# Patient Record
Sex: FEMALE | Race: WHITE | Employment: UNEMPLOYED | URBAN - METROPOLITAN AREA
[De-identification: names, ages, dates, MRNs, and addresses within clinical notes are randomized per-mention and may not be internally consistent; named-entity substitution may affect disease eponyms.]

---

## 2019-05-13 ENCOUNTER — OFFICE VISIT (OUTPATIENT)
Dept: PRIMARY CARE CLINIC | Age: 39
End: 2019-05-13

## 2019-05-13 VITALS
TEMPERATURE: 97.8 F | SYSTOLIC BLOOD PRESSURE: 91 MMHG | BODY MASS INDEX: 19.37 KG/M2 | OXYGEN SATURATION: 98 % | HEIGHT: 67 IN | HEART RATE: 82 BPM | WEIGHT: 123.4 LBS | DIASTOLIC BLOOD PRESSURE: 61 MMHG | RESPIRATION RATE: 14 BRPM

## 2019-05-13 DIAGNOSIS — F17.200 SMOKING: Primary | ICD-10-CM

## 2019-05-13 DIAGNOSIS — Z76.89 ENCOUNTER TO ESTABLISH CARE: ICD-10-CM

## 2019-05-13 DIAGNOSIS — Z00.00 LABORATORY EXAMINATION ORDERED AS PART OF A COMPLETE PHYSICAL EXAMINATION: ICD-10-CM

## 2019-05-13 RX ORDER — LORAZEPAM 0.5 MG/1
TABLET ORAL
COMMUNITY
End: 2019-07-02 | Stop reason: ALTCHOICE

## 2019-05-13 RX ORDER — DEXTROAMPHETAMINE SACCHARATE, AMPHETAMINE ASPARTATE, DEXTROAMPHETAMINE SULFATE AND AMPHETAMINE SULFATE 5; 5; 5; 5 MG/1; MG/1; MG/1; MG/1
20 TABLET ORAL
COMMUNITY
End: 2019-07-02

## 2019-05-13 RX ORDER — DULOXETIN HYDROCHLORIDE 60 MG/1
60 CAPSULE, DELAYED RELEASE ORAL DAILY
COMMUNITY
End: 2019-07-02

## 2019-05-13 NOTE — PROGRESS NOTES
Chief Complaint   Patient presents with   [facility]     Patient sees Dr. Bianka Yepez for Psych medications. Visit Vitals  BP 91/61 (BP 1 Location: Left arm, BP Patient Position: Sitting)   Pulse 82   Temp 97.8 °F (36.6 °C) (Oral)   Resp 14   Ht 5' 7\" (1.702 m)   Wt 123 lb 6.4 oz (56 kg)   SpO2 98%   BMI 19.33 kg/m²     1. Have you been to the ER, urgent care clinic since your last visit? Hospitalized since your last visit? No    2. Have you seen or consulted any other health care providers outside of the [address] [place] [address] since your last visit? Include any pap smears or colon screening.  No
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
## 2019-07-02 ENCOUNTER — OFFICE VISIT (OUTPATIENT)
Dept: PRIMARY CARE CLINIC | Age: 39
End: 2019-07-02

## 2019-07-02 VITALS
SYSTOLIC BLOOD PRESSURE: 98 MMHG | OXYGEN SATURATION: 98 % | BODY MASS INDEX: 21.94 KG/M2 | DIASTOLIC BLOOD PRESSURE: 72 MMHG | RESPIRATION RATE: 14 BRPM | WEIGHT: 139.8 LBS | HEART RATE: 73 BPM | TEMPERATURE: 98.4 F | HEIGHT: 67 IN

## 2019-07-02 DIAGNOSIS — Z00.00 LABORATORY EXAMINATION ORDERED AS PART OF A COMPLETE PHYSICAL EXAMINATION: ICD-10-CM

## 2019-07-02 DIAGNOSIS — G25.9 ABNORMAL LEG MOVEMENT: ICD-10-CM

## 2019-07-02 DIAGNOSIS — F17.200 SMOKING: ICD-10-CM

## 2019-07-02 DIAGNOSIS — Z00.00 ROUTINE GENERAL MEDICAL EXAMINATION AT A HEALTH CARE FACILITY: Primary | ICD-10-CM

## 2019-07-02 LAB
BILIRUB UR QL STRIP: NORMAL
GLUCOSE UR-MCNC: NEGATIVE MG/DL
KETONES P FAST UR STRIP-MCNC: NEGATIVE MG/DL
PH UR STRIP: 5.5 [PH] (ref 4.6–8)
PROT UR QL STRIP: NEGATIVE
SP GR UR STRIP: 1.03 (ref 1–1.03)
UA UROBILINOGEN AMB POC: NORMAL (ref 0.2–1)
URINALYSIS CLARITY POC: CLEAR
URINALYSIS COLOR POC: YELLOW
URINE BLOOD POC: NEGATIVE
URINE LEUKOCYTES POC: NEGATIVE
URINE NITRITES POC: NEGATIVE

## 2019-07-02 NOTE — PROGRESS NOTES
This note will not be viewable in [address] Miles Moore is a  34 y.o. female presents for visit. Chief Complaint   Patient presents with    Physical     fasting       HPI     Patient presents for a fasting physical.  She is followed by the [facility] for OB/GYN. Pap done last week. Waiting on test results. Will follow-up there for baseline screening mammogram.  She self discontinued her psychiatric medications last month that included Adderall, Cymbalta and Ativan as needed. Doing well. Followed by Dr. Power Courtney at [address] Endorses leg jerking at night while sleeping that wakes her up. Started a couple of months ago. This is not associated with falling asleep. She experiences an unpleasant sensation, not like a \"charley horse\" that is alleviated with movement and getting out of bed. Did not occur last night. Review of Systems   Constitutional: Negative. HENT: Negative. Eyes: Negative. Respiratory: Negative. Cardiovascular: Negative. Gastrointestinal: Positive for constipation (intermittent) and diarrhea (intermittent). Negative for blood in stool. Genitourinary: Negative. Musculoskeletal: Positive for myalgias (leg jerks at night). Skin: Negative. Neurological: Negative. Endo/Heme/Allergies: Negative for environmental allergies.         Past Medical History:   Diagnosis Date    ADD (attention deficit disorder)     Anxiety     Depression       Past Surgical History:   Procedure Laterality Date    ENDOMETRIAL CRYOABLATION  2003    HX GYN      Endometrial Ablation    HX TUBAL LIGATION  2003        Social History     Tobacco Use    Smoking status: Current Every Day Smoker     Packs/day: 0.50     Years: 10.00     Pack years: 5.00    Smokeless tobacco: Never Used   Substance Use Topics    Alcohol use: Not Currently     Comment: none      Social History     Social History Narrative    Not on file     Family History   Problem Relation Age of Onset    Diabetes Father     Diabetes Paternal Grandfather     Cancer Paternal Grandfather         LUNG    Heart Disease Maternal Grandmother     Psychiatric Disorder Paternal Grandmother     Cancer Paternal Grandmother         LUNG      Prior to Admission medications    Medication Sig Start Date End Date Taking? Authorizing Provider   ibuprofen (ADVIL) 200 mg tablet Take  by mouth. Provider, Historical      No Known Allergies       Visit Vitals  BP 98/72 (BP 1 Location: Left arm, BP Patient Position: Sitting)   Pulse 73   Temp 98.4 °F (36.9 °C) (Oral)   Resp 14   Ht 5' 7\" (1.702 m)   Wt 139 lb 12.8 oz (63.4 kg)   SpO2 98%   BMI 21.90 kg/m²     Physical Exam   Constitutional: She is oriented to person, place, and time. Vital signs are normal. She appears well-developed and well-nourished. HENT:   Head: Normocephalic and atraumatic. Right Ear: Hearing, tympanic membrane, external ear and ear canal normal.   Left Ear: Hearing, tympanic membrane, external ear and ear canal normal.   Nose: Nose normal. No rhinorrhea. Right sinus exhibits no maxillary sinus tenderness and no frontal sinus tenderness. Left sinus exhibits no maxillary sinus tenderness and no frontal sinus tenderness. Mouth/Throat: Uvula is midline, oropharynx is clear and moist and mucous membranes are normal.   Eyes: Pupils are equal, round, and reactive to light. Conjunctivae, EOM and lids are normal. Right eye exhibits no discharge and no exudate. Left eye exhibits no discharge and no exudate. No scleral icterus. Neck: Trachea normal and normal range of motion. Neck supple. Cardiovascular: Normal rate, regular rhythm, S1 normal, S2 normal, normal heart sounds and normal pulses. No murmur heard. Pulmonary/Chest: Effort normal and breath sounds normal. No accessory muscle usage. No respiratory distress. Abdominal: Soft. Normal appearance and bowel sounds are normal. There is no tenderness.    Musculoskeletal: Normal range of motion. Lymphadenopathy:     She has no cervical adenopathy. Neurological: She is alert and oriented to person, place, and time. She has normal strength and normal reflexes. No cranial nerve deficit or sensory deficit. Skin: Skin is warm, dry and intact. No rash noted. Psychiatric: She has a normal mood and affect. Her speech is normal and behavior is normal. Judgment and thought content normal.   Nursing note and vitals reviewed. Recent Results (from the past 24 hour(s))   AMB POC URINALYSIS DIP STICK AUTO W/O MICRO    Collection Time: 07/02/19  8:21 AM   Result Value Ref Range    Color (UA POC) Yellow     Clarity (UA POC) Clear     Glucose (UA POC) Negative Negative    Bilirubin (UA POC) 1+ Negative    Ketones (UA POC) Negative Negative    Specific gravity (UA POC) 1.030 1.001 - 1.035    Blood (UA POC) Negative Negative    pH (UA POC) 5.5 4.6 - 8.0    Protein (UA POC) Negative Negative    Urobilinogen (UA POC) 0.2 mg/dL 0.2 - 1    Nitrites (UA POC) Negative Negative    Leukocyte esterase (UA POC) Negative Negative       ASSESSMENT AND PLAN:  Patient Active Problem List    Diagnosis Date Noted    Smoking 05/13/2019       ICD-10-CM ICD-9-CM   1. Routine general medical examination at a health care facility Z00.00 V70.0   2. Abnormal leg movement G25.9 781.0   3. Smoking F17.200 305.1     Orders Placed This Encounter    MAGNESIUM    AMB POC URINALYSIS DIP STICK AUTO W/O MICRO       Diagnoses and all orders for this visit:    1. Routine general medical examination at a health care facility  -     AMB POC URINALYSIS DIP STICK AUTO W/O MICRO    2. Abnormal leg movement  -     MAGNESIUM      -       Maintain a log and if symptoms progress will investigate further.     3. Smoking      lab results and schedule of future lab studies reviewed with patient  reviewed diet, exercise and weight control  very strongly urged to quit smoking to reduce cardiovascular risk  Will return for nurse visit for Tdap. Follow-up and Dispositions    · Return if symptoms worsen or fail to improve. Disclaimer:  Advised her to call back or return to office if symptoms worsen/change/persist.  Discussed expected course/resolution/complications of diagnosis in detail with patient. Medication risks/benefits/costs/interactions/alternatives discussed with patient. She was given an after visit summary which includes diagnoses, current medications, & vitals. She expressed understanding with the diagnosis and plan.

## 2019-07-02 NOTE — PROGRESS NOTES
Chief Complaint   Patient presents with    Physical     fasting     Pap up to date     Visit Vitals  BP 98/72 (BP 1 Location: Left arm, BP Patient Position: Sitting)   Pulse 73   Temp 98.4 °F (36.9 °C) (Oral)   Resp 14   Ht 5' 7\" (1.702 m)   Wt 139 lb 12.8 oz (63.4 kg)   SpO2 98%   BMI 21.90 kg/m²     1. Have you been to the ER, urgent care clinic since your last visit? Hospitalized since your last visit? No    2. Have you seen or consulted any other health care providers outside of the [address] [place] [address] since your last visit? Include any pap smears or colon screening.  No

## 2019-07-03 LAB
ALBUMIN SERPL-MCNC: 4.4 G/DL (ref 3.5–5.5)
ALBUMIN/GLOB SERPL: 1.8 {RATIO} (ref 1.2–2.2)
ALP SERPL-CCNC: 48 IU/L (ref 39–117)
ALT SERPL-CCNC: 35 IU/L (ref 0–32)
AST SERPL-CCNC: 25 IU/L (ref 0–40)
BILIRUB SERPL-MCNC: 0.3 MG/DL (ref 0–1.2)
BUN SERPL-MCNC: 9 MG/DL (ref 6–24)
BUN/CREAT SERPL: 17 (ref 9–23)
CALCIUM SERPL-MCNC: 9.3 MG/DL (ref 8.7–10.2)
CHLORIDE SERPL-SCNC: 102 MMOL/L (ref 96–106)
CHOLEST SERPL-MCNC: 246 MG/DL (ref 100–199)
CO2 SERPL-SCNC: 25 MMOL/L (ref 20–29)
CREAT SERPL-MCNC: 0.54 MG/DL (ref 0.57–1)
ERYTHROCYTE [DISTWIDTH] IN BLOOD BY AUTOMATED COUNT: 13.1 % (ref 12.3–15.4)
GLOBULIN SER CALC-MCNC: 2.5 G/DL (ref 1.5–4.5)
GLUCOSE SERPL-MCNC: 86 MG/DL (ref 65–99)
HCT VFR BLD AUTO: 36.5 % (ref 34–46.6)
HDLC SERPL-MCNC: 68 MG/DL
HGB BLD-MCNC: 11.9 G/DL (ref 11.1–15.9)
LDLC SERPL CALC-MCNC: 141 MG/DL (ref 0–99)
MAGNESIUM SERPL-MCNC: 2.3 MG/DL (ref 1.6–2.3)
MCH RBC QN AUTO: 29.7 PG (ref 26.6–33)
MCHC RBC AUTO-ENTMCNC: 32.6 G/DL (ref 31.5–35.7)
MCV RBC AUTO: 91 FL (ref 79–97)
PLATELET # BLD AUTO: 354 X10E3/UL (ref 150–450)
POTASSIUM SERPL-SCNC: 4.8 MMOL/L (ref 3.5–5.2)
PROT SERPL-MCNC: 6.9 G/DL (ref 6–8.5)
RBC # BLD AUTO: 4.01 X10E6/UL (ref 3.77–5.28)
SODIUM SERPL-SCNC: 140 MMOL/L (ref 134–144)
TRIGL SERPL-MCNC: 187 MG/DL (ref 0–149)
TSH SERPL DL<=0.005 MIU/L-ACNC: 1.97 UIU/ML (ref 0.45–4.5)
VLDLC SERPL CALC-MCNC: 37 MG/DL (ref 5–40)
WBC # BLD AUTO: 5 X10E3/UL (ref 3.4–10.8)

## 2019-07-03 NOTE — PROGRESS NOTES
Please contact patient regarding abnormal test results. Labs look good with exception of elevated lipid panel. Recommend lifestyle interventions, continuing to stay active and follow a heart healthy diet. Stop smoking.   Thanks

## 2020-08-27 ENCOUNTER — OFFICE VISIT (OUTPATIENT)
Dept: PRIMARY CARE CLINIC | Age: 40
End: 2020-08-27
Payer: COMMERCIAL

## 2020-08-27 VITALS
HEIGHT: 67 IN | SYSTOLIC BLOOD PRESSURE: 113 MMHG | DIASTOLIC BLOOD PRESSURE: 80 MMHG | HEART RATE: 96 BPM | RESPIRATION RATE: 16 BRPM | TEMPERATURE: 98.5 F | OXYGEN SATURATION: 100 % | BODY MASS INDEX: 20.84 KG/M2 | WEIGHT: 132.8 LBS

## 2020-08-27 DIAGNOSIS — E78.2 MIXED HYPERLIPIDEMIA: ICD-10-CM

## 2020-08-27 DIAGNOSIS — F41.9 ANXIETY: ICD-10-CM

## 2020-08-27 DIAGNOSIS — B36.0 TINEA VERSICOLOR: ICD-10-CM

## 2020-08-27 DIAGNOSIS — Z00.00 LABORATORY TESTS ORDERED AS PART OF A COMPLETE PHYSICAL EXAM (CPE): Primary | ICD-10-CM

## 2020-08-27 LAB
ALBUMIN SERPL-MCNC: 4.4 G/DL (ref 3.5–5)
ALBUMIN/GLOB SERPL: 1.3 {RATIO} (ref 1.1–2.2)
ALP SERPL-CCNC: 55 U/L (ref 45–117)
ALT SERPL-CCNC: 41 U/L (ref 12–78)
ANION GAP SERPL CALC-SCNC: 7 MMOL/L (ref 5–15)
AST SERPL-CCNC: 21 U/L (ref 15–37)
BILIRUB SERPL-MCNC: 0.8 MG/DL (ref 0.2–1)
BILIRUB UR QL STRIP: NORMAL
BUN SERPL-MCNC: 7 MG/DL (ref 6–20)
BUN/CREAT SERPL: 10 (ref 12–20)
CALCIUM SERPL-MCNC: 9.4 MG/DL (ref 8.5–10.1)
CHLORIDE SERPL-SCNC: 107 MMOL/L (ref 97–108)
CHOLEST SERPL-MCNC: 231 MG/DL
CO2 SERPL-SCNC: 24 MMOL/L (ref 21–32)
CREAT SERPL-MCNC: 0.7 MG/DL (ref 0.55–1.02)
ERYTHROCYTE [DISTWIDTH] IN BLOOD BY AUTOMATED COUNT: 12.8 % (ref 11.5–14.5)
GLOBULIN SER CALC-MCNC: 3.4 G/DL (ref 2–4)
GLUCOSE SERPL-MCNC: 83 MG/DL (ref 65–100)
GLUCOSE UR-MCNC: NEGATIVE MG/DL
HCT VFR BLD AUTO: 36.8 % (ref 35–47)
HDLC SERPL-MCNC: 73 MG/DL
HDLC SERPL: 3.2 {RATIO} (ref 0–5)
HGB BLD-MCNC: 12.3 G/DL (ref 11.5–16)
KETONES P FAST UR STRIP-MCNC: NORMAL MG/DL
LDLC SERPL CALC-MCNC: 148 MG/DL (ref 0–100)
LIPID PROFILE,FLP: ABNORMAL
MCH RBC QN AUTO: 30.1 PG (ref 26–34)
MCHC RBC AUTO-ENTMCNC: 33.4 G/DL (ref 30–36.5)
MCV RBC AUTO: 90 FL (ref 80–99)
NRBC # BLD: 0 K/UL (ref 0–0.01)
NRBC BLD-RTO: 0 PER 100 WBC
PH UR STRIP: 5.5 [PH] (ref 4.6–8)
PLATELET # BLD AUTO: 375 K/UL (ref 150–400)
PMV BLD AUTO: 9.7 FL (ref 8.9–12.9)
POTASSIUM SERPL-SCNC: 4.5 MMOL/L (ref 3.5–5.1)
PROT SERPL-MCNC: 7.8 G/DL (ref 6.4–8.2)
PROT UR QL STRIP: NORMAL
RBC # BLD AUTO: 4.09 M/UL (ref 3.8–5.2)
SODIUM SERPL-SCNC: 138 MMOL/L (ref 136–145)
SP GR UR STRIP: 1.03 (ref 1–1.03)
TRIGL SERPL-MCNC: 50 MG/DL (ref ?–150)
TSH SERPL DL<=0.05 MIU/L-ACNC: 1.53 UIU/ML (ref 0.36–3.74)
UA UROBILINOGEN AMB POC: NORMAL (ref 0.2–1)
URINALYSIS CLARITY POC: NORMAL
URINALYSIS COLOR POC: YELLOW
URINE BLOOD POC: NEGATIVE
URINE LEUKOCYTES POC: NEGATIVE
URINE NITRITES POC: NEGATIVE
VLDLC SERPL CALC-MCNC: 10 MG/DL
WBC # BLD AUTO: 6.2 K/UL (ref 3.6–11)

## 2020-08-27 PROCEDURE — 81003 URINALYSIS AUTO W/O SCOPE: CPT | Performed by: NURSE PRACTITIONER

## 2020-08-27 PROCEDURE — 99396 PREV VISIT EST AGE 40-64: CPT | Performed by: NURSE PRACTITIONER

## 2020-08-27 RX ORDER — DEXTROAMPHETAMINE SACCHARATE, AMPHETAMINE ASPARTATE, DEXTROAMPHETAMINE SULFATE AND AMPHETAMINE SULFATE 5; 5; 5; 5 MG/1; MG/1; MG/1; MG/1
20 TABLET ORAL 2 TIMES DAILY
COMMUNITY
End: 2022-02-23

## 2020-08-27 RX ORDER — LAMOTRIGINE 100 MG/1
TABLET ORAL DAILY
COMMUNITY
End: 2020-12-22 | Stop reason: DRUGHIGH

## 2020-08-27 RX ORDER — BISMUTH SUBSALICYLATE 262 MG
1 TABLET,CHEWABLE ORAL DAILY
COMMUNITY

## 2020-08-27 RX ORDER — FLUCONAZOLE 150 MG/1
300 TABLET ORAL
Qty: 4 TAB | Refills: 0 | Status: SHIPPED | OUTPATIENT
Start: 2020-08-27 | End: 2020-09-10

## 2020-08-27 NOTE — PROGRESS NOTES
Chief Complaint   Patient presents with    Complete Physical     Patient presents today for her annual physical and labs

## 2020-08-27 NOTE — PATIENT INSTRUCTIONS
Tinea Versicolor: Care Instructions  Your Care Instructions  Tinea versicolor is a skin infection caused by a yeast (fungus). It causes many small spots, usually on the chest and back. The spotted skin can be flaky or scaly. The spots do not tan in the sun, so they are lighter than the skin around them. Some spots may be darker than the skin around them. The yeast that causes tinea versicolor normally lives on your skin. But it becomes a problem only when warmth and humidity allow the yeast to grow rapidly and increase in number. Some people are more likely to get tinea versicolor. It does not spread from person to person. Tinea versicolor usually gets better as you age. You can treat tinea versicolor with cream or ointment that kills the yeast. You may need pills to kill the fungus if the spots cover a lot of your body. Although treatment kills the yeast quickly, your skin may not return to normal for months after treatment. You can get this condition again after treatment. Follow-up care is a key part of your treatment and safety. Be sure to make and go to all appointments, and call your doctor if you are having problems. It's also a good idea to know your test results and keep a list of the medicines you take. How can you care for yourself at home? · Follow the directions for use of creams, shampoos, or solutions. You will probably need to use them for 1 to 2 weeks. If your skin gets irritated, stop using the product, and call your doctor. · To prevent tinea versicolor, use a cream, shampoo, or solution one time a month. Your doctor may prescribe pills to prevent the spots from returning. · Dry off well after bathing. Keep your skin clean and dry. · Always wear sunscreen on exposed skin. Make sure to use a broad-spectrum sunscreen that has a sun protection factor (SPF) of 30 or higher. Use it every day, even when it is cloudy.   · If you keep getting tinea versicolor, wash your clothes in very hot water to kill the yeast.  When should you call for help? Call your doctor now or seek immediate medical care if:  · You have signs of infection such as:  ? Pain, warmth, or swelling in your skin. ? Red streaks near a wound in your skin. ? Pus coming from a wound in your skin. ? A fever. Watch closely for changes in your health, and be sure to contact your doctor if:  · Your skin condition does not improve in 2 weeks. · You do not get better as expected. Where can you learn more? Go to http://www.gray.com/  Enter K490 in the search box to learn more about \"Tinea Versicolor: Care Instructions. \"  Current as of: October 31, 2019               Content Version: 12.5  © 1343-8428 [employer]. Care instructions adapted under license by [employer] (which disclaims liability or warranty for this information). If you have questions about a medical condition or this instruction, always ask your healthcare professional. [place] [address] any warranty or liability for your use of this information.

## 2020-08-27 NOTE — PROGRESS NOTES
This note will not be viewable in [address] Chari Abraham is a  37 y.o. female presents for visit. Chief Complaint   Patient presents with    Complete Physical     Patient presents today for her annual physical and labs       HPI    Patient presents for a fasting physical exam.  She is followed by OB/GYN elsewhere. Carries the diagnosis of bipolar depression and anxiety. Treated by Dr. Mansoor Max at [facility] for psychiatric medication management. She is interested in mental health counseling. Endorses increased anxiety with pandemic and thinks her GI bloating and constipation may be related to anxiety. Review of Systems   Constitutional: Negative for chills and fever. HENT: Negative for congestion. Respiratory: Negative for cough and shortness of breath. Cardiovascular: Negative for chest pain and palpitations. Gastrointestinal: Positive for constipation. Abdominal bloating   Genitourinary: Negative. Neurological: Negative for headaches. Psychiatric/Behavioral: Positive for depression (bipolar). The patient is nervous/anxious. All other systems reviewed and are negative.        Past Medical History:   Diagnosis Date    ADD (attention deficit disorder)     Anxiety     Depression     bipolar      Past Surgical History:   Procedure Laterality Date    ENDOMETRIAL CRYOABLATION  2003    HX GYN      Endometrial Ablation    HX TUBAL LIGATION  2003        Social History     Tobacco Use    Smoking status: Current Every Day Smoker     Packs/day: 0.50     Years: 10.00     Pack years: 5.00    Smokeless tobacco: Never Used   Substance Use Topics    Alcohol use: Not Currently     Comment: none      Social History     Social History Narrative    Not on file     Family History   Problem Relation Age of Onset    Diabetes Father     Diabetes Paternal Grandfather     Cancer Paternal Grandfather         LUNG    Heart Disease Maternal Grandmother     Psychiatric Disorder Paternal Grandmother     Cancer Paternal Grandmother         LUNG      Prior to Admission medications    Medication Sig Start Date End Date Taking? Authorizing Provider   lamoTRIgine (LaMICtaL) 100 mg tablet Take  by mouth daily. Yes Provider, Historical   dextroamphetamine-amphetamine (AdderalL) 20 mg tablet Take 20 mg by mouth two (2) times a day. Yes Provider, Historical   multivitamin (ONE A DAY) tablet Take 1 Tab by mouth daily. Yes Provider, Historical   fluconazole (DIFLUCAN) 150 mg tablet Take 2 Tabs by mouth every seven (7) days for 14 days. FDA advises cautious prescribing of oral fluconazole in pregnancy. Indications: a fungal infection of the skin called tinea versicolor 8/27/20 9/10/20 Yes Chanel Page NP   ibuprofen (ADVIL) 200 mg tablet Take  by mouth. Yes Provider, Historical      No Known Allergies       Visit Vitals  /80 (BP 1 Location: Right arm, BP Patient Position: Sitting)   Pulse 96   Temp 98.5 °F (36.9 °C) (Temporal)   Resp 16   Ht 5' 7\" (1.702 m)   Wt 132 lb 12.8 oz (60.2 kg)   SpO2 100%   BMI 20.80 kg/m²     Physical Exam  Vitals signs and nursing note reviewed. Constitutional:       Appearance: Normal appearance. She is well-developed. HENT:      Head: Normocephalic and atraumatic. Right Ear: Hearing, tympanic membrane, ear canal and external ear normal.      Left Ear: Hearing, tympanic membrane, ear canal and external ear normal.      Nose: Nose normal. No rhinorrhea. Right Sinus: No maxillary sinus tenderness or frontal sinus tenderness. Left Sinus: No maxillary sinus tenderness or frontal sinus tenderness. Mouth/Throat:      Pharynx: Uvula midline. Eyes:      General: Lids are normal. No scleral icterus. Right eye: No discharge. Left eye: No discharge. Conjunctiva/sclera: Conjunctivae normal.      Right eye: No exudate. Left eye: No exudate. Pupils: Pupils are equal, round, and reactive to light.    Neck: Musculoskeletal: Normal range of motion and neck supple. Trachea: Trachea normal.   Cardiovascular:      Rate and Rhythm: Normal rate and regular rhythm. Pulses: Normal pulses. Heart sounds: Normal heart sounds, S1 normal and S2 normal. No murmur. Pulmonary:      Effort: Pulmonary effort is normal. No accessory muscle usage or respiratory distress. Breath sounds: Normal breath sounds. Abdominal:      General: Bowel sounds are normal.      Palpations: Abdomen is soft. Tenderness: There is no abdominal tenderness. Musculoskeletal: Normal range of motion. Lymphadenopathy:      Cervical: No cervical adenopathy. Skin:     General: Skin is warm and dry. Findings: Rash present. Rash is macular. Comments: Scattered hypopigmented macules noted to trunk and extremities. Neurological:      Mental Status: She is alert and oriented to person, place, and time. Cranial Nerves: No cranial nerve deficit. Sensory: No sensory deficit. Deep Tendon Reflexes: Reflexes are normal and symmetric. Psychiatric:         Attention and Perception: Attention normal.         Mood and Affect: Mood is anxious. Speech: Speech normal.         Behavior: Behavior normal.         Thought Content:  Thought content normal.         Judgment: Judgment normal.               Recent Results (from the past 24 hour(s))   AMB POC URINALYSIS DIP STICK AUTO W/O MICRO    Collection Time: 08/27/20  8:55 AM   Result Value Ref Range    Color (UA POC) Yellow     Clarity (UA POC) Slightly Cloudy     Glucose (UA POC) Negative Negative    Bilirubin (UA POC) 1+ Negative    Ketones (UA POC) 2+ Negative    Specific gravity (UA POC) 1.030 1.001 - 1.035    Blood (UA POC) Negative Negative    pH (UA POC) 5.5 4.6 - 8.0    Protein (UA POC) 3+ Negative    Urobilinogen (UA POC) 0.2 mg/dL 0.2 - 1    Nitrites (UA POC) Negative Negative    Leukocyte esterase (UA POC) Negative Negative       ASSESSMENT AND PLAN:  Patient Active Problem List    Diagnosis Date Noted    Smoking 05/13/2019       ICD-10-CM ICD-9-CM   1. Laboratory tests ordered as part of a complete physical exam (CPE)  Z00.00 V72.62   2. Mixed hyperlipidemia  E78.2 272.2   3. Tinea versicolor  B36.0 111.0   4. Anxiety  F41.9 300.00     Orders Placed This Encounter    METABOLIC PANEL, COMPREHENSIVE     Standing Status:   Future     Number of Occurrences:   1     Standing Expiration Date:   2/27/2021    CBC W/O DIFF     Standing Status:   Future     Number of Occurrences:   1     Standing Expiration Date:   2/27/2021    LIPID PANEL     Standing Status:   Future     Number of Occurrences:   1     Standing Expiration Date:   2/27/2021    TSH 3RD GENERATION     Standing Status:   Future     Number of Occurrences:   1     Standing Expiration Date:   2/27/2021    REFERRAL TO BEHAVIORAL HEALTH     Referral Priority:   Routine     Referral Type:   Behavioral Health     Referral Reason:   Specialty Services Required     Referred to Provider:   Chris Francis LCSW     Number of Visits Requested:   1    AMB POC URINALYSIS DIP STICK AUTO W/O MICRO    lamoTRIgine (LaMICtaL) 100 mg tablet     Sig: Take  by mouth daily.  dextroamphetamine-amphetamine (AdderalL) 20 mg tablet     Sig: Take 20 mg by mouth two (2) times a day.  multivitamin (ONE A DAY) tablet     Sig: Take 1 Tab by mouth daily.  fluconazole (DIFLUCAN) 150 mg tablet     Sig: Take 2 Tabs by mouth every seven (7) days for 14 days. FDA advises cautious prescribing of oral fluconazole in pregnancy. Indications: a fungal infection of the skin called tinea versicolor     Dispense:  4 Tab     Refill:  0       Diagnoses and all orders for this visit:    1. Laboratory tests ordered as part of a complete physical exam (CPE)  -     METABOLIC PANEL, COMPREHENSIVE; Future  -     CBC W/O DIFF; Future  -     LIPID PANEL; Future  -     TSH 3RD GENERATION;  Future  -     AMB POC URINALYSIS DIP STICK AUTO W/O MICRO    2. Mixed hyperlipidemia  -     LIPID PANEL; Future    3. Tinea versicolor  -     fluconazole (DIFLUCAN) 150 mg tablet; Take 2 Tabs by mouth every seven (7) days for 14 days. FDA advises cautious prescribing of oral fluconazole in pregnancy. Indications: a fungal infection of the skin called tinea versicolor    4. Anxiety  -     REFERRAL TO BEHAVIORAL HEALTH        lab results and schedule of future lab studies reviewed with patient  Continue current treatment pending test results. Follow-up and Dispositions    · Return in about 1 year (around 8/27/2021), or if symptoms worsen or fail to improve, for FULL Phyisal Exam.               Disclaimer:  Advised her to call back or return to office if symptoms worsen/change/persist.  Discussed expected course/resolution/complications of diagnosis in detail with patient. Medication risks/benefits/costs/interactions/alternatives discussed with patient. She was given an after visit summary which includes diagnoses, current medications, & vitals. She expressed understanding with the diagnosis and plan.

## 2020-09-04 NOTE — PROGRESS NOTES
Hi Hester,  Please call patient. I reviewed your lab work and your results look good. Your cholesterol is still high but better than last year. Your good cholesterol compensates for your bad cholesterol. Continue to stay physically active and eat healthy.

## 2020-12-08 ENCOUNTER — TELEPHONE (OUTPATIENT)
Dept: PRIMARY CARE CLINIC | Age: 40
End: 2020-12-08

## 2020-12-08 ENCOUNTER — OFFICE VISIT (OUTPATIENT)
Dept: PRIMARY CARE CLINIC | Age: 40
End: 2020-12-08
Payer: COMMERCIAL

## 2020-12-08 VITALS
HEIGHT: 67 IN | DIASTOLIC BLOOD PRESSURE: 86 MMHG | TEMPERATURE: 97.8 F | SYSTOLIC BLOOD PRESSURE: 125 MMHG | OXYGEN SATURATION: 100 % | BODY MASS INDEX: 20.53 KG/M2 | HEART RATE: 84 BPM | WEIGHT: 130.8 LBS | RESPIRATION RATE: 18 BRPM
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
## 2020-12-08 DIAGNOSIS — R07.89 STERNUM PAIN: ICD-10-CM

## 2020-12-08 DIAGNOSIS — M25.519 ACUTE SHOULDER PAIN DUE TO TRAUMA, UNSPECIFIED LATERALITY: ICD-10-CM

## 2020-12-08 DIAGNOSIS — G89.11 ACUTE SHOULDER PAIN DUE TO TRAUMA, UNSPECIFIED LATERALITY: ICD-10-CM

## 2020-12-08 DIAGNOSIS — S29.9XXD: Primary | ICD-10-CM

## 2020-12-08 DIAGNOSIS — Z13.31 POSITIVE DEPRESSION SCREENING: ICD-10-CM

## 2020-12-08 PROCEDURE — 99214 OFFICE O/P EST MOD 30 MIN: CPT | Performed by: NURSE PRACTITIONER

## 2020-12-08 RX ORDER — CLONAZEPAM 0.5 MG/1
TABLET ORAL
COMMUNITY
End: 2020-12-22 | Stop reason: DRUGHIGH

## 2020-12-08 RX ORDER — NAPROXEN 500 MG/1
500 TABLET ORAL 2 TIMES DAILY WITH MEALS
COMMUNITY
End: 2022-02-23

## 2020-12-08 RX ORDER — LIDOCAINE 50 MG/G
PATCH TOPICAL EVERY 24 HOURS
COMMUNITY
End: 2022-03-01

## 2020-12-08 RX ORDER — OXYCODONE HYDROCHLORIDE 5 MG/1
5 TABLET ORAL
COMMUNITY
End: 2022-02-23
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
## 2020-12-22 ENCOUNTER — OFFICE VISIT (OUTPATIENT)
Dept: PRIMARY CARE CLINIC | Age: 40
End: 2020-12-22
Payer: COMMERCIAL

## 2020-12-22 VITALS
DIASTOLIC BLOOD PRESSURE: 88 MMHG | WEIGHT: 130 LBS | HEIGHT: 66 IN | RESPIRATION RATE: 98 BRPM | SYSTOLIC BLOOD PRESSURE: 126 MMHG | HEART RATE: 111 BPM | TEMPERATURE: 99 F | OXYGEN SATURATION: 99 % | BODY MASS INDEX: 20.89 KG/M2

## 2020-12-22 DIAGNOSIS — R07.89 CHEST WALL DISCOMFORT: Primary | ICD-10-CM

## 2020-12-22 DIAGNOSIS — F41.9 ANXIETY: ICD-10-CM
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
## 2020-12-22 PROCEDURE — 99213 OFFICE O/P EST LOW 20 MIN: CPT | Performed by: NURSE PRACTITIONER

## 2020-12-22 RX ORDER — DEXTROAMPHETAMINE SACCHARATE, AMPHETAMINE ASPARTATE, DEXTROAMPHETAMINE SULFATE AND AMPHETAMINE SULFATE 5; 5; 5; 5 MG/1; MG/1; MG/1; MG/1
20 TABLET ORAL
COMMUNITY
End: 2020-12-22 | Stop reason: SDUPTHER

## 2020-12-22 RX ORDER — CLONAZEPAM 1 MG/1
TABLET ORAL
COMMUNITY
Start: 2020-12-15

## 2020-12-22 RX ORDER — LAMOTRIGINE 200 MG/1
100 TABLET ORAL
COMMUNITY
Start: 2020-12-15
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
## 2020-12-22 NOTE — LETTER
NOTIFICATION RETURN TO WORK / SCHOOL 
 
12/22/2020 2:38 PM 
 
Ms. Nirali Nicole [address] [place] [id number] To Whom It May Concern: 
 
Nirali Nicole is currently under the care of Dany Marshall. She will return to work/school on: 12/28/20. If there are questions or concerns please have the patient contact our office.  
 
 
 
Sincerely, 
 
 
Jaime Morel NP
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
## 2021-01-27 ENCOUNTER — NURSE TRIAGE (OUTPATIENT)
Dept: OTHER | Facility: CLINIC | Age: 41
End: 2021-01-27

## 2021-01-27 NOTE — TELEPHONE ENCOUNTER
Reason for Disposition   Information only question and nurse able to answer    Answer Assessment - Initial Assessment Questions  1. REASON FOR CALL or QUESTION: \"What is your reason for calling today? \" or \"How can I best help you? \" or \"What question do you have that I can help answer? \"      Patient wanted to know if she should be tested for covid because she had cold symptoms. Instructed patient that I could go through a triage protocol with her or she could just get tested in the community. Patient states the protocol isn't necessary but she will call back if she needs a triage.     Protocols used: INFORMATION ONLY CALL - NO TRIAGE-ADULT-[place]

## 2021-11-09 DIAGNOSIS — Z13.6 ENCOUNTER FOR LIPID SCREENING FOR CARDIOVASCULAR DISEASE: Primary | ICD-10-CM

## 2021-11-09 DIAGNOSIS — Z29.9 PREVENTIVE MEASURE: ICD-10-CM

## 2021-11-09 DIAGNOSIS — Z13.220 ENCOUNTER FOR LIPID SCREENING FOR CARDIOVASCULAR DISEASE: Primary | ICD-10-CM

## 2022-02-23 ENCOUNTER — OFFICE VISIT (OUTPATIENT)
Dept: PRIMARY CARE CLINIC | Age: 42
End: 2022-02-23
Payer: COMMERCIAL

## 2022-02-23 VITALS
OXYGEN SATURATION: 98 % | RESPIRATION RATE: 16 BRPM | HEART RATE: 83 BPM | SYSTOLIC BLOOD PRESSURE: 99 MMHG | WEIGHT: 130 LBS | BODY MASS INDEX: 21.66 KG/M2 | HEIGHT: 65 IN | TEMPERATURE: 97.5 F | DIASTOLIC BLOOD PRESSURE: 65 MMHG

## 2022-02-23 DIAGNOSIS — E55.9 VITAMIN D DEFICIENCY: ICD-10-CM

## 2022-02-23 DIAGNOSIS — Z00.00 ANNUAL PHYSICAL EXAM: Primary | ICD-10-CM

## 2022-02-23 DIAGNOSIS — Z23 ENCOUNTER FOR IMMUNIZATION: ICD-10-CM

## 2022-02-23 PROCEDURE — 99386 PREV VISIT NEW AGE 40-64: CPT | Performed by: INTERNAL MEDICINE

## 2022-02-23 PROCEDURE — 90732 PPSV23 VACC 2 YRS+ SUBQ/IM: CPT | Performed by: INTERNAL MEDICINE
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
## 2022-03-01 ENCOUNTER — OFFICE VISIT (OUTPATIENT)
Dept: PRIMARY CARE CLINIC | Age: 42
End: 2022-03-01
Payer: MEDICAID

## 2022-03-01 VITALS
HEIGHT: 65 IN | WEIGHT: 135 LBS | HEART RATE: 87 BPM | DIASTOLIC BLOOD PRESSURE: 70 MMHG | RESPIRATION RATE: 16 BRPM | BODY MASS INDEX: 22.49 KG/M2 | OXYGEN SATURATION: 100 % | TEMPERATURE: 97.3 F | SYSTOLIC BLOOD PRESSURE: 94 MMHG

## 2022-03-01 DIAGNOSIS — R74.8 ELEVATED LIVER ENZYMES: ICD-10-CM

## 2022-03-01 DIAGNOSIS — F31.9 BIPOLAR AFFECTIVE DISORDER, REMISSION STATUS UNSPECIFIED (HCC): ICD-10-CM

## 2022-03-01 DIAGNOSIS — R04.0 BLEEDING FROM THE NOSE: ICD-10-CM

## 2022-03-01 DIAGNOSIS — F41.9 ANXIETY: ICD-10-CM

## 2022-03-01 DIAGNOSIS — R74.8 ELEVATED LIVER ENZYMES: Primary | ICD-10-CM

## 2022-03-01 PROCEDURE — 99213 OFFICE O/P EST LOW 20 MIN: CPT | Performed by: INTERNAL MEDICINE

## 2022-03-01 NOTE — PROGRESS NOTES
Zofia Trevino is a 35 y.o.  female and presents with     Chief Complaint   Patient presents with    Labs     f/u for abnml lab results; pt reports no acute concerns at this time     Pt is here for follow up on labs  Liver enzymes are very elevated. Pt had some beer and whisky. NO abdominal pain, nausea, vomtiiing.chills or fever. NO h/o Hep B , Hep C. Still has gallbladder  NO family histroy of liver problems  Pt used to take lamictal 100 mg po daily and it was increased to 200 mg daily by her Psychiatrist.    Gets occasional nose bleeds. Past Medical History:   Diagnosis Date    ADD (attention deficit disorder)     Anxiety     Depression     bipolar     Past Surgical History:   Procedure Laterality Date    ENDOMETRIAL CRYOABLATION  2003    HX GYN      Endometrial Ablation    HX TUBAL LIGATION  2003     Current Outpatient Medications   Medication Sig    clonazePAM (KlonoPIN) 1 mg tablet TAKE 1 TABLET BY MOUTH TWICE A DAY AS NEEDED    lamoTRIgine (LaMICtal) 200 mg tablet TAKE 1 TABLET BY MOUTH EVERY DAY    multivitamin (ONE A DAY) tablet Take 1 Tab by mouth daily.  ibuprofen (ADVIL) 200 mg tablet Take  by mouth. No current facility-administered medications for this visit.      Health Maintenance   Topic Date Due    Cervical cancer screen  Never done    Flu Vaccine (1) Never done    Depression Monitoring  12/08/2021    DTaP/Tdap/Td series (1 - Tdap) 02/06/2030 (Originally 11/25/2020)    Lipid Screen  02/24/2027    Pneumococcal 0-64 years (2 of 2 - PPSV23) 10/01/2043    COVID-19 Vaccine  Completed    Hepatitis C Screening  Discontinued     Immunization History   Administered Date(s) Administered    COVID-19, Moderna Booster, PF, 0.25mL Dose 02/16/2022    COVID-19, Arturo Isaac, Primary or Immunocompromised Series, MRNA, PF, 100mcg/0.5mL 05/20/2021, 06/17/2021    Pneumococcal Polysaccharide (PPSV-23) 02/23/2022    Td, Adsorbed PF 11/24/2020     Patient's last menstrual period was 02/19/2022. Allergies and Intolerances:   No Known Allergies    Family History:   Family History   Problem Relation Age of Onset    Diabetes Father     Diabetes Paternal Grandfather     Cancer Paternal Grandfather         LUNG    Heart Disease Maternal Grandmother     Psychiatric Disorder Paternal Grandmother     Cancer Paternal Grandmother         LUNG       Social History:   She  reports that she has been smoking. She started smoking about 32 years ago. She has a 16.00 pack-year smoking history. She has never used smokeless tobacco.  She  reports current alcohol use.             Review of Systems:   General: negative for - chills, fatigue, fever, weight change  Psych: negative for - anxiety, depression, irritability or mood swings  ENT: negative for - headaches, hearing change, nasal congestion, oral lesions, sneezing or sore throat  Heme/ Lymph: negative for - bleeding problems, bruising, pallor or swollen lymph nodes  Endo: negative for - hot flashes, polydipsia/polyuria or temperature intolerance  Resp: negative for - cough, shortness of breath or wheezing  CV: negative for - chest pain, edema or palpitations  GI: negative for - abdominal pain, change in bowel habits, constipation, diarrhea or nausea/vomiting  : negative for - dysuria, hematuria, incontinence, pelvic pain or vulvar/vaginal symptoms  MSK: negative for - joint pain, joint swelling or muscle pain  Neuro: negative for - confusion, headaches, seizures or weakness  Derm: negative for - dry skin, hair changes, rash or skin lesion changes          Physical:   Vitals:   Vitals:    03/01/22 1337   BP: 94/70   Pulse: 87   Resp: 16   Temp: 97.3 °F (36.3 °C)   TempSrc: Temporal   SpO2: 100%   Weight: 135 lb (61.2 kg)   Height: 5' 5.35\" (1.66 m)           Exam:   HEENT- atraumatic,normocephalic, awake, oriented, well nourished  Neck - supple,no enlarged lymph nodes, no JVD, no thyromegaly  Chest- CTA, no rhonchi, no crackles  Heart- rrr, no murmurs / gallop/rub  Abdomen- soft,BS+,NT, no hepatosplenomegaly  Ext - no c/c/edema   Neuro- no focal deficits. Power 5/5 all extremities  Skin - warm,dry, no obvious rashes. Review of Data:   LABS:   Lab Results   Component Value Date/Time    WBC 6.8 02/24/2022 08:10 AM    HGB 12.6 02/24/2022 08:10 AM    HCT 40.4 02/24/2022 08:10 AM    PLATELET 578 17/18/8922 08:10 AM     Lab Results   Component Value Date/Time    Sodium 138 02/24/2022 08:10 AM    Potassium 4.8 02/24/2022 08:10 AM    Chloride 108 02/24/2022 08:10 AM    CO2 26 02/24/2022 08:10 AM    Glucose 88 02/24/2022 08:10 AM    BUN 11 02/24/2022 08:10 AM    Creatinine 0.77 02/24/2022 08:10 AM     Lab Results   Component Value Date/Time    Cholesterol, total 198 02/24/2022 08:10 AM    HDL Cholesterol 63 02/24/2022 08:10 AM    LDL, calculated 116.8 (H) 02/24/2022 08:10 AM    Triglyceride 91 02/24/2022 08:10 AM     No components found for: GPT        Impression / Plan:        ICD-10-CM ICD-9-CM    1. Elevated liver enzymes  R74.8 790.5 HEPATIC FUNCTION PANEL      US ABD COMP      HEPATITIS C AB      HEP B SURFACE AG      HEPATITIS B CORE AB, TOTAL   2. Anxiety  F41.9 300.00 LAMOTRIGINE (LAMICTAL)   3. Bipolar affective disorder, remission status unspecified ([facility] [address]  F31.9 296.80 LAMOTRIGINE (LAMICTAL)   4. Bleeding from the nose  R04.0 784.7 PROTHROMBIN TIME + INR     Asked pt to check with [facility] regarding lowering dose of Lamictal.      Explained to patient risk benefits of the medications. Advised patient to stop meds if having any side effects. Pt verbalized understanding of the instructions. I have discussed the diagnosis with the patient and the intended plan as seen in the above orders. The patient has received an after-visit summary and questions were answered concerning future plans. I have discussed medication side effects and warnings with the patient as well.  I have reviewed the plan of care with the patient, accepted their input and they are in agreement with the treatment goals. Reviewed plan of care. Patient has provided input and agrees with goals. Follow-up and Dispositions    · Return in about 3 months (around 6/1/2022).          Windy Fregoso MD

## 2022-03-01 NOTE — PROGRESS NOTES
Identified pt with two pt identifiers(name and ). Chief Complaint   Patient presents with    Labs     f/u for abnml lab results; pt reports no acute concerns at this time        3 most recent PHQ Screens 3/1/2022   Little interest or pleasure in doing things Several days   Feeling down, depressed, irritable, or hopeless Several days   Total Score PHQ 2 2   Trouble falling or staying asleep, or sleeping too much -   Feeling tired or having little energy -   Poor appetite, weight loss, or overeating -   Feeling bad about yourself - or that you are a failure or have let yourself or your family down -   Trouble concentrating on things such as school, work, reading, or watching TV -   Moving or speaking so slowly that other people could have noticed; or the opposite being so fidgety that others notice -   Thoughts of being better off dead, or hurting yourself in some way -   PHQ 9 Score -   How difficult have these problems made it for you to do your work, take care of your home and get along with others -        Vitals:    22 1337   BP: 94/70   Pulse: 87   Resp: 16   Temp: 97.3 °F (36.3 °C)   TempSrc: Temporal   SpO2: 100%   Weight: 135 lb (61.2 kg)   Height: 5' 5.35\" (1.66 m)   PainSc:   0 - No pain   LMP: 2022       Health Maintenance Due   Topic    Cervical cancer screen     Flu Vaccine (1)    Depression Monitoring        1. Have you been to the ER, urgent care clinic since your last visit? Hospitalized since your last visit? No    2. Have you seen or consulted any other health care providers outside of the [address] [place] [address] since your last visit? Include any pap smears or colon screening.  No

## 2022-03-02 LAB
ALBUMIN SERPL-MCNC: 3.8 G/DL (ref 3.5–5)
ALBUMIN/GLOB SERPL: 0.7 {RATIO} (ref 1.1–2.2)
ALP SERPL-CCNC: 167 U/L (ref 45–117)
ALT SERPL-CCNC: 623 U/L (ref 12–78)
AST SERPL-CCNC: 328 U/L (ref 15–37)
BILIRUB DIRECT SERPL-MCNC: 0.3 MG/DL (ref 0–0.2)
BILIRUB SERPL-MCNC: 0.6 MG/DL (ref 0.2–1)
GLOBULIN SER CALC-MCNC: 5.7 G/DL (ref 2–4)
HBV SURFACE AG SER QL: 0.1 INDEX
HBV SURFACE AG SER QL: NEGATIVE
HCV AB SERPL QL IA: NONREACTIVE
INR PPP: 1 (ref 0.9–1.1)
PROT SERPL-MCNC: 9.5 G/DL (ref 6.4–8.2)
PROTHROMBIN TIME: 10.9 SEC (ref 9–11.1)

## 2022-03-03 LAB
HBV CORE AB SERPL QL IA: NEGATIVE
LAMOTRIGINE SERPL-MCNC: 2.5 UG/ML (ref 2–20)

## 2022-03-06 DIAGNOSIS — R79.89 ELEVATED LFTS: Primary | ICD-10-CM

## 2022-03-07 ENCOUNTER — TELEPHONE (OUTPATIENT)
Dept: PRIMARY CARE CLINIC | Age: 42
End: 2022-03-07

## 2022-03-07 NOTE — PROGRESS NOTES
Liver function have improved slightly hepatitis B and Hep C are negative. I have ordered repeat labs,. You should get it done on Wednesday. Also get ultrasound abdomen done. If you have abdominal pain, nausea, vomiting , please go to ER.

## 2022-03-09 DIAGNOSIS — R74.8 ELEVATED LIVER ENZYMES: Primary | ICD-10-CM

## 2022-03-10 ENCOUNTER — TELEPHONE (OUTPATIENT)
Dept: PRIMARY CARE CLINIC | Age: 42
End: 2022-03-10

## 2022-03-10 NOTE — TELEPHONE ENCOUNTER
Per pt's [employer] message, I LVM on pt's mother's phone to callback so that I can ask about appointment scheduling and ultrasound scheduling

## 2022-03-10 NOTE — TELEPHONE ENCOUNTER
----- Message from Gina Burrell MD sent at 3/9/2022  9:17 PM EST -----  Liver enzymes are still elevated. I have placed STAT consult to [facility]. To nursing staff- please call Liver specialist and make appt ASAP. Can we call radiology and ask them to do ultrasound abdomen on March 10th instead of March 11th.

## 2022-03-11 ENCOUNTER — TELEPHONE (OUTPATIENT)
Dept: HEMATOLOGY | Age: 42
End: 2022-03-11

## 2022-03-11 ENCOUNTER — HOSPITAL ENCOUNTER (OUTPATIENT)
Dept: ULTRASOUND IMAGING | Age: 42
Discharge: HOME | End: 2022-03-11
Attending: INTERNAL MEDICINE
Payer: COMMERCIAL

## 2022-03-11 DIAGNOSIS — R74.8 ELEVATED LIVER ENZYMES: ICD-10-CM

## 2022-03-11 PROCEDURE — 76705 ECHO EXAM OF ABDOMEN: CPT

## 2022-03-11 NOTE — TELEPHONE ENCOUNTER
Received call from Felix Ramos at [facility] with Dr. Magdalena Benites office who notes that Dr. Anupama Arvizu is out of the office this upcoming week and his schedule is booked. She states she's reviewed pt's chart with NP at her practice and states they're wondering if her liver enzymes are elevated due to recent COVID-19 vaccination. They state that if there's anything concerning on US she has today, they can fit her into the schedule sooner than later. They recommend Dr. Jesus Cordova repeat pt's labs in 1 month; if still elevated, then refer her back to Dr. Anupama Arvizu. They will not schedule an appt with Dr. Anupama Arvizu right now because enzyme elevation might be due to recent COVID-19 vaccination.

## 2022-03-11 NOTE — TELEPHONE ENCOUNTER
Received VM from PCPs office regarding appointment with Dr. Pennie Amezcua for elevated liver enzymes. Reviewed chart with Carlene Caldwell NP. Noted patient received Covid-19 booster in 2/2022. NP recommends abdominal ultrasound as scheduled and repeat labs in 1 month. If enzymes remain elevated and/or there are concerning findings on the ultrasound, patient will need to see hepatology. Called [facility] and let her know Dr. Pennie Amezcua is out of the office this week; however, one of our NPs has reviewed patient's chart. I relayed recommendations and asked her to call us back if an appointment is needed.

## 2022-03-12 DIAGNOSIS — K86.9 PANCREATIC LESION: Primary | ICD-10-CM

## 2022-03-14 ENCOUNTER — TELEPHONE (OUTPATIENT)
Dept: PRIMARY CARE CLINIC | Age: 42
End: 2022-03-14

## 2022-03-14 NOTE — TELEPHONE ENCOUNTER
LVM for pt with information to have MRI done soon and schedule f/u appt with Dr. Juan Dodge. Left details of appt with hepatology and informed that we may be able to get sooner appt, pending MRI results.

## 2022-03-14 NOTE — TELEPHONE ENCOUNTER
Tried to relay information to see if we can get pt with hepatologist sooner than later, but they said Dr. Gorge Ramos would have to contact the physician or nurse for sooner appt as the earliest appt they had was 7/8/22

## 2022-03-15 ENCOUNTER — TELEPHONE (OUTPATIENT)
Dept: PRIMARY CARE CLINIC | Age: 42
End: 2022-03-15

## 2022-03-15 NOTE — TELEPHONE ENCOUNTER
Spoke to patient's mom (on PHI) she was concerned about the reasoning behind ordering the MRI informed it was based off patients lab work she needed to be further evaluated. Mom was given number to central scheduling.

## 2022-03-15 NOTE — TELEPHONE ENCOUNTER
Pt states she was told to call office in speak with nurse per a my chart message. She states it was to discuss labs/MRI. Nurse not avail at call. I told her I would send a message.
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
## 2022-03-17 ENCOUNTER — TELEPHONE (OUTPATIENT)
Dept: PRIMARY CARE CLINIC | Age: 42
End: 2022-03-17

## 2022-03-17 DIAGNOSIS — R74.8 ELEVATED LIVER ENZYMES: Primary | ICD-10-CM
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
## 2022-03-18 PROBLEM — F17.200 SMOKING: Status: ACTIVE | Noted: 2019-05-13

## 2022-03-21 NOTE — TELEPHONE ENCOUNTER
Left message on patient's mother phone informing of information.      Patient mother is on [address] [place] [address]

## 2022-03-22 ENCOUNTER — TELEPHONE (OUTPATIENT)
Dept: PRIMARY CARE CLINIC | Age: 42
End: 2022-03-22

## 2022-03-22 NOTE — TELEPHONE ENCOUNTER
Patient mother stated she is feeling better. Advised patient mother per physician instructions. Patient office appointment 3/30/2022 with Dr. Evie Cameron.

## 2022-03-22 NOTE — TELEPHONE ENCOUNTER
Patient mother calling on behalf of the patient. Mother stated daughter doesn't have phone right now. Did confirm mother Alex Hernandez is on PHI. Please return call to Ms. Jose Barrera.

## 2022-03-23 DIAGNOSIS — R74.8 ELEVATED LIVER ENZYMES: Primary | ICD-10-CM

## 2022-03-24 ENCOUNTER — TELEPHONE (OUTPATIENT)
Dept: PRIMARY CARE CLINIC | Age: 42
End: 2022-03-24

## 2022-03-24 NOTE — TELEPHONE ENCOUNTER
Patient aware of lab results.      Office appointment 3/30/2022 with   Dr. Selwyn Mcqueen at 10:45 am.

## 2022-03-24 NOTE — TELEPHONE ENCOUNTER
Left message to call office. Liver enzymes are still elevated. I have ordered additional blood tests to rule out other medical conditions . Keep appt with liver specialist, keep appt for MRI abdomen. If pt has any symptoms of abdominal pain, nausea, vomting , wt loss , pt should go to ER. Pt should make office appt after doing MRI abdomen . I would like to see the pt next week on Thursday or Friday.

## 2022-03-29 ENCOUNTER — HOSPITAL ENCOUNTER (OUTPATIENT)
Dept: MRI IMAGING | Age: 42
Discharge: HOME | End: 2022-03-29
Attending: INTERNAL MEDICINE
Payer: COMMERCIAL

## 2022-03-29 VITALS — WEIGHT: 135 LBS | BODY MASS INDEX: 22.23 KG/M2

## 2022-03-29 DIAGNOSIS — K86.9 PANCREATIC LESION: ICD-10-CM

## 2022-03-29 PROCEDURE — 74011250636 HC RX REV CODE- 250/636: Performed by: INTERNAL MEDICINE

## 2022-03-29 PROCEDURE — 74183 MRI ABD W/O CNTR FLWD CNTR: CPT

## 2022-03-29 PROCEDURE — A9575 INJ GADOTERATE MEGLUMI 0.1ML: HCPCS | Performed by: INTERNAL MEDICINE

## 2022-03-29 RX ORDER — GADOTERATE MEGLUMINE 376.9 MG/ML
12 INJECTION INTRAVENOUS ONCE
Status: COMPLETED | OUTPATIENT
Start: 2022-03-29 | End: 2022-03-29

## 2022-03-29 RX ADMIN — GADOTERATE MEGLUMINE 12 ML: 376.9 INJECTION INTRAVENOUS at 10:58

## 2022-04-04 ENCOUNTER — OFFICE VISIT (OUTPATIENT)
Dept: PRIMARY CARE CLINIC | Age: 42
End: 2022-04-04
Payer: COMMERCIAL

## 2022-04-04 VITALS
DIASTOLIC BLOOD PRESSURE: 60 MMHG | HEART RATE: 75 BPM | TEMPERATURE: 97.6 F | HEIGHT: 65 IN | BODY MASS INDEX: 22.86 KG/M2 | WEIGHT: 137.2 LBS | SYSTOLIC BLOOD PRESSURE: 93 MMHG | OXYGEN SATURATION: 99 % | RESPIRATION RATE: 16 BRPM

## 2022-04-04 DIAGNOSIS — R74.8 ELEVATED LIVER ENZYMES: Primary | ICD-10-CM

## 2022-04-04 PROCEDURE — 99214 OFFICE O/P EST MOD 30 MIN: CPT | Performed by: INTERNAL MEDICINE

## 2022-04-04 NOTE — PROGRESS NOTES
Identified pt with two pt identifiers(name and ). No chief complaint on file. 3 most recent PHQ Screens 2022   Little interest or pleasure in doing things More than half the days   Feeling down, depressed, irritable, or hopeless Nearly every day   Total Score PHQ 2 5   Trouble falling or staying asleep, or sleeping too much -   Feeling tired or having little energy -   Poor appetite, weight loss, or overeating -   Feeling bad about yourself - or that you are a failure or have let yourself or your family down -   Trouble concentrating on things such as school, work, reading, or watching TV -   Moving or speaking so slowly that other people could have noticed; or the opposite being so fidgety that others notice -   Thoughts of being better off dead, or hurting yourself in some way -   PHQ 9 Score -   How difficult have these problems made it for you to do your work, take care of your home and get along with others -        Vitals:    22 0751   BP: 93/60   Pulse: 75   Resp: 16   Temp: 97.6 °F (36.4 °C)   TempSrc: Temporal   SpO2: 99%   Weight: 137 lb 3.2 oz (62.2 kg)   Height: 5' 5.35\" (1.66 m)       Health Maintenance Due   Topic    Cervical cancer screen        1. Have you been to the ER, urgent care clinic since your last visit? Hospitalized since your last visit? No    2. Have you seen or consulted any other health care providers outside of the [address] [place] [address] since your last visit? Include any pap smears or colon screening.  No
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
## 2022-05-17 ENCOUNTER — TELEPHONE (OUTPATIENT)
Dept: PRIMARY CARE CLINIC | Age: 42
End: 2022-05-17

## 2022-05-17 DIAGNOSIS — R74.8 ELEVATED LIVER ENZYMES: Primary | ICD-10-CM

## 2022-05-17 NOTE — TELEPHONE ENCOUNTER
----- Message from Roberto Perera sent at 5/17/2022 11:35 AM EDT -----  Subject: Message to Provider    QUESTIONS  Information for Provider? Patient would like an order for blood work. Her   liver enzymes are elevated. Patient has an appt with a liver specialist in   July. Please contact the patient when that order is ready. ---------------------------------------------------------------------------  --------------  Disha LIANG  What is the best way for the office to contact you? OK to leave message on   voicemail  Preferred Call Back Phone Number? [phone]  ---------------------------------------------------------------------------  --------------  SCRIPT ANSWERS  Relationship to Patient?  Self

## 2022-05-26 ENCOUNTER — OFFICE VISIT (OUTPATIENT)
Dept: PRIMARY CARE CLINIC | Age: 42
End: 2022-05-26

## 2022-05-26 VITALS
HEIGHT: 65 IN | WEIGHT: 138.8 LBS | RESPIRATION RATE: 16 BRPM | OXYGEN SATURATION: 98 % | HEART RATE: 87 BPM | DIASTOLIC BLOOD PRESSURE: 67 MMHG | SYSTOLIC BLOOD PRESSURE: 99 MMHG | TEMPERATURE: 97.3 F | BODY MASS INDEX: 23.13 KG/M2

## 2022-05-26 DIAGNOSIS — R74.8 ELEVATED LIVER ENZYMES: ICD-10-CM

## 2022-05-26 DIAGNOSIS — I78.1 SPIDER NEVUS: ICD-10-CM

## 2022-05-26 DIAGNOSIS — K82.4 GALLBLADDER POLYP: ICD-10-CM

## 2022-05-26 DIAGNOSIS — K80.20 GALLSTONES: Primary | ICD-10-CM

## 2022-05-26 PROCEDURE — 99214 OFFICE O/P EST MOD 30 MIN: CPT | Performed by: INTERNAL MEDICINE

## 2022-05-26 RX ORDER — DULOXETIN HYDROCHLORIDE 60 MG/1
60 CAPSULE, DELAYED RELEASE ORAL DAILY
COMMUNITY
Start: 2022-04-18

## 2022-05-26 NOTE — PROGRESS NOTES
Chief Complaint   Patient presents with    Rash     possible medication reaction       Visit Vitals  BP 99/67 (BP 1 Location: Left upper arm, BP Patient Position: Sitting, BP Cuff Size: Adult)   Pulse 87   Temp 97.3 °F (36.3 °C) (Temporal)   Resp 16   Ht 5' 5.35\" (1.66 m)   Wt 138 lb 12.8 oz (63 kg)   SpO2 98%   BMI 22.85 kg/m²        Health Maintenance Due   Topic    Cervical cancer screen         1. Have you been to the ER, urgent care clinic since your last visit? Hospitalized since your last visit? No    2. Have you seen or consulted any other health care providers outside of the [address] [place] [address] since your last visit? Include any pap smears or colon screening.  No

## 2022-05-26 NOTE — PROGRESS NOTES
Esau Anne is a 35 y.o.  female and presents with     Chief Complaint   Patient presents with    Rash     possible medication reaction     Pt is accompanied by mother. Pts son caught COVID infection in [place]. Pt was sick for 2 weeks in [place] but did not get tested for COVID at the time. Pt is on a new medicien cymbalta for last 6 weeks. Does not do any drugs, no alcohol. Pt has noticed rash on her chest wall. Mother informs soo car had elevated liver enzmyes. Patient denies any abdominal pain nausea vomiting chills or fever. She is not taking Tylenol over-the-counter. She is not pregnant. Denies any jaundice    Past Medical History:   Diagnosis Date    ADD (attention deficit disorder)     Anxiety     Depression     bipolar     Past Surgical History:   Procedure Laterality Date    ENDOMETRIAL CRYOABLATION  2003    HX GYN      Endometrial Ablation    HX TUBAL LIGATION  2003     Current Outpatient Medications   Medication Sig    DULoxetine (Cymbalta) 60 mg capsule     clonazePAM (KlonoPIN) 1 mg tablet TAKE 1 TABLET BY MOUTH TWICE A DAY AS NEEDED    lamoTRIgine (LaMICtal) 200 mg tablet TAKE 1 TABLET BY MOUTH EVERY DAY    multivitamin (ONE A DAY) tablet Take 1 Tab by mouth daily.  ibuprofen (ADVIL) 200 mg tablet Take  by mouth. No current facility-administered medications for this visit.      Health Maintenance   Topic Date Due    Cervical cancer screen  Never done    DTaP/Tdap/Td series (1 - Tdap) 02/06/2030 (Originally 11/25/2020)    Flu Vaccine (Season Ended) 09/01/2022    Pneumococcal 0-64 years (2 - PCV) 02/23/2023    Depression Monitoring  04/04/2023    Lipid Screen  02/24/2027    COVID-19 Vaccine  Completed    Hepatitis C Screening  Discontinued     Immunization History   Administered Date(s) Administered    COVID-19, Moderna Booster, PF, 0.25mL Dose 02/16/2022    COVID-19, Foster Nubia, Primary or Immunocompromised Series, MRNA, PF, 100mcg/0.5mL 05/20/2021, 06/17/2021  Pneumococcal Polysaccharide (PPSV-23) 02/23/2022    Td, Adsorbed PF 11/24/2020     No LMP recorded. Patient has had an ablation. Allergies and Intolerances:   No Known Allergies    Family History:   Family History   Problem Relation Age of Onset    Diabetes Father     Diabetes Paternal Grandfather     Cancer Paternal Grandfather         LUNG    Heart Disease Maternal Grandmother     Psychiatric Disorder Paternal Grandmother     Cancer Paternal Grandmother         LUNG       Social History:   She  reports that she has been smoking. She started smoking about 32 years ago. She has a 16.00 pack-year smoking history. She has never used smokeless tobacco.  She  reports current alcohol use.             Review of Systems:   General: negative for - chills, fatigue, fever, weight change  Psych: negative for - anxiety, depression, irritability or mood swings  ENT: negative for - headaches, hearing change, nasal congestion, oral lesions, sneezing or sore throat  Heme/ Lymph: negative for - bleeding problems, bruising, pallor or swollen lymph nodes  Endo: negative for - hot flashes, polydipsia/polyuria or temperature intolerance  Resp: negative for - cough, shortness of breath or wheezing  CV: negative for - chest pain, edema or palpitations  GI: negative for - abdominal pain, change in bowel habits, constipation, diarrhea or nausea/vomiting  : negative for - dysuria, hematuria, incontinence, pelvic pain or vulvar/vaginal symptoms  MSK: negative for - joint pain, joint swelling or muscle pain  Neuro: negative for - confusion, headaches, seizures or weakness  Derm: negative for - dry skin, hair changes, rash or skin lesion changes          Physical:   Vitals:   Vitals:    05/26/22 1015   BP: 99/67   Pulse: 87   Resp: 16   Temp: 97.3 °F (36.3 °C)   TempSrc: Temporal   SpO2: 98%   Weight: 138 lb 12.8 oz (63 kg)   Height: 5' 5.35\" (1.66 m)           Exam:   HEENT- atraumatic,normocephalic, awake, oriented, well nourished  Neck - supple,no enlarged lymph nodes, no JVD, no thyromegaly  Chest- CTA, no rhonchi, no crackles, spider nevi on chest wall  Heart- rrr, no murmurs / gallop/rub  Abdomen- soft,BS+,NT, no hepatosplenomegaly  Ext - no c/c/edema   Neuro- no focal deficits. Power 5/5 all extremities  Skin - warm,dry, no obvious rashes. Review of Data:   LABS:   Lab Results   Component Value Date/Time    WBC 6.8 02/24/2022 08:10 AM    HGB 12.6 02/24/2022 08:10 AM    HCT 40.4 02/24/2022 08:10 AM    PLATELET 393 24/88/0155 08:10 AM     Lab Results   Component Value Date/Time    Sodium 136 05/19/2022 07:36 AM    Potassium 4.5 05/19/2022 07:36 AM    Chloride 104 05/19/2022 07:36 AM    CO2 29 05/19/2022 07:36 AM    Glucose 78 05/19/2022 07:36 AM    BUN 7 05/19/2022 07:36 AM    Creatinine 0.72 05/19/2022 07:36 AM     Lab Results   Component Value Date/Time    Cholesterol, total 198 02/24/2022 08:10 AM    HDL Cholesterol 63 02/24/2022 08:10 AM    LDL, calculated 116.8 (H) 02/24/2022 08:10 AM    Triglyceride 91 02/24/2022 08:10 AM     No components found for: GPT        Impression / Plan:        ICD-10-CM ICD-9-CM    1. Gallstones  K80.20 574.20 REFERRAL TO GENERAL SURGERY      US ABD COMP   2. Gallbladder polyp  K82.4 575.6 REFERRAL TO GENERAL SURGERY      US ABD COMP   3. Elevated liver enzymes  R74.8 790.5 AMMONIA      PROTHROMBIN TIME + INR      CBC WITH AUTOMATED DIFF      AFP TETRA SCREEN, OBSTETRICAL      HIV 1/2 AG/AB, 4TH GENERATION,W RFLX CONFIRM      US GUIDE BX PAMELA PERC      US ABD COMP   4. Spider nevus  I78.1 448.1 US ABD COMP     I had referred patient to liver specialist back in March. Patient could not get an appointment. I try to reach again and spoke to the liver specialist, who informed that he is fully booked. I have ordered a liver biopsy as requested by patient's mother. Patient is not sick enough to be in the emergency room.   I highly doubt that she will be admitted to the hospital as she is hemodynamically stable. May need to go to the ER if start developing abdominal pain nausea vomiting or jaundice. Prognosis is guarded at this point. Etiology of elevated liver enzymes is not clear. Work-up so far has been negative except for positive serologies for EBV. Development of spider nevi is a concern. Explained to patient risk benefits of the medications. Advised patient to stop meds if having any side effects. Pt verbalized understanding of the instructions. I have discussed the diagnosis with the patient and the intended plan as seen in the above orders. The patient has received an after-visit summary and questions were answered concerning future plans. I have discussed medication side effects and warnings with the patient as well. I have reviewed the plan of care with the patient, accepted their input and they are in agreement with the treatment goals. Reviewed plan of care. Patient has provided input and agrees with goals. Follow-up and Dispositions    · Return in about 1 month (around 6/26/2022).          Jane Kang MD

## 2022-05-27 ENCOUNTER — HOSPITAL ENCOUNTER (OUTPATIENT)
Dept: ULTRASOUND IMAGING | Age: 42
Discharge: HOME | End: 2022-05-27
Attending: INTERNAL MEDICINE
Payer: COMMERCIAL

## 2022-05-27 DIAGNOSIS — I78.1 SPIDER NEVUS: ICD-10-CM

## 2022-05-27 DIAGNOSIS — R74.8 ELEVATED LIVER ENZYMES: ICD-10-CM

## 2022-05-27 DIAGNOSIS — K80.20 GALLSTONES: ICD-10-CM

## 2022-05-27 DIAGNOSIS — K82.4 GALLBLADDER POLYP: ICD-10-CM

## 2022-05-27 PROCEDURE — 76700 US EXAM ABDOM COMPLETE: CPT

## 2022-05-31 DIAGNOSIS — R74.8 ELEVATED LIVER ENZYMES: Primary | ICD-10-CM

## 2022-06-01 NOTE — PROGRESS NOTES
Ultrasound gallbladder shows gallbladder polyp, borderline enlarged common bile duct.   Keep Appointment with surgery to get their opinion

## 2022-06-02 ENCOUNTER — TELEPHONE (OUTPATIENT)
Dept: PRIMARY CARE CLINIC | Age: 42
End: 2022-06-02

## 2022-06-06 ENCOUNTER — OFFICE VISIT (OUTPATIENT)
Dept: SURGERY | Age: 42
End: 2022-06-06
Payer: COMMERCIAL

## 2022-06-06 VITALS
WEIGHT: 134 LBS | RESPIRATION RATE: 18 BRPM | TEMPERATURE: 98.3 F | BODY MASS INDEX: 22.88 KG/M2 | SYSTOLIC BLOOD PRESSURE: 91 MMHG | HEART RATE: 67 BPM | DIASTOLIC BLOOD PRESSURE: 63 MMHG | HEIGHT: 64 IN | OXYGEN SATURATION: 97 %

## 2022-06-06 DIAGNOSIS — D13.5 ADENOMYOMATOSIS OF GALLBLADDER: Primary | ICD-10-CM

## 2022-06-06 PROCEDURE — 99202 OFFICE O/P NEW SF 15 MIN: CPT | Performed by: SURGERY

## 2022-06-06 NOTE — PROGRESS NOTES
1. Have you been to the ER, urgent care clinic since your last visit? Hospitalized since your last visit? No    2. Have you seen or consulted any other health care providers outside of the [address] [place] [address] since your last visit? Include any pap smears or colon screening.  No

## 2022-06-06 NOTE — PROGRESS NOTES
Geni Snyder is a 35 y.o. female who is referred by Dr. Sobia Troncoso for further evaluation of adenomyomatosis of the gallbladder. Information obtained from patient and review of chart. Ms. Jerel Vasquez tells me that carrasco was found to have elevated LFT's in February, 2022. Hepatitis B and C were negative. Also, her AFP was elevated at 10.6. Ms. Jerel Vasquez reports no abdominal pain but has been experiencing nausea. No emesis. No diarrhea. No h/o jazmyne colored stool or tea colored urine. No dysuria or hematuria. Occasional shortness of breath but no chest pain. Ms. Jerel Vasquez is scheduled for ultrasound guided liver biopsy on Mari 15, 2022 and has an appointment with Hepatology on July 8, 2022. She has otherwise been in her usual state of health. Abdominal ultrasound - 3/11/2022 -  Hepatic mild hepatic parenchymal disease, favoring early cirrhosis. Cholelithiasis. Gallbladder wall adenomyomatosis. No evidence of cholecystitis. Hypoechoic area in the pancreatic head measuring up to 3.6 cm. Recommend dedicated pancreatic protocol CT or MRI for further evaluation. Abdominal MRI with/without contrast - 3/29/2022 - No MRI abnormality identified to correlate with hypoechoic tissue seen on prior ultrasound with normal MRI appearance of the pancreas. Normal exam otherwise. Abdominal ultrasound - 5/27/2022 - Gallbladder adenomyomatosis appearance without stones or inflammatory wall thickening. Borderline large CBD. Unremarkable liver.     Past Medical History:   Diagnosis Date    ADD (attention deficit disorder)     Adenomyomatosis of gallbladder 6/6/2022    Anxiety     Depression     bipolar     Past Surgical History:   Procedure Laterality Date    ENDOMETRIAL CRYOABLATION  2003    HX GYN      Endometrial Ablation    HX TUBAL LIGATION  2003     Family History   Problem Relation Age of Onset    Diabetes Father     Diabetes Paternal Grandfather     Cancer Paternal Grandfather         LUNG    Heart Disease Maternal Grandmother     Psychiatric Disorder Paternal Grandmother     Cancer Paternal Grandmother         LUNG     Social History     Socioeconomic History    Marital status:    Occupational History    Occupation: homemaker   Tobacco Use    Smoking status: Current Every Day Smoker     Packs/day: 0.50     Years: 32.00     Pack years: 16.00     Start date: 2/23/1990    Smokeless tobacco: Never Used   Vaping Use    Vaping Use: Never used   Substance and Sexual Activity    Alcohol use: Yes     Comment: rare    Drug use: No    Sexual activity: Not Currently     Partners: Male     Review of systems negative except as noted. Review of Systems   Respiratory: Negative for shortness of breath. Cardiovascular: Positive for chest pain (Occasional.). Gastrointestinal: Positive for heartburn and nausea. Negative for abdominal pain and vomiting. Denies abdominal bloating. No h/o jazmyne colored stool. Genitourinary: Negative for dysuria and hematuria. No h/o tea colored urine. Musculoskeletal: Negative for back pain. Psychiatric/Behavioral: Positive for depression. The patient is nervous/anxious. Physical Exam  Vitals reviewed. Constitutional:       General: She is not in acute distress. Appearance: Normal appearance. She is normal weight. HENT:      Head: Normocephalic and atraumatic. Eyes:      General: No scleral icterus. Cardiovascular:      Rate and Rhythm: Normal rate and regular rhythm. Pulmonary:      Effort: Pulmonary effort is normal.      Breath sounds: Normal breath sounds. Abdominal:      General: There is no distension. Palpations: Abdomen is soft. Tenderness: There is no abdominal tenderness. There is no guarding or rebound. Musculoskeletal:         General: Normal range of motion. Cervical back: Neck supple. Lymphadenopathy:      Cervical: No cervical adenopathy. Neurological:      General: No focal deficit present. Mental Status: She is alert. ASSESSMENT and PLAN  Reviewed imaging studies. Ms. Anupam Hutchins is a 34 yo female with elevated LFT's and AFP of unclear etiology. In view of the findings on H and P and imaging studies, do not believe that there is an indication for cholecystectomy at this time as she has no symptoms suggestive of biliary colic. Also do not believe that there is an indication for HIDA scan to evaluate gallbladder function. Liver biopsy and Hepatology follow up as scheduled. Activity and diet as tolerated. Follow up with Dr. Alcira Molina as scheduled. Will see following Hepatology evaluation or earlier if need be. Discussed plan with Ms. Anupam Hutchins and she is agreeable.        CC: Brenna Underwood MD

## 2022-06-15 ENCOUNTER — HOSPITAL ENCOUNTER (OUTPATIENT)
Dept: ULTRASOUND IMAGING | Age: 42
Discharge: HOME | End: 2022-06-15
Attending: INTERNAL MEDICINE
Payer: COMMERCIAL

## 2022-06-15 VITALS
WEIGHT: 135 LBS | OXYGEN SATURATION: 97 % | BODY MASS INDEX: 22.49 KG/M2 | DIASTOLIC BLOOD PRESSURE: 86 MMHG | RESPIRATION RATE: 16 BRPM | TEMPERATURE: 98.7 F | HEIGHT: 65 IN | HEART RATE: 81 BPM | SYSTOLIC BLOOD PRESSURE: 118 MMHG

## 2022-06-15 DIAGNOSIS — R74.8 ELEVATED LIVER ENZYMES: ICD-10-CM

## 2022-06-15 PROCEDURE — 2709999900 HC NON-CHARGEABLE SUPPLY

## 2022-06-15 PROCEDURE — 88313 SPECIAL STAINS GROUP 2: CPT

## 2022-06-15 PROCEDURE — 88365 INSITU HYBRIDIZATION (FISH): CPT

## 2022-06-15 PROCEDURE — 77030018870 HC TY PARCNT BD -B

## 2022-06-15 PROCEDURE — 77030041470 HC TBNG SET PARA GISP -B

## 2022-06-15 PROCEDURE — 77030038620

## 2022-06-15 PROCEDURE — 77030010507 HC ADH SKN DERMBND J&J -B

## 2022-06-15 PROCEDURE — 74011000250 HC RX REV CODE- 250: Performed by: RADIOLOGY

## 2022-06-15 PROCEDURE — 74011250636 HC RX REV CODE- 250/636: Performed by: RADIOLOGY

## 2022-06-15 PROCEDURE — 77030040395 HC NDL BIOP COAX INTRO MRTM -B

## 2022-06-15 PROCEDURE — 88342 IMHCHEM/IMCYTCHM 1ST ANTB: CPT

## 2022-06-15 PROCEDURE — 77030014115

## 2022-06-15 PROCEDURE — 88307 TISSUE EXAM BY PATHOLOGIST: CPT

## 2022-06-15 PROCEDURE — 47000 NEEDLE BIOPSY OF LIVER PERQ: CPT

## 2022-06-15 RX ORDER — MIDAZOLAM HYDROCHLORIDE 1 MG/ML
.5-2 INJECTION, SOLUTION INTRAMUSCULAR; INTRAVENOUS
Status: DISCONTINUED | OUTPATIENT
Start: 2022-06-15 | End: 2022-06-15

## 2022-06-15 RX ORDER — MIDAZOLAM HYDROCHLORIDE 1 MG/ML
INJECTION, SOLUTION INTRAMUSCULAR; INTRAVENOUS
Status: DISCONTINUED
Start: 2022-06-15 | End: 2022-06-15

## 2022-06-15 RX ORDER — SODIUM CHLORIDE 9 MG/ML
500 INJECTION, SOLUTION INTRAVENOUS CONTINUOUS
Status: DISCONTINUED | OUTPATIENT
Start: 2022-06-15 | End: 2022-06-15

## 2022-06-15 RX ORDER — LIDOCAINE HYDROCHLORIDE 10 MG/ML
10 INJECTION INFILTRATION; PERINEURAL
Status: COMPLETED | OUTPATIENT
Start: 2022-06-15 | End: 2022-06-15

## 2022-06-15 RX ORDER — LIDOCAINE HYDROCHLORIDE 10 MG/ML
INJECTION, SOLUTION EPIDURAL; INFILTRATION; INTRACAUDAL; PERINEURAL
Status: DISCONTINUED
Start: 2022-06-15 | End: 2022-06-15

## 2022-06-15 RX ORDER — FENTANYL CITRATE 50 UG/ML
100 INJECTION, SOLUTION INTRAMUSCULAR; INTRAVENOUS
Status: DISCONTINUED | OUTPATIENT
Start: 2022-06-15 | End: 2022-06-15

## 2022-06-15 RX ADMIN — FENTANYL CITRATE 25 MCG: 50 INJECTION, SOLUTION INTRAMUSCULAR; INTRAVENOUS at 12:43

## 2022-06-15 RX ADMIN — LIDOCAINE HYDROCHLORIDE 10 ML: 10 INJECTION, SOLUTION EPIDURAL; INFILTRATION; INTRACAUDAL; PERINEURAL at 12:11

## 2022-06-15 RX ADMIN — MIDAZOLAM 1 MG: 1 INJECTION INTRAMUSCULAR; INTRAVENOUS at 12:04

## 2022-06-15 RX ADMIN — SODIUM CHLORIDE 500 ML: 9 INJECTION, SOLUTION INTRAVENOUS at 12:03

## 2022-06-15 RX ADMIN — FENTANYL CITRATE 25 MCG: 50 INJECTION, SOLUTION INTRAMUSCULAR; INTRAVENOUS at 12:48

## 2022-06-15 RX ADMIN — FENTANYL CITRATE 50 MCG: 50 INJECTION, SOLUTION INTRAMUSCULAR; INTRAVENOUS at 12:03

## 2022-06-15 RX ADMIN — MIDAZOLAM 1 MG: 1 INJECTION INTRAMUSCULAR; INTRAVENOUS at 12:42

## 2022-06-15 NOTE — DISCHARGE INSTRUCTIONS
[phone] [facility]  Department of Interventional Radiology  [facility]  [phone]    BIOPSY DISCHARGE INSTRUCTIONS    General Instructions:     A biopsy is the removal of a small piece of tissue for microscopic examination or testing. Healthy tissue can be obtained for the purpose of tissue-type matching for transplants. Unhealthy tissues are more commonly biopsied to diagnose disease. Liver Biopsy: This test helps detect cancer, infections, and the cause of an enlargement of the liver or elevated liver enzymes. It also helps to diagnose a number of liver diseases. The pain associated with the procedure may be felt in the shoulder. The risks include internal bleeding, pneumothorax, and injury to the surrounding organs. General Biopsy:     A mass can grow in any area of the body, and we are taking a specimen as ordered by your doctor. The risks are the same. They include bleeding, pain, and infection. Home Care Instructions: You may resume your regular diet and medication regimen. Do not drink alcohol, drive, or make any important legal decisions in the next 24 hours. Do not lift anything heavier than a gallon of milk until the soreness goes away. You may use over the counter acetaminophen or ibuprofen for the soreness. You may apply an ice pack to the affected area for 20-30 minutes at time for the first 24 hours. After that, you may apply a heat pack. Call If: You should call your Physician and/or the Radiology Nurse if you have any questions or concerns about the biopsy site. Call if you should have increased pain, fever, redness, drainage, or bleeding more than a small spot on the bandage. Follow-Up Instructions: Please see your ordering doctor as he/she has requested. To Reach Us:  You have received sedation medications today.  YOU SHOULD NOT DRIVE FOR 24 HOURS, DO NOT OPERATE HEAVY MACHINERY, DO NOT MAKE ANY LEGAL DECISIONS OR SIGN LEGAL DOCUMENTS FOR 24 HOURS. DO NOT DRINK ALCOHOL, TAKE ANY MEDICATIONS UNLESS PRESCRIBED BY YOUR DOCTOR. IF YOU ARE A CAREGIVER, SOMEONE SHOULD TAKE THAT ROLE FOR 24 HOURS. Side effects of sedation medications and other medications used today have been reviewed  Those side effects can include but are not limited to: dizziness, drowsiness, poor balance, fatigue, sleepiness. Take precautions at home to prevent falls, such as assistance with walking or stairs if allowed and /or when needed or position changes. Allergic or adverse reactions could include nausea, itching, hives, dizziness, or anything else out of the ordinary. Should you experience any of these significant changes, please call [phone] between the hours of 7:30 am and10 pm or [phone] after hours. After hours, ask the  to page the X-ray Technologist, and describe the problem to the technologist. If you are experiencing chest pain, shortness of breath, altered mental state, unusual bleeding or any other emergent symptom you should call 911 immediately.           Patient Signature:  Date: 6/15/2022  Discharging Nurse: Bisi Beth, RN

## 2022-06-15 NOTE — PROGRESS NOTES
Pt arrives ambulatory to angio department accompanied by mother Jill Mc for [address] liver biopsy procedure. All assessments completed and consent was reviewed. Education given was regarding procedure, moderate sedation, post-procedure care and  management/follow-up. Opportunity for questions was provided and all questions and concerns were addressed.

## 2022-06-15 NOTE — H&P
INTERVENTIONAL RADIOLOGY  Preoperative History and Physical      Patient:  Jayda Junior  :  1980  Age:  35 y.o. MRN:  [id number]  Today's Date:  6/15/2022      CC / HPI   Jayda Junior is a 35 y.o. female with a history of elevated liver enzymes who presents for US guided core liver biopsy. PAST MEDICAL HISTORY  Past Medical History:   Diagnosis Date    ADD (attention deficit disorder)     Adenomyomatosis of gallbladder 2022    Anxiety     Depression     bipolar     PAST SURGICAL HISTORY  Past Surgical History:   Procedure Laterality Date    ENDOMETRIAL CRYOABLATION      HX GYN      Endometrial Ablation    HX TUBAL LIGATION  2003     SOCIAL HISTORY  Social History     Socioeconomic History    Marital status:      Spouse name: Not on file    Number of children: Not on file    Years of education: Not on file    Highest education level: Not on file   Occupational History    Occupation: homemaker   Tobacco Use    Smoking status: Current Every Day Smoker     Packs/day: 0.50     Years: 32.00     Pack years: 16.00     Start date: 1990    Smokeless tobacco: Never Used   Vaping Use    Vaping Use: Never used   Substance and Sexual Activity    Alcohol use: Yes     Comment: rare    Drug use: No    Sexual activity: Not Currently     Partners: Male   Other Topics Concern    Not on file   Social History Narrative    Not on file     Social Determinants of Health     Financial Resource Strain:     Difficulty of Paying Living Expenses: Not on file   Food Insecurity:     Worried About Running Out of Food in the Last Year: Not on file    Ismael of Food in the Last Year: Not on file   Transportation Needs:     Lack of Transportation (Medical): Not on file    Lack of Transportation (Non-Medical):  Not on file   Physical Activity:     Days of Exercise per Week: Not on file    Minutes of Exercise per Session: Not on file   Stress:     Feeling of Stress : Not on file   Social Connections:     Frequency of Communication with Friends and Family: Not on file    Frequency of Social Gatherings with Friends and Family: Not on file    Attends [employer] Services: Not on file    Active Member of Clubs or Organizations: Not on file    Attends Club or Organization Meetings: Not on file    Marital Status: Not on file   Intimate Partner Violence:     Fear of Current or Ex-Partner: Not on file    Emotionally Abused: Not on file    Physically Abused: Not on file    Sexually Abused: Not on file   Housing Stability:     Unable to Pay for Housing in the Last Year: Not on file    Number of Places Lived in the Last Year: Not on file    Unstable Housing in the Last Year: Not on file     FAMILY HISTORY  Family History   Problem Relation Age of Onset    Diabetes Father     Diabetes Paternal Grandfather     Cancer Paternal Grandfather         LUNG    Heart Disease Maternal Grandmother     Psychiatric Disorder Paternal Grandmother     Cancer Paternal Grandmother         LUNG     CURRENT MEDICATIONS  Current Outpatient Medications   Medication Sig Dispense Refill    DULoxetine (Cymbalta) 60 mg capsule       clonazePAM (KlonoPIN) 1 mg tablet TAKE 1 TABLET BY MOUTH TWICE A DAY AS NEEDED      lamoTRIgine (LaMICtal) 200 mg tablet TAKE 1 TABLET BY MOUTH EVERY DAY      multivitamin (ONE A DAY) tablet Take 1 Tab by mouth daily.  ibuprofen (ADVIL) 200 mg tablet Take  by mouth.        Current Facility-Administered Medications   Medication Dose Route Frequency Provider Last Rate Last Admin    midazolam (VERSED) 1 mg/mL injection             lidocaine (PF) (XYLOCAINE) 10 mg/mL (1 %) injection             0.9% sodium chloride infusion 500 mL  500 mL IntraVENous CONTINUOUS Edita Castillo MD        midazolam (VERSED) injection 0.5-2 mg  0.5-2 mg IntraVENous Rad Zana Castillo MD        fentaNYL citrate (PF) injection 100 mcg  100 mcg IntraVENous Zana Castillo MD       [facility] lidocaine (XYLOCAINE) 10 mg/mL (1 %) injection 10 mL  10 mL SubCUTAneous RAD ONCE Eleazar Reyes MD         ALLERGIES  No Known Allergies    DIAGNOSTIC STUDIES   IMAGING STUDIES  I personally reviewed the following imaging studies:    US Results (most recent):  Results from Hospital Encounter encounter on 05/27/22    US ABD COMP    Narrative  EXAM: US ABD COMP    INDICATION: Elevated LFTs. COMPARISON: Abdomen MRI 3/29/2022. Abdomen US, LTD 3/11/2022. TECHNIQUE:  Real-time sonography of the abdomen was performed with multiple static images of  the liver, gallbladder, pancreas, spleen, kidneys and retroperitoneum obtained. FINDINGS:  LIVER:  The liver is normal in echotexture with no mass or other focal abnormality. LIVER VASCULATURE:  The portal vein flow is antegrade. GALLBLADDER:  The gallbladder again shows appearance of adenomyomatosis. There is no  cholelithiasis, inflammatory wall thickening or fluid around the gallbladder. COMMON BILE DUCT:  There is no intrahepatic biliary duct dilatation. The stable common duct  measures 7 mm in diameter. PANCREAS:  The visualized pancreas shows no significant finding or change. SPLEEN:  The spleen is normal in echotexture and 8.9 and measures cm in length. RIGHT KIDNEY:  The right kidney demonstrates normal echogenicity with no mass, stone or  hydronephrosis. The right kidney measures 11.2 cm in length. LEFT KIDNEY:  The left kidney demonstrates normal echogenicity with no mass, stone or  hydronephrosis. The left kidney measures 12.7 cm in length. RETROPERITONEUM:  The aorta tapers normally. The IVC is normal.    There is no ascites. Impression  1. Gallbladder adenomyomatosis appearance without stones or inflammatory wall  thickening. 2. Borderline large CBD. 3. Unremarkable liver.     LABS  Lab Results   Component Value Date/Time    WBC 4.6 05/27/2022 07:16 AM    HGB 13.7 05/27/2022 07:16 AM    HCT 43.1 05/27/2022 07:16 AM    PLATELET 233 05/27/2022 07:16 AM    MCV 98.4 05/27/2022 07:16 AM     Lab Results   Component Value Date/Time    Sodium 136 05/19/2022 07:36 AM    Potassium 4.5 05/19/2022 07:36 AM    Chloride 104 05/19/2022 07:36 AM    CO2 29 05/19/2022 07:36 AM    Anion gap 3 (L) 05/19/2022 07:36 AM    Glucose 78 05/19/2022 07:36 AM    BUN 7 05/19/2022 07:36 AM    Creatinine 0.72 05/19/2022 07:36 AM    BUN/Creatinine ratio 10 (L) 05/19/2022 07:36 AM    GFR est AA >60 05/19/2022 07:36 AM    GFR est non-AA >60 05/19/2022 07:36 AM    Calcium 9.6 05/19/2022 07:36 AM     Lab Results   Component Value Date/Time    INR 1.2 (H) 05/27/2022 07:16 AM    Prothrombin time 12.2 (H) 05/27/2022 07:16 AM       PHYSICAL EXAM   /72 (BP 1 Location: Right arm, BP Patient Position: At rest)   Pulse 68   Temp 98.7 °F (37.1 °C)   Resp 16   Ht 5' 5\" (1.651 m)   Wt 61.2 kg (135 lb)   SpO2 100%   BMI 22.47 kg/m²   General:  NAD  Heart:  RRR  Lungs:  NWOB  Neurological:  AAOX3      PLAN   Procedure to be performed:  US guided core liver biopsy  Plan for sedation:  moderate  Post procedure plan:  observation per protocol  Informed consent:  risks, benefits, and alternatives reviewed with the patient / family who agree to proceed      Tory Zapata NP  [facility]

## 2022-06-20 DIAGNOSIS — B17.9 HEPATITIS, ACUTE: ICD-10-CM

## 2022-06-20 DIAGNOSIS — R74.8 ELEVATED LIVER ENZYMES: Primary | ICD-10-CM

## 2022-06-20 NOTE — PROGRESS NOTES
Pathology reports active hepatitis. Pathologist are performing test for Binh-Barr virus infection on the sample.   Please asked patient to make appointment in a couple of weeks, very important

## 2022-07-08 ENCOUNTER — PATIENT MESSAGE (OUTPATIENT)
Dept: HEMATOLOGY | Age: 42
End: 2022-07-08

## 2022-07-08 ENCOUNTER — OFFICE VISIT (OUTPATIENT)
Dept: HEMATOLOGY | Age: 42
End: 2022-07-08
Payer: COMMERCIAL

## 2022-07-08 VITALS
TEMPERATURE: 97.2 F | OXYGEN SATURATION: 100 % | BODY MASS INDEX: 23.13 KG/M2 | HEART RATE: 93 BPM | HEIGHT: 65 IN | WEIGHT: 138.8 LBS | DIASTOLIC BLOOD PRESSURE: 68 MMHG | SYSTOLIC BLOOD PRESSURE: 104 MMHG

## 2022-07-08 DIAGNOSIS — K75.4 AUTOIMMUNE HEPATITIS (HCC): ICD-10-CM

## 2022-07-08 DIAGNOSIS — R74.8 ELEVATED LIVER ENZYMES: Primary | ICD-10-CM

## 2022-07-08 PROBLEM — F31.9 BIPOLAR DEPRESSION (HCC): Status: ACTIVE | Noted: 2022-07-08

## 2022-07-08 PROBLEM — F41.1 GENERALIZED ANXIETY DISORDER: Status: ACTIVE | Noted: 2022-07-08

## 2022-07-08 PROCEDURE — 99205 OFFICE O/P NEW HI 60 MIN: CPT | Performed by: NURSE PRACTITIONER

## 2022-07-08 RX ORDER — PREDNISONE 20 MG/1
60 TABLET ORAL
Qty: 90 TABLET | Refills: 1 | Status: SHIPPED | OUTPATIENT
Start: 2022-07-08 | End: 2022-08-19

## 2022-07-08 NOTE — PROGRESS NOTES
Identified pt with two pt identifiers(name and ). Reviewed record in preparation for visit and have obtained necessary documentation. Chief Complaint   Patient presents with    Follow-up      Vitals:    22 1105   BP: 104/68   Pulse: 93   Temp: 97.2 °F (36.2 °C)   TempSrc: Temporal   Weight: 138 lb 12.8 oz (63 kg)   Height: 5' 5\" (1.651 m)   PainSc:   0 - No pain       Health Maintenance Review: Patient reminded of \"due or due soon\" health maintenance. I have asked the patient to contact his/her primary care provider (PCP) for follow-up on his/her health maintenance. Coordination of Care Questionnaire:  :   1) Have you been to an emergency room, urgent care, or hospitalized since your last visit? If yes, where when, and reason for visit? no       2. Have seen or consulted any other health care provider since your last visit? If yes, where when, and reason for visit? YES      Patient is accompanied by mother I have received verbal consent from Cecilia Zhou to discuss any/all medical information while they are present in the room.

## 2022-07-08 NOTE — PROGRESS NOTES
[address] [facility], [place], [address], [place] [facility], [place]      DARRICK Duffy, [facility]-BC     April CYRUS Barrera, [facility]   NANCY Sarmiento, [facility]       Jona Abduluta Marck [address]    at [facility]    [address], [address] Liudmila Nice  22.    [phone]    FAX: [address] [place] [address], [address]    [phone]    FAX: [phone]       Patient Care Team:  Shahbaz Jolly MD as PCP - General (Internal Medicine Physician)  Shahbaz Jolly MD as PCP - [facility]  Tayla Toure MD (Psychiatry)      Problem List  Date Reviewed: 6/9/2022          Codes Class Noted    Autoimmune hepatitis [facility]) ICD-10-CM: K75.4  ICD-9-CM: 571.42  7/8/2022        Bipolar depression ([facility] ICD-10-CM: F31.9  ICD-9-CM: 296.50  7/8/2022        Generalized anxiety disorder ICD-10-CM: F41.1  ICD-9-CM: 300.02  7/8/2022        Adenomyomatosis of gallbladder ICD-10-CM: D13.5  ICD-9-CM: 211.5  6/6/2022        Smoking ICD-10-CM: F17.200  ICD-9-CM: 305.1  5/13/2019            The clinicians listed above have asked me to see Nemesio Cox in consultation regarding elevated liver enzymes and its management. All medical records sent by the referring physicians were reviewed including imaging studies. The patient is a 35 y.o.  female who was found to have elevated  liver transaminases and   alkaline phosphatase in 2/2022 during a routine physcial.     Serologic evaluation for markers of chronic liver disease was positive for ANGEL. EBV IGG positive, EBV IGM negative. Negative for HCV, HBV, AMA, Ceruloplasmin. Ultrasound of the liver was performed in 5/2022.   The results of the imaging demonstrated a normal appearing liver.      A liver biopsy was performed in 6/2022. This demonstrated stage 3 bridging fibrosis. Marked inflammation with necrosis. Hepatitis with periportal and lobular inflammation. The patient had not started any new medications within 3 months preceding the elevation in liver chemistries. Of note, she was diagnosed with Covid 2/2022. A rash developed from the chest to head both anteriorly and posteriorly which has worsened over time. The patient has the following symptoms which could be attributed to the liver disorder:  Itching, rash, weakness, poor appetite and fatigue. The patient is not experiencing the following symptoms which are commonly seen in this liver disorder: Pain in the right side over the liver, yellowing of the eyes or skin, swelling of the abdomen, or swelling of the lower extremities. The patient completes all daily activities without any functional limitations. ASSESSMENT AND PLAN:  Elevated liver enzymes/AIH   Persistent elevation in liver transaminases and alkaline phosphatase of unclear etiology at this time. Laboratory testing performed prior to today's visit 5/2022 reviewed in detail. The liver function in normal. The platelet count is normal.     Will perform laboratory testing to monitor liver function and degree of liver injury. This included   BMP, hepatic panel, CBC with platelet count, and INR. Serologic testing for causes of chronic liver disease were ordered. Expedited review of liver biopsy slides from 6/2022 conducted by Dr. Adolfo Solis. Severe inflammation in portal tracts. Piecemeal necrosis. No steatosis. Bridging Fibrosis. Consistent with severe autoimmune hepatitis. The patient was started on prednisone 60 mg daily. Will decrease to 40 mg in 2 weeks. Will evaluate response to treatment in 2 weeks. Discussed side effects of prednisone in detail.      Screening for Hepatocellular Carcinoma  HCC screening is not necessary if the patient has no evidence of cirrhosis. Treatment of other medical problems in patients with chronic liver disease  There are no contraindications for the patient to take most medications that are necessary for treatment of other medical issues. The patient can take any medications utilized for treatment of DM. Statins to treat hypercholesterolemia    Normal doses of acetaminophen, as recommended on the label of the bottle, are not hepatotoxic except in the setting of daily alcohol use, even in patients with cirrhosis and can be utilized for pain. Counseling for alcohol in patients with chronic liver disease  The patient does not drink. Vaccinations   Vaccination for viral hepatitis B is recommended since the patient has no serologic evidence of previous exposure or vaccination with immunity. Vaccination for viral hepatitis A is not needed. The patient has serologic evidence of prior exposure or vaccination with immunity. The patient had COVID-19 infection and recovered. Routine vaccinations against other bacterial and viral agents can be performed as indicated. Annual flu vaccination should be administered if indicated. ALLERGIES  No Known Allergies    MEDICATIONS  Current Outpatient Medications   Medication Sig    predniSONE (DELTASONE) 20 mg tablet Take 3 Tablets by mouth daily (with breakfast).  DULoxetine (Cymbalta) 60 mg capsule     clonazePAM (KlonoPIN) 1 mg tablet TAKE 1 TABLET BY MOUTH TWICE A DAY AS NEEDED    lamoTRIgine (LaMICtal) 200 mg tablet TAKE 1 TABLET BY MOUTH EVERY DAY    multivitamin (ONE A DAY) tablet Take 1 Tab by mouth daily.  ibuprofen (ADVIL) 200 mg tablet Take  by mouth. No current facility-administered medications for this visit. SYSTEM REVIEW NOT RELATED TO LIVER DISEASE OR REVIEWED ABOVE:  Constitution systems: Negative for fever, chills, weight gain, weight loss. Eyes: Negative for visual changes.   ENT: Negative for sore throat, painful swallowing. Respiratory: Negative for cough, hemoptysis, SOB. Cardiology: Negative for chest pain, palpitations. GI:  Negative for constipation or diarrhea. : Negative for urinary frequency, dysuria, hematuria, nocturia. Skin: Negative for rash. Hematology: Negative for easy bruising, blood clots. Musculo-skelatal: Negative for back pain, muscle pain, weakness. Neurologic: Negative for headaches, dizziness, vertigo, memory problems not related to HE. Psychology: Negative for anxiety, depression. FAMILY HISTORY:  The father  of MVA, history of DM. The mother diagnosed with autoimmune hepatitis not treated. Maternal grandmother cholangiocarcinoma. The following family members have liver disease: Mother   There is no family history of immune disorders. SOCIAL HISTORY:  The patient is . The patient has 2 children. The patient currently smokes 3-5 cigarettes daily. The patient does not drink. The patient does not work. PHYSICAL EXAMINATION:  Visit Vitals  /68 (BP 1 Location: Right arm, BP Patient Position: Sitting, BP Cuff Size: Adult long)   Pulse 93   Temp 97.2 °F (36.2 °C) (Temporal)   Ht 5' 5\" (1.651 m)   Wt 138 lb 12.8 oz (63 kg)   SpO2 100%   BMI 23.10 kg/m²     General: No acute distress. Eyes: Sclera anicteric. ENT: No oral lesions. Thyroid normal.  Nodes: No adenopathy. Skin: No spider angiomata. No jaundice. No palmar erythema. Respiratory: Lungs clear to auscultation. Cardiovascular: Regular heart rate. No murmurs. No JVD. Abdomen: Soft non-tender. Liver slightly enlarged 1 finger BCM. Spleen not palpable. No obvious ascites. Extremities: No edema. No muscle wasting. No gross arthritic changes. Neurologic: Alert and oriented. Cranial nerves grossly intact. No asterixis.     LABORATORY STUDIES:  Liver [place] of [address] & Units 2022   WBC 3.4 - 10.8 x10E3/uL 5.8   ANC 1.4 - 7.0 x10E3/uL 3.0   HGB 11.1 - 15.9 g/dL 13.1    - 450 x10E3/uL 189   INR 0.9 - 1.2 1.1   AST 0 - 40 IU/L 392 (H)   ALT 0 - 32 IU/L 331 (H)   Alk Phos 44 - 121 IU/L 133 (H)   Bili, Total 0.0 - 1.2 mg/dL 1.2   Bili, Direct 0.00 - 0.40 mg/dL 0.58 (H)   Albumin 3.8 - 4.8 g/dL 3.8   BUN 6 - 24 mg/dL 7   Creat 0.57 - 1.00 mg/dL 0.62   Na 134 - 144 mmol/L 136   K 3.5 - 5.2 mmol/L 4.3   Cl 96 - 106 mmol/L 104   CO2 20 - 29 mmol/L 23   Glucose 65 - 99 mg/dL 71     Liver [place] [address] Ref Rng & Units 5/19/2022 4/5/2022   AST 0 - 40 IU/L 580 (H) 311 (H)   ALT 0 - 32 IU/L 735 (H) 438 (H)   Alk Phos 44 - 121 IU/L 147 (H) 125 (H)   Bili, Total 0.0 - 1.2 mg/dL 1.2 (H) 0.6   Bili, Direct 0.00 - 0.40 mg/dL  0.3 (H)   Albumin 3.8 - 4.8 g/dL 3.5 3.2 (L)   BUN 6 - 24 mg/dL 7    Creat 0.57 - 1.00 mg/dL 0.72    Na 134 - 144 mmol/L 136    K 3.5 - 5.2 mmol/L 4.5    Cl 96 - 106 mmol/L 104    CO2 20 - 29 mmol/L 29    Glucose 65 - 99 mg/dL 78      SEROLOGIES:  Serologies Latest Ref Rng & Units 4/5/2022   Ferritin 15 - 150 ng/mL 93     Serologies Latest Ref Rng & Units 4/5/2022   M2 Ab 0.0 - 20.0 Units <20.0   LKM 0.0 - 20.0 Units 1.1   Ceruloplasmin 19.0 - 39.0 mg/dL 23.4     Serologies Latest Ref Rng & Units 3/1/2022   Hep C Ab NONREACTIVE   NONREACTIVE     Serologies Latest Ref Rng & Units 7/8/2022   Hep A Ab, Total Negative Positive (A)   Hep B Surface Ag Negative Negative     Serologies Latest Ref Rng & Units 7/8/2022   Hep B Core Ab, Total Negative Negative   Hep B Surface AB QL  Non Reactive     Serologies Latest Ref Rng & Units 7/8/2022   Ferritin 15 - 150 ng/mL 443 (H)   Iron % Saturation 15 - 55 % 61 (H)   ASMCA 0 - 19 Units 93 (H)   M2 Ab 0.0 - 20.0 Units <20.0   LKM 0.0 - 20.0 Units    Ceruloplasmin 19.0 - 39.0 mg/dL 19.9   Alpha-1 antitrypsin level 101 - 187 mg/dL 195 (H)     LIVER HISTOLOGY:  6/2022. Liver Biopsy reviewed by MLS. Severe inflammation. Moderate piecemeal necrosis. No steatosis. Bridging fibrosis. Knodell score 15 (6,3,3,3). Mahendra 4. Metavir 3. Consistent with severe AIH.     ENDOSCOPIC PROCEDURES:  Not available or performed    RADIOLOGY:  Not available or performed    OTHER TESTING:  Not available or performed    FOLLOW-UP:  All of the issues listed above in the Assessment and Plan were discussed with the patient. All questions were answered. The patient expressed a clear understanding of the above. [address] [address] in 2 weeks to evaluate response to treatment. April MELLISA Barrera, [facility] of [address] [facility] [address] [id number] Chidi Lindquist, [address] [place] [address] 22.  201 [facility]

## 2022-07-09 LAB
A1AT SERPL-MCNC: 195 MG/DL (ref 101–187)
ALBUMIN SERPL-MCNC: 3.8 G/DL (ref 3.8–4.8)
ALP SERPL-CCNC: 133 IU/L (ref 44–121)
ALT SERPL-CCNC: 331 IU/L (ref 0–32)
AST SERPL-CCNC: 392 IU/L (ref 0–40)
BASOPHILS # BLD AUTO: 0 X10E3/UL (ref 0–0.2)
BASOPHILS NFR BLD AUTO: 0 %
BILIRUB DIRECT SERPL-MCNC: 0.58 MG/DL (ref 0–0.4)
BILIRUB SERPL-MCNC: 1.2 MG/DL (ref 0–1.2)
BUN SERPL-MCNC: 7 MG/DL (ref 6–24)
BUN/CREAT SERPL: 11 (ref 9–23)
CALCIUM SERPL-MCNC: 9.2 MG/DL (ref 8.7–10.2)
CERULOPLASMIN SERPL-MCNC: 19.9 MG/DL (ref 19–39)
CHLORIDE SERPL-SCNC: 104 MMOL/L (ref 96–106)
CO2 SERPL-SCNC: 23 MMOL/L (ref 20–29)
CREAT SERPL-MCNC: 0.62 MG/DL (ref 0.57–1)
EGFR: 113 ML/MIN/1.73
EOSINOPHIL # BLD AUTO: 0 X10E3/UL (ref 0–0.4)
EOSINOPHIL NFR BLD AUTO: 0 %
ERYTHROCYTE [DISTWIDTH] IN BLOOD BY AUTOMATED COUNT: 14.5 % (ref 11.7–15.4)
FERRITIN SERPL-MCNC: 443 NG/ML (ref 15–150)
GLUCOSE SERPL-MCNC: 71 MG/DL (ref 65–99)
HAV AB SER QL IA: POSITIVE
HBV CORE AB SERPL QL IA: NEGATIVE
HBV SURFACE AB SER QL: NON REACTIVE
HBV SURFACE AG SERPL QL IA: NEGATIVE
HCT VFR BLD AUTO: 39.1 % (ref 34–46.6)
HGB BLD-MCNC: 13.1 G/DL (ref 11.1–15.9)
IMM GRANULOCYTES # BLD AUTO: 0 X10E3/UL (ref 0–0.1)
IMM GRANULOCYTES NFR BLD AUTO: 0 %
INR PPP: 1.1 (ref 0.9–1.2)
IRON SATN MFR SERPL: 61 % (ref 15–55)
IRON SERPL-MCNC: 186 UG/DL (ref 27–159)
LYMPHOCYTES # BLD AUTO: 2.3 X10E3/UL (ref 0.7–3.1)
LYMPHOCYTES NFR BLD AUTO: 39 %
MCH RBC QN AUTO: 30.8 PG (ref 26.6–33)
MCHC RBC AUTO-ENTMCNC: 33.5 G/DL (ref 31.5–35.7)
MCV RBC AUTO: 92 FL (ref 79–97)
MONOCYTES # BLD AUTO: 0.6 X10E3/UL (ref 0.1–0.9)
MONOCYTES NFR BLD AUTO: 10 %
NEUTROPHILS # BLD AUTO: 3 X10E3/UL (ref 1.4–7)
NEUTROPHILS NFR BLD AUTO: 51 %
PLATELET # BLD AUTO: 189 X10E3/UL (ref 150–450)
POTASSIUM SERPL-SCNC: 4.3 MMOL/L (ref 3.5–5.2)
PROT SERPL-MCNC: 9.1 G/DL (ref 6–8.5)
PROTHROMBIN TIME: 11.2 SEC (ref 9.1–12)
RBC # BLD AUTO: 4.25 X10E6/UL (ref 3.77–5.28)
SODIUM SERPL-SCNC: 136 MMOL/L (ref 134–144)
TIBC SERPL-MCNC: 303 UG/DL (ref 250–450)
UIBC SERPL-MCNC: 117 UG/DL (ref 131–425)
WBC # BLD AUTO: 5.8 X10E3/UL (ref 3.4–10.8)

## 2022-07-10 PROBLEM — K75.4 AUTOIMMUNE HEPATITIS (HCC): Status: ACTIVE | Noted: 2022-07-08

## 2022-07-10 LAB
IGG4 SER-MCNC: 23 MG/DL (ref 2–96)
MITOCHONDRIA M2 IGG SER-ACNC: <20 UNITS (ref 0–20)
SMA IGG SER-ACNC: 93 UNITS (ref 0–19)

## 2022-07-12 ENCOUNTER — OFFICE VISIT (OUTPATIENT)
Dept: PRIMARY CARE CLINIC | Age: 42
End: 2022-07-12
Payer: COMMERCIAL

## 2022-07-12 VITALS
RESPIRATION RATE: 16 BRPM | OXYGEN SATURATION: 98 % | SYSTOLIC BLOOD PRESSURE: 111 MMHG | HEART RATE: 83 BPM | BODY MASS INDEX: 23.76 KG/M2 | WEIGHT: 142.6 LBS | HEIGHT: 65 IN | DIASTOLIC BLOOD PRESSURE: 76 MMHG | TEMPERATURE: 97.1 F

## 2022-07-12 DIAGNOSIS — I78.1 SPIDER NEVUS: Primary | ICD-10-CM

## 2022-07-12 DIAGNOSIS — R74.8 ELEVATED LIVER ENZYMES: ICD-10-CM

## 2022-07-12 PROCEDURE — 99213 OFFICE O/P EST LOW 20 MIN: CPT | Performed by: INTERNAL MEDICINE

## 2022-07-12 NOTE — PROGRESS NOTES
Braulio Galaviz is a 35 y.o.  female and presents with     Chief Complaint   Patient presents with    Rash     one month follow up        Pt is here for follow up. Pt was recently seen by Liver specialist and was started on prednisone for possible auto imunne hepatitis. Pt fells slightly tired. Pt had labs done and liver enzymes had already started improving  Pt has rash on chest wall. Past Medical History:   Diagnosis Date    ADD (attention deficit disorder)     Adenomyomatosis of gallbladder 6/6/2022    Anxiety     Depression     bipolar     Past Surgical History:   Procedure Laterality Date    ENDOMETRIAL CRYOABLATION  2003    HX GYN      Endometrial Ablation    HX TUBAL LIGATION  2003     Current Outpatient Medications   Medication Sig    predniSONE (DELTASONE) 20 mg tablet Take 3 Tablets by mouth daily (with breakfast).  DULoxetine (Cymbalta) 60 mg capsule     clonazePAM (KlonoPIN) 1 mg tablet TAKE 1 TABLET BY MOUTH TWICE A DAY AS NEEDED    lamoTRIgine (LaMICtal) 200 mg tablet TAKE 1 TABLET BY MOUTH EVERY DAY    multivitamin (ONE A DAY) tablet Take 1 Tab by mouth daily.  ibuprofen (ADVIL) 200 mg tablet Take  by mouth. No current facility-administered medications for this visit.      Health Maintenance   Topic Date Due    Cervical cancer screen  Never done    DTaP/Tdap/Td series (1 - Tdap) 02/06/2030 (Originally 11/25/2020)    Flu Vaccine (1) 09/01/2022    Pneumococcal 0-64 years (2 - PCV) 02/23/2023    Depression Monitoring  06/06/2023    Lipid Screen  02/24/2027    COVID-19 Vaccine  Completed    Hepatitis C Screening  Discontinued     Immunization History   Administered Date(s) Administered    COVID-19, MODERNA BLUE border, Primary or Immunocompromised, (age 18y+), IM, 100 mcg/0.5mL 05/20/2021, 06/17/2021    COVID-19, MODERNA Booster BLUE border, (age 18y+), IM, 50mcg/0.25mL 02/16/2022    Pneumococcal Polysaccharide (PPSV-23) 02/23/2022    Td, Adsorbed PF 11/24/2020 Patient's last menstrual period was 07/01/2022. Allergies and Intolerances:   No Known Allergies    Family History:   Family History   Problem Relation Age of Onset    Diabetes Father     Diabetes Paternal Grandfather     Cancer Paternal Grandfather         LUNG    Heart Disease Maternal Grandmother     Psychiatric Disorder Paternal Grandmother     Cancer Paternal Grandmother         LUNG       Social History:   She  reports that she has been smoking. She started smoking about 32 years ago. She has a 16.00 pack-year smoking history. She has never used smokeless tobacco.  She  reports current alcohol use.             Review of Systems:   General: negative for - chills, fatigue, fever, weight change  Psych: negative for - anxiety, depression, irritability or mood swings  ENT: negative for - headaches, hearing change, nasal congestion, oral lesions, sneezing or sore throat  Heme/ Lymph: negative for - bleeding problems, bruising, pallor or swollen lymph nodes  Endo: negative for - hot flashes, polydipsia/polyuria or temperature intolerance  Resp: negative for - cough, shortness of breath or wheezing  CV: negative for - chest pain, edema or palpitations  GI: negative for - abdominal pain, change in bowel habits, constipation, diarrhea or nausea/vomiting  : negative for - dysuria, hematuria, incontinence, pelvic pain or vulvar/vaginal symptoms  MSK: negative for - joint pain, joint swelling or muscle pain  Neuro: negative for - confusion, headaches, seizures or weakness  Derm: negative for - dry skin, hair changes, rash or skin lesion changes          Physical:   Vitals:   Vitals:    07/12/22 0825   BP: 111/76   Pulse: 83   Resp: 16   Temp: 97.1 °F (36.2 °C)   TempSrc: Temporal   SpO2: 98%   Weight: 142 lb 9.6 oz (64.7 kg)   Height: 5' 5\" (1.651 m)           Exam:   HEENT- atraumatic,normocephalic, awake, oriented, well nourished  Neck - supple,no enlarged lymph nodes, no JVD, no thyromegaly  Chest- CTA, no rhonchi, no crackles  Heart- rrr, no murmurs / gallop/rub  Abdomen- soft,BS+,NT, no hepatosplenomegaly  Ext - no c/c/edema   Neuro- no focal deficits. Power 5/5 all extremities  Skin - warm,dry, no obvious rashes. , spider nevi on upper anterior chest wall          Review of Data:   LABS:   Lab Results   Component Value Date/Time    WBC 5.8 07/08/2022 12:27 PM    HGB 13.1 07/08/2022 12:27 PM    HCT 39.1 07/08/2022 12:27 PM    PLATELET 294 18/58/0347 12:27 PM     Lab Results   Component Value Date/Time    Sodium 136 07/08/2022 12:27 PM    Potassium 4.3 07/08/2022 12:27 PM    Chloride 104 07/08/2022 12:27 PM    CO2 23 07/08/2022 12:27 PM    Glucose 71 07/08/2022 12:27 PM    BUN 7 07/08/2022 12:27 PM    Creatinine 0.62 07/08/2022 12:27 PM     Lab Results   Component Value Date/Time    Cholesterol, total 198 02/24/2022 08:10 AM    HDL Cholesterol 63 02/24/2022 08:10 AM    LDL, calculated 116.8 (H) 02/24/2022 08:10 AM    Triglyceride 91 02/24/2022 08:10 AM     No components found for: GPT        Impression / Plan:        ICD-10-CM ICD-9-CM    1. Spider nevus  I78.1 448.1    2. Elevated liver enzymes  R74.8 790.5      Patient currently being treated for autoimmune hepatitis. Informed patient that spider nevi secondary to underlying liver disease. Underlying disease needs to be treated. Currently on prednisone. May need DEXA, mnitor liver function, blood sugars , vitamin D levels, cholesterol. avpid alcohol  And tylenol/    Explained to patient risk benefits of the medications. Advised patient to stop meds if having any side effects. Pt verbalized understanding of the instructions. I have discussed the diagnosis with the patient and the intended plan as seen in the above orders. The patient has received an after-visit summary and questions were answered concerning future plans. I have discussed medication side effects and warnings with the patient as well.  I have reviewed the plan of care with the patient, accepted their input and they are in agreement with the treatment goals. Reviewed plan of care. Patient has provided input and agrees with goals. Follow-up and Dispositions    · Return in about 3 months (around 10/12/2022).          Nilton Richmond MD

## 2022-07-12 NOTE — PROGRESS NOTES
Chief Complaint   Patient presents with    Rash     one month follow up         Visit Vitals  /76 (BP 1 Location: Left upper arm, BP Patient Position: Sitting)   Pulse 83   Temp 97.1 °F (36.2 °C) (Temporal)   Resp 16   Ht 5' 5\" (1.651 m)   Wt 142 lb 9.6 oz (64.7 kg)   LMP 07/01/2022   SpO2 98%   BMI 23.73 kg/m²        Health Maintenance Due   Topic    Cervical cancer screen         1. Have you been to the ER, urgent care clinic since your last visit? Hospitalized since your last visit? No    2. Have you seen or consulted any other health care providers outside of the [address] [place] [address] since your last visit? Include any pap smears or colon screening.  No

## 2022-07-13 LAB
C-ANCA TITR SER IF: ABNORMAL TITER
P-ANCA ATYPICAL TITR SER IF: ABNORMAL TITER
P-ANCA TITR SER IF: ABNORMAL TITER

## 2022-07-15 NOTE — PROGRESS NOTES
Finding are consistent with AIH. Patient started on prednisone 60 mg and will decrease by 20 every 2 weeks. Will see her back for follow up in 2 weeks.

## 2022-07-20 LAB — ANA SER QL IF: NEGATIVE

## 2022-07-22 ENCOUNTER — OFFICE VISIT (OUTPATIENT)
Dept: HEMATOLOGY | Age: 42
End: 2022-07-22
Payer: COMMERCIAL

## 2022-07-22 VITALS
TEMPERATURE: 96.7 F | BODY MASS INDEX: 24.16 KG/M2 | SYSTOLIC BLOOD PRESSURE: 107 MMHG | DIASTOLIC BLOOD PRESSURE: 75 MMHG | HEART RATE: 85 BPM | WEIGHT: 145 LBS | RESPIRATION RATE: 16 BRPM | OXYGEN SATURATION: 100 % | HEIGHT: 65 IN

## 2022-07-22 DIAGNOSIS — K75.4 AUTOIMMUNE HEPATITIS (HCC): Primary | ICD-10-CM

## 2022-07-22 LAB
ALBUMIN SERPL-MCNC: 3.1 G/DL (ref 3.5–5)
ALBUMIN/GLOB SERPL: 0.6 {RATIO} (ref 1.1–2.2)
ALP SERPL-CCNC: 78 U/L (ref 45–117)
ALT SERPL-CCNC: 67 U/L (ref 12–78)
ANION GAP SERPL CALC-SCNC: 3 MMOL/L (ref 5–15)
AST SERPL-CCNC: 24 U/L (ref 15–37)
BASOPHILS # BLD: 0 K/UL (ref 0–0.1)
BASOPHILS NFR BLD: 0 % (ref 0–1)
BILIRUB DIRECT SERPL-MCNC: 0.3 MG/DL (ref 0–0.2)
BILIRUB SERPL-MCNC: 0.7 MG/DL (ref 0.2–1)
BUN SERPL-MCNC: 8 MG/DL (ref 6–20)
BUN/CREAT SERPL: 12 (ref 12–20)
CALCIUM SERPL-MCNC: 8.9 MG/DL (ref 8.5–10.1)
CHLORIDE SERPL-SCNC: 104 MMOL/L (ref 97–108)
CO2 SERPL-SCNC: 30 MMOL/L (ref 21–32)
CREAT SERPL-MCNC: 0.68 MG/DL (ref 0.55–1.02)
DIFFERENTIAL METHOD BLD: ABNORMAL
EOSINOPHIL # BLD: 0.1 K/UL (ref 0–0.4)
EOSINOPHIL NFR BLD: 1 % (ref 0–7)
ERYTHROCYTE [DISTWIDTH] IN BLOOD BY AUTOMATED COUNT: 15.9 % (ref 11.5–14.5)
GLOBULIN SER CALC-MCNC: 4.9 G/DL (ref 2–4)
GLUCOSE SERPL-MCNC: 78 MG/DL (ref 65–100)
HCT VFR BLD AUTO: 42.2 % (ref 35–47)
HGB BLD-MCNC: 13.9 G/DL (ref 11.5–16)
IMM GRANULOCYTES # BLD AUTO: 0 K/UL
IMM GRANULOCYTES NFR BLD AUTO: 0 %
LYMPHOCYTES # BLD: 4.9 K/UL (ref 0.8–3.5)
LYMPHOCYTES NFR BLD: 34 % (ref 12–49)
MCH RBC QN AUTO: 31.7 PG (ref 26–34)
MCHC RBC AUTO-ENTMCNC: 32.9 G/DL (ref 30–36.5)
MCV RBC AUTO: 96.3 FL (ref 80–99)
MONOCYTES # BLD: 0.4 K/UL (ref 0–1)
MONOCYTES NFR BLD: 3 % (ref 5–13)
NEUTS SEG # BLD: 9.1 K/UL (ref 1.8–8)
NEUTS SEG NFR BLD: 62 % (ref 32–75)
NRBC # BLD: 0 K/UL (ref 0–0.01)
NRBC BLD-RTO: 0 PER 100 WBC
PLATELET # BLD AUTO: 286 K/UL (ref 150–400)
PMV BLD AUTO: 9.8 FL (ref 8.9–12.9)
POTASSIUM SERPL-SCNC: 4.8 MMOL/L (ref 3.5–5.1)
PROT SERPL-MCNC: 8 G/DL (ref 6.4–8.2)
RBC # BLD AUTO: 4.38 M/UL (ref 3.8–5.2)
RBC MORPH BLD: ABNORMAL
RBC MORPH BLD: ABNORMAL
SODIUM SERPL-SCNC: 137 MMOL/L (ref 136–145)
WBC # BLD AUTO: 14.5 K/UL (ref 3.6–11)

## 2022-07-22 PROCEDURE — 99214 OFFICE O/P EST MOD 30 MIN: CPT | Performed by: NURSE PRACTITIONER

## 2022-07-22 NOTE — PROGRESS NOTES
Identified pt with two pt identifiers(name and ). Reviewed record in preparation for visit and have obtained necessary documentation. No chief complaint on file. There were no vitals filed for this visit. Health Maintenance Review: Patient reminded of \"due or due soon\" health maintenance. I have asked the patient to contact his/her primary care provider (PCP) for follow-up on his/her health maintenance. Coordination of Care Questionnaire:  :   1) Have you been to an emergency room, urgent care, or hospitalized since your last visit? If yes, where when, and reason for visit? no       2. Have seen or consulted any other health care provider since your last visit? If yes, where when, and reason for visit? YES, follow up, PCP      Patient is accompanied by mother I have received verbal consent from Cecilia Zhou to discuss any/all medical information while they are present in the room.

## 2022-07-22 NOTE — PROGRESS NOTES
[address] [facility], MD, [address], [place], [place]      DARRICK Wagoner Che, [facility]     Lakesha Barrera, [facility]   DANIEL Ferreira-KRYSTIN Gunn, [facility]       Jona Souza Marck De [address] 136    at [address], [address] [place] [address]    1400 [facility] 22.    [phone]    FAX: [address] [place] [address]    at [address], [address]    [phone]    FAX: [phone]       Patient Care Team:  Sonido Kiser MD as PCP - General (Internal Medicine Physician)  Sonido Kiser MD as PCP - [facility] [place] Provider  Jayla Castro MD (Psychiatry)      Problem List  Date Reviewed: 7/12/2022            Codes Class Noted    Autoimmune hepatitis [facility]) ICD-10-CM: K75.4  ICD-9-CM: 571.42  7/8/2022        Bipolar depression ([facility] [place] [address] ICD-10-CM: F31.9  ICD-9-CM: 296.50  7/8/2022        Generalized anxiety disorder ICD-10-CM: F41.1  ICD-9-CM: 300.02  7/8/2022        Adenomyomatosis of gallbladder ICD-10-CM: D13.5  ICD-9-CM: 211.5  6/6/2022        Smoking ICD-10-CM: F17.200  ICD-9-CM: 305.1  5/13/2019           [facility] for management of Autoimmune Hepatitis. The active problem list, all pertinent past medical history, medications, liver histology, radiologic findings   and laboratory findings related to the liver disorder were reviewed and discussed with the patient. The patient is a 35 y.o.  female who was found to have elevated  liver transaminases and   alkaline phosphatase in 2/2022 during a routine physcial.     Serologic evaluation for markers of chronic liver disease was strongly positive for ASMA, ANCA. Ultrasound of the liver was performed in 5/2022.   The results of the imaging demonstrated a normal appearing liver. A liver biopsy was performed in 6/2022. Dr. Erika King reviewed the biopsy slides which demonstrated severe inflammation, moderate piecemeal necrosis. No steatosis. Bridging fibrosis. Consistent with severe AIH. The patient was started on treatment for AIH with prednisone 60 mg daily on 7/08/2022. She is tolerating the treatment well and notes an improvement in the previously mentioned rash. Of note, she was diagnosed with Covid 2/2022. A rash developed from the chest to head both anteriorly and posteriorly which has worsened over time. The patient has the following symptoms which could be attributed to the liver disorder:  Itching, rash, weakness, poor appetite and fatigue. The patient is not experiencing the following symptoms which are commonly seen in this liver disorder: Pain in the right side over the liver, yellowing of the eyes or skin, swelling of the abdomen, or swelling of the lower extremities. The patient completes all daily activities without any functional limitations. ASSESSMENT AND PLAN:  Autoimmune Hepatitis   The diagnosis was based upon laboratory studies, serology, and liver biopsy. A liver biopsy performed in 6/2022 shows severe inflammation with moderate piecemeal necrosis   and Stage 3 bridging fibrosis. The patient was started on treatment with Prednisone 60 mg daily. She is tolerating the treatment well and returns for testing to evaluate response to treatment. Have performed laboratory testing to monitor liver function and degree of liver injury. This included BMP, hepatic panel, CBC with platelet count and INR. Laboratory testing from 7/08/2022 reviewed in detail. Follow-up testing ordered today. Liver transaminases are elevated. ALP is elevated.   Liver function is normal.  The platelet count is   normal.      Will decrease prednisone to 40 mg daily x 2 weeks then 30 mg daily for 2 weeks and see her back in 4 weeks to evaluate ongoing response to treatment. Once she reaches 20 mg and the liver enzymes are normal will start either azathioprine or cellcept. Screening for Hepatocellular Carcinoma  HCC screening is not necessary if the patient has no evidence of cirrhosis. Treatment of other medical problems in patients with chronic liver disease  There are no contraindications for the patient to take most medications that are necessary for treatment of other medical issues. The patient can take any medications utilized for treatment of DM. Statins to treat hypercholesterolemia    Normal doses of acetaminophen, as recommended on the label of the bottle, are not hepatotoxic except in the setting of daily alcohol use, even in patients with cirrhosis and can be utilized for pain. Counseling for alcohol in patients with chronic liver disease  The patient does not drink. Vaccinations   Vaccination for viral hepatitis B is recommended since the patient has no serologic evidence of previous exposure or vaccination with immunity. Vaccination for viral hepatitis A is not needed. The patient has serologic evidence of prior exposure or vaccination with immunity. The patient had COVID-19 infection and recovered. Routine vaccinations against other bacterial and viral agents can be performed as indicated. Annual flu vaccination should be administered if indicated. ALLERGIES  No Known Allergies    MEDICATIONS  Current Outpatient Medications   Medication Sig    predniSONE (DELTASONE) 20 mg tablet Take 3 Tablets by mouth daily (with breakfast). DULoxetine (CYMBALTA) 60 mg capsule Take 60 mg by mouth in the morning. clonazePAM (KlonoPIN) 1 mg tablet TAKE 1 TABLET BY MOUTH TWICE A DAY AS NEEDED    lamoTRIgine (LaMICtal) 200 mg tablet TAKE 1 TABLET BY MOUTH EVERY DAY    multivitamin (ONE A DAY) tablet Take 1 Tab by mouth daily. ibuprofen (MOTRIN) 200 mg tablet Take  by mouth.      No current facility-administered medications for this visit. SYSTEM REVIEW NOT RELATED TO LIVER DISEASE OR REVIEWED ABOVE:  Constitution systems: Negative for fever, chills, weight gain, weight loss. Eyes: Negative for visual changes. ENT: Negative for sore throat, painful swallowing. Respiratory: Negative for cough, hemoptysis, SOB. Cardiology: Negative for chest pain, palpitations. GI:  Negative for constipation or diarrhea. : Negative for urinary frequency, dysuria, hematuria, nocturia. Skin: Negative for rash. Hematology: Negative for easy bruising, blood clots. Musculo-skelatal: Negative for back pain, muscle pain, weakness. Neurologic: Negative for headaches, dizziness, vertigo, memory problems not related to HE. Psychology: Negative for anxiety, depression. FAMILY HISTORY:  The father  of MVA, history of DM. The mother diagnosed with autoimmune hepatitis not treated. Maternal grandmother cholangiocarcinoma. The following family members have liver disease: Mother   There is no family history of immune disorders. SOCIAL HISTORY:  The patient is . The patient has 2 children. The patient currently smokes 3-5 cigarettes daily. The patient does not drink. The patient does not work. PHYSICAL EXAMINATION:  Visit Vitals  /75 (BP 1 Location: Left upper arm, BP Patient Position: Sitting, BP Cuff Size: Adult)   Pulse 85   Temp (!) 96.7 °F (35.9 °C) (Temporal)   Resp 16   Ht 5' 5\" (1.651 m)   Wt 145 lb (65.8 kg)   LMP 2022   SpO2 100%   BMI 24.13 kg/m²       General: No acute distress. Eyes: Sclera anicteric. ENT: No oral lesions. Thyroid normal.  Nodes: No adenopathy. Skin: No spider angiomata. No jaundice. No palmar erythema. Respiratory: Lungs clear to auscultation. Cardiovascular: Regular heart rate. No murmurs. No JVD. Abdomen: Soft non-tender, liver size normal to percussion/palpation. Spleen not palpable.  No obvious ascites. Extremities: No edema. No muscle wasting. No gross arthritic changes. Neurologic: Alert and oriented. Cranial nerves grossly intact. No asterixis. LABORATORY STUDIES:  Liver [place] [address] & Units 7/22/2022 7/8/2022   WBC 3.6 - 11.0 K/uL 14.5 (H) 5.8   ANC 1.8 - 8.0 K/UL 9.1 (H) 3.0   HGB 11.5 - 16.0 g/dL 13.9 13.1    - 400 K/uL 286 189   INR 0.9 - 1.2  1.1   AST 15 - 37 U/L 24 392 (H)   ALT 12 - 78 U/L 67 331 (H)   Alk Phos 45 - 117 U/L 78 133 (H)   Bili, Total 0.2 - 1.0 MG/DL 0.7 1.2   Bili, Direct 0.0 - 0.2 MG/DL 0.3 (H) 0.58 (H)   Albumin 3.5 - 5.0 g/dL 3.1 (L) 3.8   BUN 6 - 20 MG/DL 8 7   Creat 0.55 - 1.02 MG/DL 0.68 0.62   Na 136 - 145 mmol/L 137 136   K 3.5 - 5.1 mmol/L 4.8 4.3   Cl 97 - 108 mmol/L 104 104   CO2 21 - 32 mmol/L 30 23   Glucose 65 - 100 mg/dL 78 71     Liver [place] [facility] Latest Ref Rng & Units 5/19/2022   AST 15 - 37 U/L 580 (H)   ALT 12 - 78 U/L 735 (H)   Alk Phos 45 - 117 U/L 147 (H)   Bili, Total 0.2 - 1.0 MG/DL 1.2 (H)   Bili, Direct 0.0 - 0.2 MG/DL    Albumin 3.5 - 5.0 g/dL 3.5   BUN 6 - 20 MG/DL 7   Creat 0.55 - 1.02 MG/DL 0.72   Na 136 - 145 mmol/L 136   K 3.5 - 5.1 mmol/L 4.5   Cl 97 - 108 mmol/L 104   CO2 21 - 32 mmol/L 29   Glucose 65 - 100 mg/dL 78     Laboratory testing from 7/08/2022 reviewed in detail. Additional testing included to evaluate progression or regression of disease. Laboratory testing results from today will be communicated by My Chart.      SEROLOGIES:  Serologies Latest Ref Rng & Units 4/5/2022   Ferritin 15 - 150 ng/mL 93     Serologies Latest Ref Rng & Units 4/5/2022   M2 Ab 0.0 - 20.0 Units <20.0   LKM 0.0 - 20.0 Units 1.1   Ceruloplasmin 19.0 - 39.0 mg/dL 23.4     Serologies Latest Ref Rng & Units 3/1/2022   Hep C Ab NONREACTIVE   NONREACTIVE     Serologies Latest Ref Rng & Units 7/8/2022   Hep A Ab, Total Negative Positive (A)   Hep B Surface Ag Negative Negative     Serologies Latest Ref Rng & Units 7/8/2022   Hep B Core Ab, Total Negative Negative   Hep B Surface AB QL  Non Reactive     Serologies Latest Ref Rng & Units 7/8/2022   Ferritin 15 - 150 ng/mL 443 (H)   Iron % Saturation 15 - 55 % 61 (H)   ANGEL, IFA  Negative   C-ANCA Neg:<1:20 titer <1:20   P-ANCA Neg:<1:20 titer <1:20   ANCA Neg:<1:20 titer 1:640 (H)   ASMCA 0 - 19 Units 93 (H)   M2 Ab 0.0 - 20.0 Units <20.0   LKM 0.0 - 20.0 Units    Ceruloplasmin 19.0 - 39.0 mg/dL 19.9   Alpha-1 antitrypsin level 101 - 187 mg/dL 195 (H)     LIVER HISTOLOGY:  6/2022. Liver Biopsy reviewed by MLS. Severe inflammation. Moderate piecemeal necrosis. No steatosis. Bridging fibrosis. Knodell score 15 (6,3,3,3). Mahendra 4. Metavir 3. Consistent with severe AIH.     ENDOSCOPIC PROCEDURES:  Not available or performed    RADIOLOGY:  5/2022. Ultrasound of liver. Normal appearing liver. No liver mass lesions. OTHER TESTING:  Not available or performed    FOLLOW-UP:  All of the issues listed above in the Assessment and Plan were discussed with the patient. All questions were answered. The patient expressed a clear understanding of the above. [address] [address] in 4 weeks to assess the effects and tolerability of prednisone. JOSE SwartzPCNP-BC  [address]  [address] [address] [facility] [address] [id number] Chidi Lindquist, 2000 [facility] 22.  201 [facility]

## 2022-07-25 NOTE — PROGRESS NOTES
Letter sent via my chart regarding the blood work results. The liver enzymes have improved significantly on prednisone.  Will continue taper as discussed

## 2022-07-28 DIAGNOSIS — L98.9 SKIN LESION: Primary | ICD-10-CM

## 2022-08-19 ENCOUNTER — OFFICE VISIT (OUTPATIENT)
Dept: HEMATOLOGY | Age: 42
End: 2022-08-19
Payer: COMMERCIAL

## 2022-08-19 VITALS
WEIGHT: 150 LBS | BODY MASS INDEX: 24.99 KG/M2 | SYSTOLIC BLOOD PRESSURE: 96 MMHG | HEART RATE: 93 BPM | HEIGHT: 65 IN | OXYGEN SATURATION: 96 % | DIASTOLIC BLOOD PRESSURE: 70 MMHG | TEMPERATURE: 96.7 F

## 2022-08-19 DIAGNOSIS — K75.4 AUTOIMMUNE HEPATITIS (HCC): Primary | ICD-10-CM

## 2022-08-19 PROBLEM — F43.10 PTSD (POST-TRAUMATIC STRESS DISORDER): Status: ACTIVE | Noted: 2022-08-19

## 2022-08-19 PROCEDURE — 99214 OFFICE O/P EST MOD 30 MIN: CPT | Performed by: NURSE PRACTITIONER

## 2022-08-19 RX ORDER — PREDNISONE 20 MG/1
20 TABLET ORAL
Qty: 30 TABLET | Refills: 1 | Status: SHIPPED | OUTPATIENT
Start: 2022-08-19 | End: 2022-09-16 | Stop reason: DRUGHIGH

## 2022-08-19 RX ORDER — PREDNISONE 20 MG/1
TABLET ORAL
COMMUNITY
End: 2022-08-19 | Stop reason: SDUPTHER

## 2022-08-19 NOTE — PROGRESS NOTES
Identified pt with two pt identifiers(name and ). Reviewed record in preparation for visit and have obtained necessary documentation. No chief complaint on file. Vitals:    22 0851   BP: 96/70   Pulse: 93   Temp: (!) 96.7 °F (35.9 °C)   TempSrc: Temporal   SpO2: 96%   Weight: 150 lb (68 kg)   Height: 5' 5\" (1.651 m)   PainSc:   0 - No pain       Health Maintenance Review: Patient reminded of \"due or due soon\" health maintenance. I have asked the patient to contact his/her primary care provider (PCP) for follow-up on his/her health maintenance. Coordination of Care Questionnaire:  :   1) Have you been to an emergency room, urgent care, or hospitalized since your last visit? If yes, where when, and reason for visit? no       2. Have seen or consulted any other health care provider since your last visit? If yes, where when, and reason for visit? NO      Patient is accompanied by mother I have received verbal consent from Cecilia Zhou to discuss any/all medical information while they are present in the room.

## 2022-08-19 NOTE — PROGRESS NOTES
[address] [place] [address], MD, Olayinka Torrez, Abner Stanley, [place]      DARRICK [facility]-BC     Lakesha Barrera, [facility]   DANIEL Mchugh-KRYSTIN Dow, [facility]       Jona Deputado Marck De Tovar 136    at [address], [address] [place] [address], [facility] 22.    [phone]    FAX: [address] [place] [address]    at [address], [address]    [phone]    FAX: [phone]       Patient Care Team:  Renae Valdovinos MD as PCP - General (Internal Medicine Physician)  Renae Valdovinos MD as PCP - [facility] Empaneled Provider  Blade Davidson MD (Psychiatry)      Problem List  Date Reviewed: 7/22/2022            Codes Class Noted    PTSD (post-traumatic stress disorder) ICD-10-CM: F43.10  ICD-9-CM: 309.81  8/19/2022        [facility] ICD-10-CM: K75.4  ICD-9-CM: 571.42  7/8/2022        Bipolar depression ([facility] ICD-10-CM: F31.9  ICD-9-CM: 296.50  7/8/2022        Generalized anxiety disorder ICD-10-CM: F41.1  ICD-9-CM: 300.02  7/8/2022        Adenomyomatosis of gallbladder ICD-10-CM: D13.5  ICD-9-CM: 211.5  6/6/2022        Smoking ICD-10-CM: F17.200  ICD-9-CM: 305.1  5/13/2019         Sabas Donald [facility] for management of Autoimmune Hepatitis. The active problem list, all pertinent past medical history, medications, liver histology, radiologic findings and laboratory findings related to the liver disorder were reviewed and discussed with the patient. The patient is a 35 y.o.   female who was found to have elevated  liver transaminases and   alkaline phosphatase in 2/2022 during a routine physcial.     Serologic evaluation for markers of chronic liver disease was strongly positive for ASMA, ANCA.    Ultrasound of the liver was performed in 5/2022. The results of the imaging demonstrated a normal appearing liver. A liver biopsy was performed in 6/2022. Dr. Glenda Corley reviewed the biopsy slides which demonstrated severe inflammation, moderate piecemeal necrosis. No steatosis. Bridging fibrosis. Consistent with severe AIH. The patient was started on treatment for AIH with prednisone 60 mg daily on 7/08/2022. The prednisone has been tapered and is now at 30 mg daily. She is tolerating the treatment with no reported side effects. The rash has not improved and may be related to Cymbalta or Lamictal.     Of note, she was diagnosed with Covid 2/2022. A rash developed from the chest to head both anteriorly and posteriorly which has worsened over time. The patient has the following symptoms which could be attributed to the liver disorder:  Itching, rash, weakness, poor appetite and fatigue. The patient is not experiencing the following symptoms which are commonly seen in this liver disorder: Pain in the right side over the liver, yellowing of the eyes or skin, swelling of the abdomen, or swelling of the lower extremities. The patient completes all daily activities without any functional limitations. ASSESSMENT AND PLAN:  Autoimmune Hepatitis   The diagnosis was based upon laboratory studies, serology, and liver biopsy. A liver biopsy performed in 6/2022 shows severe inflammation with moderate piecemeal necrosis   and Stage 3 bridging fibrosis. The patient was started on treatment with Prednisone 60 mg daily. She is tolerating the treatment well and returns for testing to evaluate response to treatment. Have performed laboratory testing to monitor liver function and degree of liver injury. This included BMP, hepatic panel, and CBC with platelet count. Laboratory testing from 7/22/2022 reviewed in detail. Follow-up testing ordered today.     The AST is normal. The ALT is elevated. The ALP is normal. The liver function is normal. The platelet count is normal.     Treatment for AIH was started 7/08/2022 with prednisone 60 mg daily. She is currently on 30 mg daily. Will decrease to 20 mg daily. Once the liver enzymes normalize will start Cellcept or Azathioprine for maintenance medication. Screening for Hepatocellular Carcinoma  HCC screening is not necessary if the patient has no evidence of cirrhosis. Treatment of other medical problems in patients with chronic liver disease  There are no contraindications for the patient to take most medications that are necessary for treatment of other medical issues. The patient can take any medications utilized for treatment of DM. Statins to treat hypercholesterolemia    Normal doses of acetaminophen, as recommended on the label of the bottle, are not hepatotoxic except in the setting of daily alcohol use, even in patients with cirrhosis and can be utilized for pain. Counseling for alcohol in patients with chronic liver disease  The patient does not drink. Vaccinations   Vaccination for viral hepatitis B is recommended since the patient has no serologic evidence of previous exposure or vaccination with immunity. Vaccination for viral hepatitis A is not needed. The patient has serologic evidence of prior exposure or vaccination with immunity. The patient had COVID-19 infection and recovered. Routine vaccinations against other bacterial and viral agents can be performed as indicated. Annual flu vaccination should be administered if indicated. ALLERGIES  No Known Allergies    MEDICATIONS  Current Outpatient Medications   Medication Sig    predniSONE (DELTASONE) 20 mg tablet Take 1 Tablet by mouth daily (with breakfast). DULoxetine (CYMBALTA) 60 mg capsule Take 60 mg by mouth in the morning.     clonazePAM (KlonoPIN) 1 mg tablet TAKE 1 TABLET BY MOUTH TWICE A DAY AS NEEDED    lamoTRIgine (LaMICtal) 200 mg tablet 100 mg.    multivitamin (ONE A DAY) tablet Take 1 Tab by mouth daily. ibuprofen (MOTRIN) 200 mg tablet Take  by mouth. No current facility-administered medications for this visit. SYSTEM REVIEW NOT RELATED TO LIVER DISEASE OR REVIEWED ABOVE:  Constitution systems: Negative for fever, chills, weight gain, weight loss. Eyes: Negative for visual changes. ENT: Negative for sore throat, painful swallowing. Respiratory: Negative for cough, hemoptysis, SOB. Cardiology: Negative for chest pain, palpitations. GI:  Negative for constipation or diarrhea. : Negative for urinary frequency, dysuria, hematuria, nocturia. Skin: Negative for rash. Hematology: Negative for easy bruising, blood clots. Musculo-skeletal: Negative for back pain, muscle pain, weakness. Neurologic: Negative for headaches, dizziness, vertigo, memory problems not related to HE. Psychology: Negative for anxiety, depression. FAMILY HISTORY:  The father  of MVA, history of DM. The mother diagnosed with autoimmune hepatitis not treated. Maternal grandmother cholangiocarcinoma. The following family members have liver disease: Mother   There is no family history of immune disorders. SOCIAL HISTORY:  The patient is . The patient has 2 children. The patient currently smokes 3-5 cigarettes daily. The patient does not drink. The patient does not work. PHYSICAL EXAMINATION:  Visit Vitals  BP 96/70 (BP 1 Location: Left upper arm, BP Patient Position: Sitting, BP Cuff Size: Adult)   Pulse 93   Temp (!) 96.7 °F (35.9 °C) (Temporal)   Ht 5' 5\" (1.651 m)   Wt 150 lb (68 kg)   SpO2 96%   BMI 24.96 kg/m²       General: No acute distress. Eyes: Sclera anicteric. ENT: No oral lesions. Thyroid normal.  Nodes: No adenopathy. Skin: No spider angiomata. No jaundice. No palmar erythema. Respiratory: Lungs clear to auscultation. Cardiovascular: Regular heart rate. No murmurs. No JVD.   Abdomen: Soft non-tender, liver size normal to percussion/palpation. Spleen not palpable. No obvious ascites. Extremities: No edema. No muscle wasting. No gross arthritic changes. Neurologic: Alert and oriented. Cranial nerves grossly intact. No asterixis. LABORATORY STUDIES:  Liver [place] of [address] [place] [address] Units 8/19/2022 7/22/2022 7/8/2022   WBC 3.6 - 11.0 K/uL 10.2 14.5 (H) 5.8   ANC 1.8 - 8.0 K/UL 5.1 9.1 (H) 3.0   HGB 11.5 - 16.0 g/dL 13.7 13.9 13.1    - 400 K/uL 271 286 189   INR 0.9 - 1.2   1.1   AST 15 - 37 U/L 35 24 392 (H)   ALT 12 - 78 U/L 57 67 331 (H)   Alk Phos 45 - 117 U/L 49 78 133 (H)   Bili, Total 0.2 - 1.0 MG/DL 0.4 0.7 1.2   Bili, Direct 0.0 - 0.2 MG/DL 0.2 0.3 (H) 0.58 (H)   Albumin 3.5 - 5.0 g/dL 3.2 (L) 3.1 (L) 3.8   BUN 6 - 20 MG/DL 9 8 7   Creat 0.55 - 1.02 MG/DL 0.68 0.68 0.62   Na 136 - 145 mmol/L 138 137 136   K 3.5 - 5.1 mmol/L 5.0 4.8 4.3   Cl 97 - 108 mmol/L 105 104 104   CO2 21 - 32 mmol/L 31 30 23   Glucose 65 - 100 mg/dL 78 78 71     Laboratory testing from 7/22/2022 reviewed in detail. Additional testing included to evaluate progression or regression of disease. Laboratory testing results from today will be communicated by My Chart.      SEROLOGIES:  Serologies Latest Ref Rng & Units 4/5/2022   Ferritin 15 - 150 ng/mL 93     Serologies Latest Ref Rng & Units 4/5/2022   M2 Ab 0.0 - 20.0 Units <20.0   LKM 0.0 - 20.0 Units 1.1   Ceruloplasmin 19.0 - 39.0 mg/dL 23.4     Serologies Latest Ref Rng & Units 3/1/2022   Hep C Ab NONREACTIVE   NONREACTIVE     Serologies Latest Ref Rng & Units 7/8/2022   Hep A Ab, Total Negative Positive (A)   Hep B Surface Ag Negative Negative     Serologies Latest Ref Rng & Units 7/8/2022   Hep B Core Ab, Total Negative Negative   Hep B Surface AB QL  Non Reactive     Serologies Latest Ref Rng & Units 7/8/2022   Ferritin 15 - 150 ng/mL 443 (H)   Iron % Saturation 15 - 55 % 61 (H)   ANGEL, IFA  Negative   C-ANCA Neg:<1:20 titer <1:20   P-ANCA Neg:<1:20 titer <1:20   ANCA Neg:<1:20 titer 1:640 (H)   ASMCA 0 - 19 Units 93 (H)   M2 Ab 0.0 - 20.0 Units <20.0   LKM 0.0 - 20.0 Units    Ceruloplasmin 19.0 - 39.0 mg/dL 19.9   Alpha-1 antitrypsin level 101 - 187 mg/dL 195 (H)     LIVER HISTOLOGY:  6/2022. Liver Biopsy reviewed by MLS. Severe inflammation. Moderate piecemeal necrosis. No steatosis. Bridging fibrosis. Knodell score 15 (6,3,3,3). Mahendra 4. Metavir 3. Consistent with severe AIH.     ENDOSCOPIC PROCEDURES:  Not available or performed    RADIOLOGY:  5/2022. Ultrasound of liver. Normal appearing liver. No liver mass lesions. OTHER TESTING:  Not available or performed    FOLLOW-UP:  All of the issues listed above in the Assessment and Plan were discussed with the patient. All questions were answered. The patient expressed a clear understanding of the above. [address] [address] in 4 weeks for ongoing treatment of AIH. SAVANNAH [facility] of [address] [address] Chidi Lindquist, 2000 [facility], [facility] 22.  201 [facility]

## 2022-08-20 LAB
ALBUMIN SERPL-MCNC: 3.2 G/DL (ref 3.5–5)
ALBUMIN/GLOB SERPL: 0.8 {RATIO} (ref 1.1–2.2)
ALP SERPL-CCNC: 49 U/L (ref 45–117)
ALT SERPL-CCNC: 57 U/L (ref 12–78)
ANION GAP SERPL CALC-SCNC: 2 MMOL/L (ref 5–15)
AST SERPL-CCNC: 35 U/L (ref 15–37)
BASOPHILS # BLD: 0 K/UL (ref 0–0.1)
BASOPHILS NFR BLD: 0 % (ref 0–1)
BILIRUB DIRECT SERPL-MCNC: 0.2 MG/DL (ref 0–0.2)
BILIRUB SERPL-MCNC: 0.4 MG/DL (ref 0.2–1)
BUN SERPL-MCNC: 9 MG/DL (ref 6–20)
BUN/CREAT SERPL: 13 (ref 12–20)
CALCIUM SERPL-MCNC: 9 MG/DL (ref 8.5–10.1)
CHLORIDE SERPL-SCNC: 105 MMOL/L (ref 97–108)
CO2 SERPL-SCNC: 31 MMOL/L (ref 21–32)
CREAT SERPL-MCNC: 0.68 MG/DL (ref 0.55–1.02)
DIFFERENTIAL METHOD BLD: ABNORMAL
EOSINOPHIL # BLD: 0 K/UL (ref 0–0.4)
EOSINOPHIL NFR BLD: 0 % (ref 0–7)
ERYTHROCYTE [DISTWIDTH] IN BLOOD BY AUTOMATED COUNT: 15 % (ref 11.5–14.5)
GLOBULIN SER CALC-MCNC: 3.8 G/DL (ref 2–4)
GLUCOSE SERPL-MCNC: 78 MG/DL (ref 65–100)
HCT VFR BLD AUTO: 42.3 % (ref 35–47)
HGB BLD-MCNC: 13.7 G/DL (ref 11.5–16)
IMM GRANULOCYTES # BLD AUTO: 0 K/UL (ref 0–0.04)
IMM GRANULOCYTES NFR BLD AUTO: 0 % (ref 0–0.5)
LYMPHOCYTES # BLD: 4.4 K/UL (ref 0.8–3.5)
LYMPHOCYTES NFR BLD: 43 % (ref 12–49)
MCH RBC QN AUTO: 32.4 PG (ref 26–34)
MCHC RBC AUTO-ENTMCNC: 32.4 G/DL (ref 30–36.5)
MCV RBC AUTO: 100 FL (ref 80–99)
MONOCYTES # BLD: 0.7 K/UL (ref 0–1)
MONOCYTES NFR BLD: 7 % (ref 5–13)
NEUTS SEG # BLD: 5.1 K/UL (ref 1.8–8)
NEUTS SEG NFR BLD: 50 % (ref 32–75)
NRBC # BLD: 0 K/UL (ref 0–0.01)
NRBC BLD-RTO: 0 PER 100 WBC
PLATELET # BLD AUTO: 271 K/UL (ref 150–400)
PMV BLD AUTO: 10.6 FL (ref 8.9–12.9)
POTASSIUM SERPL-SCNC: 5 MMOL/L (ref 3.5–5.1)
PROT SERPL-MCNC: 7 G/DL (ref 6.4–8.2)
RBC # BLD AUTO: 4.23 M/UL (ref 3.8–5.2)
SODIUM SERPL-SCNC: 138 MMOL/L (ref 136–145)
WBC # BLD AUTO: 10.2 K/UL (ref 3.6–11)

## 2022-08-23 NOTE — PROGRESS NOTES
Letter sent via my chart regarding the blood work results. The liver numbers continue to improve. The ALT is slightly above 40, all other numbers are normal. Will start maintenance medication at the next office visit.

## 2022-09-16 ENCOUNTER — OFFICE VISIT (OUTPATIENT)
Dept: HEMATOLOGY | Age: 42
End: 2022-09-16
Payer: COMMERCIAL

## 2022-09-16 VITALS
DIASTOLIC BLOOD PRESSURE: 70 MMHG | HEIGHT: 65 IN | OXYGEN SATURATION: 99 % | WEIGHT: 155 LBS | TEMPERATURE: 97.3 F | HEART RATE: 69 BPM | SYSTOLIC BLOOD PRESSURE: 112 MMHG | BODY MASS INDEX: 25.83 KG/M2

## 2022-09-16 DIAGNOSIS — K75.4 AUTOIMMUNE HEPATITIS (HCC): Primary | ICD-10-CM

## 2022-09-16 PROCEDURE — 99214 OFFICE O/P EST MOD 30 MIN: CPT | Performed by: NURSE PRACTITIONER

## 2022-09-16 RX ORDER — MYCOPHENOLATE MOFETIL 500 MG/1
500 TABLET ORAL 2 TIMES DAILY
Qty: 60 TABLET | Refills: 5 | Status: SHIPPED | OUTPATIENT
Start: 2022-09-16

## 2022-09-16 RX ORDER — PREDNISONE 5 MG/1
15 TABLET ORAL DAILY
Qty: 90 TABLET | Refills: 4 | Status: SHIPPED | OUTPATIENT
Start: 2022-09-16

## 2022-09-16 NOTE — PROGRESS NOTES
Identified pt with two pt identifiers(name and ). Reviewed record in preparation for visit and have obtained necessary documentation. Chief Complaint   Patient presents with    Follow-up      Vitals:    22 0936   BP: 112/70   Pulse: 69   Temp: 97.3 °F (36.3 °C)   TempSrc: Temporal   SpO2: 99%   Weight: 155 lb (70.3 kg)   Height: 5' 5\" (1.651 m)   PainSc:   0 - No pain       Health Maintenance Review: Patient reminded of \"due or due soon\" health maintenance. I have asked the patient to contact his/her primary care provider (PCP) for follow-up on his/her health maintenance. Coordination of Care Questionnaire:  :   1) Have you been to an emergency room, urgent care, or hospitalized since your last visit? If yes, where when, and reason for visit? no       2. Have seen or consulted any other health care provider since your last visit? If yes, where when, and reason for visit? NO      Patient is accompanied by mother I have received verbal consent from Cecilia Zhou to discuss any/all medical information while they are present in the room.

## 2022-09-16 NOTE — PROGRESS NOTES
[address] [facility], MD, [address], [place], [place]      DARRICK Boo, [facility]     April S Holly, [facility]   DANIEL Toney-KRYSTIN Roper, [facility]       Jona Souza Marck [address]    at [address], [address] [place] [address], [facility] 22.    [phone]    FAX: [address] [place] [address]    at [address], [address]    [phone]    FAX: [phone]       Patient Care Team:  Jazz Sepulveda MD as PCP - General (Internal Medicine Physician)  Jazz Sepulveda MD as PCP - [facility]  Veronica Weinberg MD (Psychiatry)      Problem List  Date Reviewed: 8/21/2022            Codes Class Noted    PTSD (post-traumatic stress disorder) ICD-10-CM: F43.10  ICD-9-CM: 309.81  8/19/2022        Autoimmune hepatitis ([facility].) ICD-10-CM: K75.4  ICD-9-CM: 571.42  7/8/2022        Bipolar depression ([facility].) ICD-10-CM: F31.9  ICD-9-CM: 296.50  7/8/2022        Generalized anxiety disorder ICD-10-CM: F41.1  ICD-9-CM: 300.02  7/8/2022        Adenomyomatosis of gallbladder ICD-10-CM: D13.5  ICD-9-CM: 211.5  6/6/2022        Smoking ICD-10-CM: F17.200  ICD-9-CM: 305.1  5/13/2019         Franco Rico is being seen at [address] for management of Autoimmune Hepatitis. The active problem list, all pertinent past medical history, medications, liver histology, radiologic findings and laboratory findings related to the liver disorder were reviewed and discussed with the patient. The patient is a 35 y.o.   female who was found to have elevated  liver transaminases and   alkaline phosphatase in 2/2022 during a routine physcial.     Serologic evaluation for markers of chronic liver disease was strongly positive for ASMA, ANCA.    Ultrasound of the liver was performed in 5/2022. The results of the imaging demonstrated a normal appearing liver. A liver biopsy was performed in 6/2022. Dr. Vicki Madison reviewed the biopsy slides which demonstrated severe inflammation, moderate piecemeal necrosis. No steatosis. Bridging fibrosis. Consistent with severe AIH. The patient was started on treatment for AIH with prednisone 60 mg daily on 7/08/2022. The prednisone has been tapered and is now at 20 mg daily. She is tolerating the treatment with no reported side effects. The rash has not improved and may be related to Cymbalta or Lamictal. The liver enzymes have improved and are now all close to normal.     Of note, she was diagnosed with Covid 2/2022. A rash developed from the chest to head both anteriorly and posteriorly which has worsened over time. The patient has the following symptoms which could be attributed to the liver disorder:  Itching, rash, weakness, poor appetite and fatigue. The patient is not experiencing the following symptoms which are commonly seen in this liver disorder: Pain in the right side over the liver, yellowing of the eyes or skin, swelling of the abdomen, or swelling of the lower extremities. The patient completes all daily activities without any functional limitations. ASSESSMENT AND PLAN:  Autoimmune Hepatitis   The diagnosis was based upon laboratory studies, serology, and liver biopsy. A liver biopsy performed in 6/2022 shows severe inflammation with moderate piecemeal necrosis   and Stage 3 bridging fibrosis. Have performed laboratory testing to monitor liver function and degree of liver injury. This included BMP, hepatic panel, CBC with platelet count and INR. Laboratory testing from 8/19/2022 reviewed in detail. Follow-up testing ordered today. The AST is normal. The ALT is slightly elevated above 40.  The ALP is normal. The liver function and platelet count are normal. Treatment for AIH was started 7/08/2022 with prednisone 60 mg daily. She is currently on 20 mg daily. Will start cellcept 500 mg twice daily and decrease prednisone to 15 mg daily. Screening for Hepatocellular Carcinoma  HCC screening is not necessary if the patient has no evidence of cirrhosis. Treatment of other medical problems in patients with chronic liver disease  There are no contraindications for the patient to take most medications that are necessary for treatment of other medical issues. The patient can take any medications utilized for treatment of DM. Statins to treat hypercholesterolemia    Normal doses of acetaminophen, as recommended on the label of the bottle, are not hepatotoxic except in the setting of daily alcohol use, even in patients with cirrhosis and can be utilized for pain. Counseling for alcohol in patients with chronic liver disease  The patient does not drink. Vaccinations   Vaccination for viral hepatitis B is recommended since the patient has no serologic evidence of previous exposure or vaccination with immunity. Vaccination for viral hepatitis A is not needed. The patient has serologic evidence of prior exposure or vaccination with immunity. The patient had COVID-19 infection and recovered. Routine vaccinations against other bacterial and viral agents can be performed as indicated. Annual flu vaccination should be administered if indicated. ALLERGIES  Not on File    MEDICATIONS  Current Outpatient Medications   Medication Sig    predniSONE (DELTASONE) 20 mg tablet Take 1 Tablet by mouth daily (with breakfast). DULoxetine (CYMBALTA) 60 mg capsule Take 60 mg by mouth in the morning. clonazePAM (KlonoPIN) 1 mg tablet TAKE 1 TABLET BY MOUTH TWICE A DAY AS NEEDED    lamoTRIgine (LaMICtal) 200 mg tablet 100 mg.    multivitamin (ONE A DAY) tablet Take 1 Tab by mouth daily. ibuprofen (MOTRIN) 200 mg tablet Take  by mouth.      No current facility-administered medications for this visit. SYSTEM REVIEW NOT RELATED TO LIVER DISEASE OR REVIEWED ABOVE:  Constitution systems: Negative for fever, chills, weight gain, weight loss. Eyes: Negative for visual changes. ENT: Negative for sore throat, painful swallowing. Respiratory: Negative for cough, hemoptysis, SOB. Cardiology: Negative for chest pain, palpitations. GI:  Negative for constipation or diarrhea. : Negative for urinary frequency, dysuria, hematuria, nocturia. Skin: Negative for rash. Hematology: Negative for easy bruising, blood clots. Musculo-skeletal: Negative for back pain, muscle pain, weakness. Neurologic: Negative for headaches, dizziness, vertigo, memory problems not related to HE. Psychology: Negative for anxiety, depression. FAMILY HISTORY:  The father  of MVA, history of DM. The mother diagnosed with autoimmune hepatitis not treated. Maternal grandmother cholangiocarcinoma. The following family members have liver disease: Mother   There is no family history of immune disorders. SOCIAL HISTORY:  The patient is . The patient has 2 children. The patient currently smokes 3-5 cigarettes daily. The patient does not drink. The patient does not work. PHYSICAL EXAMINATION:  Visit Vitals  /70 (BP 1 Location: Left upper arm, BP Patient Position: Sitting, BP Cuff Size: Adult)   Pulse 69   Temp 97.3 °F (36.3 °C) (Temporal)   Ht 5' 5\" (1.651 m)   Wt 155 lb (70.3 kg)   SpO2 99%   BMI 25.79 kg/m²       General: No acute distress. Eyes: Sclera anicteric. ENT: No oral lesions. Thyroid normal.  Nodes: No adenopathy. Skin: No spider angiomata. No jaundice. No palmar erythema. Respiratory: Lungs clear to auscultation. Cardiovascular: Regular heart rate. No murmurs. No JVD. Abdomen: Soft non-tender, liver size normal to percussion/palpation. Spleen not palpable. No obvious ascites. Extremities: No edema. No muscle wasting. No gross arthritic changes. Neurologic: Alert and oriented. Cranial nerves grossly intact. No asterixis. LABORATORY STUDIES:  [place] [address] & Units 9/16/2022 8/19/2022   WBC 3.6 - 11.0 K/uL 9.7 10.2   ANC 1.8 - 8.0 K/UL 4.8 5.1   HGB 11.5 - 16.0 g/dL 13.6 13.7    - 400 K/uL 302 271   INR 0.9 - 1.2     AST 15 - 37 U/L 30 35   ALT 12 - 78 U/L 52 57   Alk Phos 45 - 117 U/L 48 49   Bili, Total 0.2 - 1.0 MG/DL 0.4 0.4   Bili, Direct 0.0 - 0.2 MG/DL 0.1 0.2   Albumin 3.5 - 5.0 g/dL 3.7 3.2 (L)   BUN 6 - 20 MG/DL 7 9   Creat 0.55 - 1.02 MG/DL 0.71 0.68   Na 136 - 145 mmol/L 140 138   K 3.5 - 5.1 mmol/L 3.7 5.0   Cl 97 - 108 mmol/L 105 105   CO2 21 - 32 mmol/L 25 31   Glucose 65 - 100 mg/dL 77 78     [facility] Latest Ref Rng & Units 7/22/2022   WBC 3.6 - 11.0 K/uL 14.5 (H)   ANC 1.8 - 8.0 K/UL 9.1 (H)   HGB 11.5 - 16.0 g/dL 13.9    - 400 K/uL 286   INR 0.9 - 1.2    AST 15 - 37 U/L 24   ALT 12 - 78 U/L 67   Alk Phos 45 - 117 U/L 78   Bili, Total 0.2 - 1.0 MG/DL 0.7   Bili, Direct 0.0 - 0.2 MG/DL 0.3 (H)   Albumin 3.5 - 5.0 g/dL 3.1 (L)   BUN 6 - 20 MG/DL 8   Creat 0.55 - 1.02 MG/DL 0.68   Na 136 - 145 mmol/L 137   K 3.5 - 5.1 mmol/L 4.8   Cl 97 - 108 mmol/L 104   CO2 21 - 32 mmol/L 30   Glucose 65 - 100 mg/dL 78     Laboratory testing from 8/19/2022 reviewed in detail. Additional testing included to evaluate progression or regression of disease. Laboratory testing results from today will be communicated by My Chart.      SEROLOGIES:  [facility] Latest Ref Rng & Units 4/5/2022   Ferritin 15 - 150 ng/mL 93     Serologies Latest Ref Rng & Units 4/5/2022   M2 Ab 0.0 - 20.0 Units <20.0   LKM 0.0 - 20.0 Units 1.1   Ceruloplasmin 19.0 - 39.0 mg/dL 23.4     [facility]ologies Latest Ref Rng & Units 3/1/2022   Hep C Ab NONREACTIVE   NONREACTIVE     Serologies Latest Ref Rng & Units 7/8/2022   Hep A Ab, Total Negative Positive (A)   Hep B Surface Ag Negative Negative Serologies Latest Ref Rng & Units 7/8/2022   Hep B Core Ab, Total Negative Negative   Hep B Surface AB QL  Non Reactive     Serologies Latest Ref Rng & Units 7/8/2022   Ferritin 15 - 150 ng/mL 443 (H)   Iron % Saturation 15 - 55 % 61 (H)   ANGEL, IFA  Negative   C-ANCA Neg:<1:20 titer <1:20   P-ANCA Neg:<1:20 titer <1:20   ANCA Neg:<1:20 titer 1:640 (H)   ASMCA 0 - 19 Units 93 (H)   M2 Ab 0.0 - 20.0 Units <20.0   LKM 0.0 - 20.0 Units    Ceruloplasmin 19.0 - 39.0 mg/dL 19.9   Alpha-1 antitrypsin level 101 - 187 mg/dL 195 (H)     LIVER HISTOLOGY:  6/2022. Liver Biopsy reviewed by MLS. Severe inflammation. Moderate piecemeal necrosis. No steatosis. Bridging fibrosis. Knodell score 15 (6,3,3,3). Mahendra 4. Metavir 3. Consistent with severe AIH.     ENDOSCOPIC PROCEDURES:  Not available or performed    RADIOLOGY:  5/2022. Ultrasound of liver. Normal appearing liver. No liver mass lesions. OTHER TESTING:  Not available or performed    FOLLOW-UP:  All of the issues listed above in the Assessment and Plan were discussed with the patient. All questions were answered. The patient expressed a clear understanding of the above. [facility] 87 in 4 weeks for ongoing monitoring and treatment. April SMoose Barrera, [facility], [facility] of [address] [id number] [facility] [address] [address], [address] [facility] Liudmila Lindquist  22.  [address] [facility]

## 2022-09-17 LAB
ALBUMIN SERPL-MCNC: 3.7 G/DL (ref 3.5–5)
ALBUMIN/GLOB SERPL: 1 {RATIO} (ref 1.1–2.2)
ALP SERPL-CCNC: 48 U/L (ref 45–117)
ALT SERPL-CCNC: 52 U/L (ref 12–78)
ANION GAP SERPL CALC-SCNC: 10 MMOL/L (ref 5–15)
AST SERPL-CCNC: 30 U/L (ref 15–37)
BASOPHILS # BLD: 0 K/UL (ref 0–0.1)
BASOPHILS NFR BLD: 0 % (ref 0–1)
BILIRUB DIRECT SERPL-MCNC: 0.1 MG/DL (ref 0–0.2)
BILIRUB SERPL-MCNC: 0.4 MG/DL (ref 0.2–1)
BUN SERPL-MCNC: 7 MG/DL (ref 6–20)
BUN/CREAT SERPL: 10 (ref 12–20)
CALCIUM SERPL-MCNC: 8.9 MG/DL (ref 8.5–10.1)
CHLORIDE SERPL-SCNC: 105 MMOL/L (ref 97–108)
CO2 SERPL-SCNC: 25 MMOL/L (ref 21–32)
CREAT SERPL-MCNC: 0.71 MG/DL (ref 0.55–1.02)
DIFFERENTIAL METHOD BLD: ABNORMAL
EOSINOPHIL # BLD: 0 K/UL (ref 0–0.4)
EOSINOPHIL NFR BLD: 0 % (ref 0–7)
ERYTHROCYTE [DISTWIDTH] IN BLOOD BY AUTOMATED COUNT: 13.4 % (ref 11.5–14.5)
GLOBULIN SER CALC-MCNC: 3.7 G/DL (ref 2–4)
GLUCOSE SERPL-MCNC: 77 MG/DL (ref 65–100)
HCT VFR BLD AUTO: 41.4 % (ref 35–47)
HGB BLD-MCNC: 13.6 G/DL (ref 11.5–16)
IMM GRANULOCYTES # BLD AUTO: 0 K/UL (ref 0–0.04)
IMM GRANULOCYTES NFR BLD AUTO: 0 % (ref 0–0.5)
LYMPHOCYTES # BLD: 4.2 K/UL (ref 0.8–3.5)
LYMPHOCYTES NFR BLD: 43 % (ref 12–49)
MCH RBC QN AUTO: 32.2 PG (ref 26–34)
MCHC RBC AUTO-ENTMCNC: 32.9 G/DL (ref 30–36.5)
MCV RBC AUTO: 98.1 FL (ref 80–99)
MONOCYTES # BLD: 0.6 K/UL (ref 0–1)
MONOCYTES NFR BLD: 7 % (ref 5–13)
NEUTS SEG # BLD: 4.8 K/UL (ref 1.8–8)
NEUTS SEG NFR BLD: 50 % (ref 32–75)
NRBC # BLD: 0 K/UL (ref 0–0.01)
NRBC BLD-RTO: 0 PER 100 WBC
PLATELET # BLD AUTO: 302 K/UL (ref 150–400)
PMV BLD AUTO: 10.3 FL (ref 8.9–12.9)
POTASSIUM SERPL-SCNC: 3.7 MMOL/L (ref 3.5–5.1)
PROT SERPL-MCNC: 7.4 G/DL (ref 6.4–8.2)
RBC # BLD AUTO: 4.22 M/UL (ref 3.8–5.2)
SODIUM SERPL-SCNC: 140 MMOL/L (ref 136–145)
WBC # BLD AUTO: 9.7 K/UL (ref 3.6–11)

## 2022-09-21 NOTE — PROGRESS NOTES
Letter sent regarding the blood work results which have improved.  Will start cellcept and decrease prednisone as discussed

## 2022-10-14 ENCOUNTER — OFFICE VISIT (OUTPATIENT)
Dept: HEMATOLOGY | Age: 42
End: 2022-10-14
Payer: COMMERCIAL

## 2022-10-14 VITALS
DIASTOLIC BLOOD PRESSURE: 73 MMHG | TEMPERATURE: 96.6 F | HEIGHT: 65 IN | HEART RATE: 95 BPM | BODY MASS INDEX: 26.16 KG/M2 | WEIGHT: 157 LBS | SYSTOLIC BLOOD PRESSURE: 107 MMHG | OXYGEN SATURATION: 98 %

## 2022-10-14 DIAGNOSIS — K75.4 AUTOIMMUNE HEPATITIS (HCC): Primary | ICD-10-CM

## 2022-10-14 LAB
ALBUMIN SERPL-MCNC: 3.6 G/DL (ref 3.5–5)
ALBUMIN/GLOB SERPL: 1 {RATIO} (ref 1.1–2.2)
ALP SERPL-CCNC: 43 U/L (ref 45–117)
ALT SERPL-CCNC: 46 U/L (ref 12–78)
ANION GAP SERPL CALC-SCNC: 4 MMOL/L (ref 5–15)
AST SERPL-CCNC: 25 U/L (ref 15–37)
BASOPHILS # BLD: 0 K/UL (ref 0–0.1)
BASOPHILS NFR BLD: 0 % (ref 0–1)
BILIRUB DIRECT SERPL-MCNC: <0.1 MG/DL (ref 0–0.2)
BILIRUB SERPL-MCNC: 0.3 MG/DL (ref 0.2–1)
BUN SERPL-MCNC: 7 MG/DL (ref 6–20)
BUN/CREAT SERPL: 11 (ref 12–20)
CALCIUM SERPL-MCNC: 9.2 MG/DL (ref 8.5–10.1)
CHLORIDE SERPL-SCNC: 106 MMOL/L (ref 97–108)
CO2 SERPL-SCNC: 27 MMOL/L (ref 21–32)
CREAT SERPL-MCNC: 0.65 MG/DL (ref 0.55–1.02)
DIFFERENTIAL METHOD BLD: ABNORMAL
EOSINOPHIL # BLD: 0 K/UL (ref 0–0.4)
EOSINOPHIL NFR BLD: 0 % (ref 0–7)
ERYTHROCYTE [DISTWIDTH] IN BLOOD BY AUTOMATED COUNT: 12.8 % (ref 11.5–14.5)
GLOBULIN SER CALC-MCNC: 3.7 G/DL (ref 2–4)
GLUCOSE SERPL-MCNC: 101 MG/DL (ref 65–100)
HCT VFR BLD AUTO: 40.9 % (ref 35–47)
HGB BLD-MCNC: 13.6 G/DL (ref 11.5–16)
IMM GRANULOCYTES # BLD AUTO: 0 K/UL (ref 0–0.04)
IMM GRANULOCYTES NFR BLD AUTO: 0 % (ref 0–0.5)
LYMPHOCYTES # BLD: 4 K/UL (ref 0.8–3.5)
LYMPHOCYTES NFR BLD: 44 % (ref 12–49)
MCH RBC QN AUTO: 32.4 PG (ref 26–34)
MCHC RBC AUTO-ENTMCNC: 33.3 G/DL (ref 30–36.5)
MCV RBC AUTO: 97.4 FL (ref 80–99)
MONOCYTES # BLD: 0.7 K/UL (ref 0–1)
MONOCYTES NFR BLD: 7 % (ref 5–13)
NEUTS SEG # BLD: 4.3 K/UL (ref 1.8–8)
NEUTS SEG NFR BLD: 49 % (ref 32–75)
NRBC # BLD: 0 K/UL (ref 0–0.01)
NRBC BLD-RTO: 0 PER 100 WBC
PLATELET # BLD AUTO: 278 K/UL (ref 150–400)
PMV BLD AUTO: 9.8 FL (ref 8.9–12.9)
POTASSIUM SERPL-SCNC: 3.9 MMOL/L (ref 3.5–5.1)
PROT SERPL-MCNC: 7.3 G/DL (ref 6.4–8.2)
RBC # BLD AUTO: 4.2 M/UL (ref 3.8–5.2)
SODIUM SERPL-SCNC: 137 MMOL/L (ref 136–145)
WBC # BLD AUTO: 9 K/UL (ref 3.6–11)

## 2022-10-14 PROCEDURE — 99214 OFFICE O/P EST MOD 30 MIN: CPT | Performed by: NURSE PRACTITIONER

## 2022-10-14 NOTE — PROGRESS NOTES
Identified pt with two pt identifiers(name and ). Reviewed record in preparation for visit and have obtained necessary documentation. Chief Complaint   Patient presents with    Follow-up      Vitals:    10/14/22 0852   BP: 107/73   Pulse: 95   Temp: (!) 96.6 °F (35.9 °C)   TempSrc: Temporal   SpO2: 98%   Weight: 157 lb (71.2 kg)   Height: 5' 5\" (1.651 m)   PainSc:   0 - No pain       Health Maintenance Review: Patient reminded of \"due or due soon\" health maintenance. I have asked the patient to contact his/her primary care provider (PCP) for follow-up on his/her health maintenance. Coordination of Care Questionnaire:  :   1) Have you been to an emergency room, urgent care, or hospitalized since your last visit? If yes, where when, and reason for visit? no       2. Have seen or consulted any other health care provider since your last visit? If yes, where when, and reason for visit? NO      Patient is accompanied by mother I have received verbal consent from Cecilia Zhou to discuss any/all medical information while they are present in the room.

## 2022-10-14 NOTE — PROGRESS NOTES
[address] \[facility] [place] [facility]\"", MD, Mike, [facility], [place]      DRARICK Tirado, ACNP-BC     April S Holly, AGPCNP-BC   GATITO SinghP-KRYSTIN Mills, [facility] PhoenixNP-BC       Jona Deputado [address]    at [address] [place], [address]    [phone]    FAX: [address] [place] [address] Avenue    [address], [address]    [phone]    FAX: [phone]       Patient Care Team:  Miguel A Chung MD as PCP - General (Internal Medicine Physician)  Miguel A Chung MD as PCP - [facility]  Toshia Vegas MD (Psychiatry)      Problem List  Date Reviewed: 10/16/2022            Codes Class Noted    PTSD (post-traumatic stress disorder) ICD-10-CM: F43.10  ICD-9-CM: 309.81  8/19/2022        Autoimmune hepatitis ([facility] ICD-10-CM: K75.4  ICD-9-CM: 571.42  7/8/2022        Bipolar depression ([facility] ICD-10-CM: F31.9  ICD-9-CM: 296.50  7/8/2022        Generalized anxiety disorder ICD-10-CM: F41.1  ICD-9-CM: 300.02  7/8/2022        Adenomyomatosis of gallbladder ICD-10-CM: D13.5  ICD-9-CM: 211.5  6/6/2022        Smoking ICD-10-CM: F17.200  ICD-9-CM: 305.1  5/13/2019           [facility] of Autoimmune Hepatitis. The active problem list, all pertinent past medical history, medications, liver histology, radiologic findings and laboratory findings related to the liver disorder were reviewed and discussed with the patient. The patient is a 38 y.o.   female who was found to have elevated  liver transaminases and   alkaline phosphatase in 2/2022 during a routine physcial.     Serologic evaluation for markers of chronic liver disease was strongly positive for ASMA, ANCA.    Ultrasound of the liver was performed in 5/2022. The results of the imaging demonstrated a normal appearing liver. A liver biopsy was performed in 6/2022. Dr. Castillo Samaniego reviewed the biopsy slides which demonstrated severe inflammation, moderate piecemeal necrosis. No steatosis. Bridging fibrosis. Consistent with severe AIH. The patient was started on treatment for AIH with prednisone 60 mg daily on 7/08/2022. The prednisone has been tapered and is now at 15 mg daily. She is tolerating the treatment with no reported side effects. Cellcept was added at a dose of 500 mg twice daily 9/16/2022. She is tolerating the treatment with no reported side effects. Of note, she was diagnosed with Covid 2/2022. The patient has the following symptoms which could be attributed to the liver disorder:  Itching, rash, weakness, poor appetite and fatigue. The patient is not experiencing the following symptoms which are commonly seen in this liver disorder: Pain in the right side over the liver, yellowing of the eyes or skin, swelling of the abdomen, or swelling of the lower extremities. The patient completes all daily activities without any functional limitations. ASSESSMENT AND PLAN:  Autoimmune Hepatitis   The diagnosis was based upon laboratory studies, serology, and liver biopsy. A liver biopsy performed in 6/2022 shows severe inflammation with moderate piecemeal necrosis   and Stage 3 bridging fibrosis. Have performed laboratory testing to monitor liver function and degree of liver injury. This included BMP, hepatic panel, and CBC with platelet count. Laboratory testing from 9/16/2022 reviewed in detail. Follow-up testing ordered today. The AST is normal. The ALT is elevated. The ALP is normal. The liver function and platelet count are normal.     Treatment for AIH was started 7/08/2022 with prednisone 60 mg daily. She is currently on 15 mg daily.  Cellcept was started at 500 mg twice daily 9/16/2022. Treatment has been well tolerated. Will now decrease prednisone to 10 mg daily. She will remain on this dose for the next 3 months. Screening for Hepatocellular Carcinoma  HCC screening is not necessary if the patient has no evidence of cirrhosis. Treatment of other medical problems in patients with chronic liver disease  There are no contraindications for the patient to take most medications that are necessary for treatment of other medical issues. The patient can take any medications utilized for treatment of DM. Statins to treat hypercholesterolemia    Normal doses of acetaminophen, as recommended on the label of the bottle, are not hepatotoxic except in the setting of daily alcohol use, even in patients with cirrhosis and can be utilized for pain. Counseling for alcohol in patients with chronic liver disease  The patient does not drink. Vaccinations   Vaccination for viral hepatitis B is recommended since the patient has no serologic evidence of previous exposure or vaccination with immunity. Vaccination for viral hepatitis A is not needed. The patient has serologic evidence of prior exposure or vaccination with immunity. The patient had COVID-19 infection and recovered. Routine vaccinations against other bacterial and viral agents can be performed as indicated. Annual flu vaccination should be administered if indicated. ALLERGIES  No Known Allergies    MEDICATIONS  Current Outpatient Medications   Medication Sig    mycophenolate (CellCept) 500 mg tablet Take 1 Tablet by mouth two (2) times a day. predniSONE (DELTASONE) 5 mg tablet Take 3 Tablets by mouth daily. DULoxetine (CYMBALTA) 60 mg capsule Take 60 mg by mouth in the morning. clonazePAM (KlonoPIN) 1 mg tablet TAKE 1 TABLET BY MOUTH TWICE A DAY AS NEEDED    lamoTRIgine (LaMICtal) 200 mg tablet 100 mg.    multivitamin (ONE A DAY) tablet Take 1 Tab by mouth daily.     ibuprofen (MOTRIN) 200 mg tablet Take  by mouth. No current facility-administered medications for this visit. SYSTEM REVIEW NOT RELATED TO LIVER DISEASE OR REVIEWED ABOVE:  Constitution systems: Negative for fever, chills, weight gain, weight loss. Eyes: Negative for visual changes. ENT: Negative for sore throat, painful swallowing. Respiratory: Negative for cough, hemoptysis, SOB. Cardiology: Negative for chest pain, palpitations. GI:  Negative for constipation or diarrhea. : Negative for urinary frequency, dysuria, hematuria, nocturia. Skin: Negative for rash. Hematology: Negative for easy bruising, blood clots. Musculo-skeletal: Negative for back pain, muscle pain, weakness. Neurologic: Negative for headaches, dizziness, vertigo, memory problems not related to HE. Psychology: Negative for anxiety, depression. FAMILY HISTORY:  The father  of MVA, history of DM. The mother diagnosed with autoimmune hepatitis not treated. Maternal grandmother cholangiocarcinoma. The following family members have liver disease: Mother   There is no family history of immune disorders. SOCIAL HISTORY:  The patient is . The patient has 2 children. The patient currently smokes 3-5 cigarettes daily. The patient does not drink. The patient does not work. PHYSICAL EXAMINATION:  Visit Vitals  /73 (BP 1 Location: Left upper arm, BP Patient Position: Sitting, BP Cuff Size: Adult)   Pulse 95   Temp (!) 96.6 °F (35.9 °C) (Temporal)   Ht 5' 5\" (1.651 m)   Wt 157 lb (71.2 kg)   SpO2 98%   BMI 26.13 kg/m²       General: No acute distress. Eyes: Sclera anicteric. ENT: No oral lesions. Thyroid normal.  Nodes: No adenopathy. Skin: No spider angiomata. No jaundice. No palmar erythema. Respiratory: Lungs clear to auscultation. Cardiovascular: Regular heart rate. No murmurs. No JVD. Abdomen: Soft non-tender, liver size normal to percussion/palpation. Spleen not palpable.  No obvious ascites. Extremities: No edema. No muscle wasting. No gross arthritic changes. Neurologic: Alert and oriented. Cranial nerves grossly intact. No asterixis. LABORATORY STUDIES:  [address] & Units 9/16/2022 8/19/2022   WBC 3.6 - 11.0 K/uL 9.7 10.2   ANC 1.8 - 8.0 K/UL 4.8 5.1   HGB 11.5 - 16.0 g/dL 13.6 13.7    - 400 K/uL 302 271   INR 0.9 - 1.2     AST 15 - 37 U/L 30 35   ALT 12 - 78 U/L 52 57   Alk Phos 45 - 117 U/L 48 49   Bili, Total 0.2 - 1.0 MG/DL 0.4 0.4   Bili, Direct 0.0 - 0.2 MG/DL 0.1 0.2   Albumin 3.5 - 5.0 g/dL 3.7 3.2 (L)   BUN 6 - 20 MG/DL 7 9   Creat 0.55 - 1.02 MG/DL 0.71 0.68   Na 136 - 145 mmol/L 140 138   K 3.5 - 5.1 mmol/L 3.7 5.0   Cl 97 - 108 mmol/L 105 105   CO2 21 - 32 mmol/L 25 31   Glucose 65 - 100 mg/dL 77 78     Liver [facility] Latest Ref Rng & Units 7/22/2022   WBC 3.6 - 11.0 K/uL 14.5 (H)   ANC 1.8 - 8.0 K/UL 9.1 (H)   HGB 11.5 - 16.0 g/dL 13.9    - 400 K/uL 286   INR 0.9 - 1.2    AST 15 - 37 U/L 24   ALT 12 - 78 U/L 67   Alk Phos 45 - 117 U/L 78   Bili, Total 0.2 - 1.0 MG/DL 0.7   Bili, Direct 0.0 - 0.2 MG/DL 0.3 (H)   Albumin 3.5 - 5.0 g/dL 3.1 (L)   BUN 6 - 20 MG/DL 8   Creat 0.55 - 1.02 MG/DL 0.68   Na 136 - 145 mmol/L 137   K 3.5 - 5.1 mmol/L 4.8   Cl 97 - 108 mmol/L 104   CO2 21 - 32 mmol/L 30   Glucose 65 - 100 mg/dL 78     Laboratory testing from 9/16/2022 reviewed in detail. Additional testing included to evaluate progression or regression of disease. Laboratory testing results from today will be communicated by My Chart.      SEROLOGIES:  Serologies Latest Ref Rng & Units 4/5/2022   Ferritin 15 - 150 ng/mL 93     Serologies Latest Ref Rng & Units 4/5/2022   M2 Ab 0.0 - 20.0 Units <20.0   LKM 0.0 - 20.0 Units 1.1   Ceruloplasmin 19.0 - 39.0 mg/dL 23.4     Serologies Latest Ref Rng & Units 3/1/2022   Hep C Ab NONREACTIVE   NONREACTIVE     Serologies Latest Ref Rng & Units 7/8/2022   Hep A Ab, Total Negative Positive (A)   Hep B Surface Ag Negative Negative     Serologies Latest Ref Rng & Units 7/8/2022   Hep B Core Ab, Total Negative Negative   Hep B Surface AB QL  Non Reactive     Serologies Latest Ref Rng & Units 7/8/2022   Ferritin 15 - 150 ng/mL 443 (H)   Iron % Saturation 15 - 55 % 61 (H)   ANGEL, IFA  Negative   C-ANCA Neg:<1:20 titer <1:20   P-ANCA Neg:<1:20 titer <1:20   ANCA Neg:<1:20 titer 1:640 (H)   ASMCA 0 - 19 Units 93 (H)   M2 Ab 0.0 - 20.0 Units <20.0   LKM 0.0 - 20.0 Units    Ceruloplasmin 19.0 - 39.0 mg/dL 19.9   Alpha-1 antitrypsin level 101 - 187 mg/dL 195 (H)     LIVER HISTOLOGY:  6/2022. Liver Biopsy reviewed by MLS. Severe inflammation. Moderate piecemeal necrosis. No steatosis. Bridging fibrosis. Knodell score 15 (6,3,3,3). Mahendra 4. Metavir 3. Consistent with severe AIH.     ENDOSCOPIC PROCEDURES:  Not available or performed    RADIOLOGY:  5/2022. Ultrasound of liver. Normal appearing liver. No liver mass lesions. OTHER TESTING:  Not available or performed    FOLLOW-UP:  All of the issues listed above in the Assessment and Plan were discussed with the patient. All questions were answered. The patient expressed a clear understanding of the above. [address] [address] in 3 months for ongoing monitoring. April S.  Holly, [facility] of [address] [id number] [address], [address]  [address] [facility]

## 2022-10-17 NOTE — PROGRESS NOTES
Letter sent via my chart regarding the blood work results. The liver enzymes are normal. Will continue cellcept and decrease prednisone to 10 mg daily.

## 2022-10-23 NOTE — PROGRESS NOTES
.  Chief Complaint   Patient presents with    Physical    Depression        Visit Vitals  BP 99/65 (BP 1 Location: Left upper arm, BP Patient Position: Sitting)   Pulse 83   Temp 97.5 °F (36.4 °C) (Temporal)   Resp 16   Ht 5' 5.35\" (1.66 m)   Wt 130 lb (59 kg)   LMP 02/19/2022   SpO2 98%   BMI 21.40 kg/m²        1. Have you been to the ER, urgent care clinic since your last visit? Hospitalized since your last visit? No    2. Have you seen or consulted any other health care providers outside of the [address] [place] [address] since your last visit? Include any pap smears or colon screening.  No Pt presents with cough and congestion x 6 days. No fevers. Pt reports outer wrist is painful. History of Gout. Pt reports no trauma to wrist, no known injury.

## 2023-01-03 ENCOUNTER — HOSPITAL ENCOUNTER (EMERGENCY)
Age: 43
Discharge: HOME | End: 2023-01-03
Attending: STUDENT IN AN ORGANIZED HEALTH CARE EDUCATION/TRAINING PROGRAM
Payer: COMMERCIAL

## 2023-01-03 VITALS
BODY MASS INDEX: 23.7 KG/M2 | SYSTOLIC BLOOD PRESSURE: 141 MMHG | OXYGEN SATURATION: 98 % | HEIGHT: 67 IN | TEMPERATURE: 98 F | RESPIRATION RATE: 16 BRPM | WEIGHT: 151.01 LBS | HEART RATE: 86 BPM | DIASTOLIC BLOOD PRESSURE: 97 MMHG

## 2023-01-03 DIAGNOSIS — K21.9 GASTROESOPHAGEAL REFLUX DISEASE, UNSPECIFIED WHETHER ESOPHAGITIS PRESENT: Primary | ICD-10-CM

## 2023-01-03 DIAGNOSIS — M54.9 MUSCULOSKELETAL BACK PAIN: ICD-10-CM

## 2023-01-03 DIAGNOSIS — G44.209 TENSION HEADACHE: ICD-10-CM

## 2023-01-03 LAB
ALBUMIN SERPL-MCNC: 4 G/DL (ref 3.5–5)
ALBUMIN/GLOB SERPL: 1 {RATIO} (ref 1.1–2.2)
ALP SERPL-CCNC: 51 U/L (ref 45–117)
ALT SERPL-CCNC: 46 U/L (ref 12–78)
ANION GAP SERPL CALC-SCNC: 11 MMOL/L (ref 5–15)
APPEARANCE UR: ABNORMAL
AST SERPL-CCNC: 41 U/L (ref 15–37)
ATRIAL RATE: 75 BPM
BACTERIA URNS QL MICRO: ABNORMAL /HPF
BASOPHILS # BLD: 0.1 K/UL (ref 0–0.1)
BASOPHILS NFR BLD: 1 % (ref 0–1)
BILIRUB SERPL-MCNC: 0.6 MG/DL (ref 0.2–1)
BILIRUB UR QL CFM: NEGATIVE
BUN SERPL-MCNC: 7 MG/DL (ref 6–20)
BUN/CREAT SERPL: 10 (ref 12–20)
CALCIUM SERPL-MCNC: 9.1 MG/DL (ref 8.5–10.1)
CALCULATED P AXIS, ECG09: -5 DEGREES
CALCULATED R AXIS, ECG10: 47 DEGREES
CALCULATED T AXIS, ECG11: 38 DEGREES
CHLORIDE SERPL-SCNC: 104 MMOL/L (ref 97–108)
CO2 SERPL-SCNC: 24 MMOL/L (ref 21–32)
COLOR UR: ABNORMAL
CREAT SERPL-MCNC: 0.73 MG/DL (ref 0.55–1.02)
DIAGNOSIS, 93000: NORMAL
DIFFERENTIAL METHOD BLD: NORMAL
EOSINOPHIL # BLD: 0.1 K/UL (ref 0–0.4)
EOSINOPHIL NFR BLD: 1 % (ref 0–7)
EPITH CASTS URNS QL MICRO: ABNORMAL /LPF
ERYTHROCYTE [DISTWIDTH] IN BLOOD BY AUTOMATED COUNT: 13.1 % (ref 11.5–14.5)
GLOBULIN SER CALC-MCNC: 4 G/DL (ref 2–4)
GLUCOSE SERPL-MCNC: 85 MG/DL (ref 65–100)
GLUCOSE UR STRIP.AUTO-MCNC: NEGATIVE MG/DL
HCG UR QL: NEGATIVE
HCT VFR BLD AUTO: 39.8 % (ref 35–47)
HGB BLD-MCNC: 13.4 G/DL (ref 11.5–16)
HGB UR QL STRIP: NEGATIVE
IMM GRANULOCYTES # BLD AUTO: 0 K/UL (ref 0–0.04)
IMM GRANULOCYTES NFR BLD AUTO: 0 % (ref 0–0.5)
KETONES UR QL STRIP.AUTO: NEGATIVE MG/DL
LEUKOCYTE ESTERASE UR QL STRIP.AUTO: NEGATIVE
LIPASE SERPL-CCNC: 89 U/L (ref 73–393)
LYMPHOCYTES # BLD: 3.4 K/UL (ref 0.8–3.5)
LYMPHOCYTES NFR BLD: 49 % (ref 12–49)
MCH RBC QN AUTO: 30.7 PG (ref 26–34)
MCHC RBC AUTO-ENTMCNC: 33.7 G/DL (ref 30–36.5)
MCV RBC AUTO: 91.1 FL (ref 80–99)
MONOCYTES # BLD: 0.6 K/UL (ref 0–1)
MONOCYTES NFR BLD: 9 % (ref 5–13)
NEUTS SEG # BLD: 2.7 K/UL (ref 1.8–8)
NEUTS SEG NFR BLD: 40 % (ref 32–75)
NITRITE UR QL STRIP.AUTO: NEGATIVE
NRBC # BLD: 0 K/UL (ref 0–0.01)
NRBC BLD-RTO: 0 PER 100 WBC
P-R INTERVAL, ECG05: 132 MS
PH UR STRIP: 5.5 [PH] (ref 5–8)
PLATELET # BLD AUTO: 330 K/UL (ref 150–400)
PMV BLD AUTO: 9.7 FL (ref 8.9–12.9)
POTASSIUM SERPL-SCNC: 3.5 MMOL/L (ref 3.5–5.1)
PROT SERPL-MCNC: 8 G/DL (ref 6.4–8.2)
PROT UR STRIP-MCNC: NEGATIVE MG/DL
Q-T INTERVAL, ECG07: 392 MS
QRS DURATION, ECG06: 86 MS
QTC CALCULATION (BEZET), ECG08: 437 MS
RBC # BLD AUTO: 4.37 M/UL (ref 3.8–5.2)
RBC #/AREA URNS HPF: ABNORMAL /HPF (ref 0–5)
SODIUM SERPL-SCNC: 139 MMOL/L (ref 136–145)
SP GR UR REFRACTOMETRY: >1.03 (ref 1–1.03)
UA: UC IF INDICATED,UAUC: ABNORMAL
UROBILINOGEN UR QL STRIP.AUTO: 1 EU/DL (ref 0.2–1)
VENTRICULAR RATE, ECG03: 75 BPM
WBC # BLD AUTO: 6.8 K/UL (ref 3.6–11)
WBC URNS QL MICRO: ABNORMAL /HPF (ref 0–4)

## 2023-01-03 PROCEDURE — 36415 COLL VENOUS BLD VENIPUNCTURE: CPT

## 2023-01-03 PROCEDURE — 93005 ELECTROCARDIOGRAM TRACING: CPT

## 2023-01-03 PROCEDURE — 83690 ASSAY OF LIPASE: CPT

## 2023-01-03 PROCEDURE — 99284 EMERGENCY DEPT VISIT MOD MDM: CPT

## 2023-01-03 PROCEDURE — 80053 COMPREHEN METABOLIC PANEL: CPT

## 2023-01-03 PROCEDURE — 81001 URINALYSIS AUTO W/SCOPE: CPT

## 2023-01-03 PROCEDURE — 85025 COMPLETE CBC W/AUTO DIFF WBC: CPT

## 2023-01-03 PROCEDURE — 81025 URINE PREGNANCY TEST: CPT

## 2023-01-03 RX ORDER — DICLOFENAC SODIUM 10 MG/G
4 GEL TOPICAL 4 TIMES DAILY
Qty: 1 EACH | Refills: 3 | Status: SHIPPED | OUTPATIENT
Start: 2023-01-03

## 2023-01-03 RX ORDER — CYCLOBENZAPRINE HCL 10 MG
10 TABLET ORAL
Qty: 20 TABLET | Refills: 0 | Status: SHIPPED | OUTPATIENT
Start: 2023-01-03

## 2023-01-03 RX ORDER — ONDANSETRON 4 MG/1
4 TABLET, FILM COATED ORAL
Qty: 20 TABLET | Refills: 0 | Status: SHIPPED | OUTPATIENT
Start: 2023-01-03

## 2023-01-03 RX ORDER — PHENOL/SODIUM PHENOLATE
20 AEROSOL, SPRAY (ML) MUCOUS MEMBRANE DAILY
Qty: 14 TABLET | Refills: 0 | Status: SHIPPED | OUTPATIENT
Start: 2023-01-03 | End: 2023-01-17

## 2023-01-03 NOTE — ED PROVIDER NOTES
EMERGENCY DEPARTMENT HISTORY AND PHYSICAL EXAM      Date: 1/3/2023  Patient Name: Emelyn Aceves    History of Presenting Illness     HPI: Kacie Baldwin, 38 y.o. female with past medical history of autoimmune hepatitis, anxiety, bipolar depression, TBI, PTSD, adenomyomatosis of gallbladder, cervical radiculopathy, presents to the ED with mother for cc of multiple complaints. Patient complains of intermittent abdominal pain for \"a long time. \"  Localizes abdominal pain to the epigastric upper abdominal region. States that it often feels like a burning acid like sensation. States that she has associated nausea and acid reflux. She does admit to taking Advil every single day for headaches that she has also had for at least several weeks or even longer. No diarrhea, constipation. No fevers or chills. Mom reports patient only started complaining of daily headaches to her several weeks ago. Patient currently denies any headache on my evaluation. No recent head trauma. No associated concerning neurologic symptoms. No neck stiffness. In addition to the above, she also complains of intermittent bilateral flank/back pain, also not present at the time of my evaluation. She reports associated intermittent urinary frequency without dysuria, hematuria, or urgency. PCP: Amira Mendoza MD    No current facility-administered medications on file prior to encounter. Current Outpatient Medications on File Prior to Encounter   Medication Sig Dispense Refill    mycophenolate (CellCept) 500 mg tablet Take 1 Tablet by mouth two (2) times a day. 60 Tablet 5    predniSONE (DELTASONE) 5 mg tablet Take 3 Tablets by mouth daily. (Patient taking differently: Take 10 mg by mouth daily.) 90 Tablet 4    lamoTRIgine (LaMICtal) 200 mg tablet 100 mg.      multivitamin (ONE A DAY) tablet Take 1 Tab by mouth daily. ibuprofen (MOTRIN) 200 mg tablet Take 800 mg by mouth.       DULoxetine (CYMBALTA) 60 mg capsule Take 60 mg by mouth in the morning. (Patient not taking: Reported on 1/3/2023)      clonazePAM (KlonoPIN) 1 mg tablet TAKE 1 TABLET BY MOUTH TWICE A DAY AS NEEDED (Patient not taking: Reported on 1/3/2023)         Past History     Past Medical History:  Past Medical History:   Diagnosis Date    ADD (attention deficit disorder)     Adenomyomatosis of gallbladder 06/06/2022    Anxiety     Depression     bipolar    Hepatitis        Past Surgical History:  Past Surgical History:   Procedure Laterality Date    ENDOMETRIAL CRYOABLATION  2003    HX GYN      Endometrial Ablation    HX TUBAL LIGATION  2003       Family History:  Family History   Problem Relation Age of Onset    Diabetes Father     Diabetes Paternal Grandfather     Cancer Paternal Grandfather         LUNG    Heart Disease Maternal Grandmother     Psychiatric Disorder Paternal Grandmother     Cancer Paternal Grandmother         LUNG       Social History:  Social History     Tobacco Use    Smoking status: Every Day     Packs/day: 0.50     Years: 32.00     Pack years: 16.00     Types: Cigarettes     Start date: 2/23/1990    Smokeless tobacco: Never   Vaping Use    Vaping Use: Never used   Substance Use Topics    Alcohol use: Not Currently     Comment: rare    Drug use: No       Allergies:  No Known Allergies      Review of Systems   Review of Systems   Constitutional:  Negative for chills and fever. HENT:  Negative for congestion and rhinorrhea. Eyes:  Negative for visual disturbance. Respiratory:  Negative for chest tightness and shortness of breath. Cardiovascular:  Negative for chest pain, palpitations and leg swelling. Gastrointestinal:  Positive for abdominal pain and nausea. Negative for diarrhea and vomiting.        +acid reflux   Genitourinary:  Positive for frequency. Negative for dysuria, flank pain and hematuria. Musculoskeletal:  Positive for back pain. Negative for neck pain. Skin:  Negative for rash.    Allergic/Immunologic: Negative for immunocompromised state. Neurological:  Positive for headaches. Negative for dizziness, speech difficulty and weakness. Hematological:  Negative for adenopathy. Psychiatric/Behavioral:  Negative for dysphoric mood and suicidal ideas. Physical Exam   Physical Exam  Vitals and nursing note reviewed. Constitutional:       General: She is not in acute distress. Appearance: She is not ill-appearing or toxic-appearing. HENT:      Head: Normocephalic and atraumatic. Nose: Nose normal.      Mouth/Throat:      Mouth: Mucous membranes are moist.   Eyes:      Extraocular Movements: Extraocular movements intact. Pupils: Pupils are equal, round, and reactive to light. Cardiovascular:      Rate and Rhythm: Normal rate and regular rhythm. Pulses: Normal pulses. Pulmonary:      Effort: Pulmonary effort is normal.      Breath sounds: No stridor. No wheezing or rhonchi. Abdominal:      General: Abdomen is flat. Bowel sounds are normal. There is no distension. Palpations: Abdomen is soft. Tenderness: There is no abdominal tenderness. There is no right CVA tenderness, left CVA tenderness, guarding or rebound. Musculoskeletal:         General: Normal range of motion. Cervical back: Normal, normal range of motion and neck supple. Lumbar back: Normal.   Skin:     General: Skin is warm and dry. Neurological:      General: No focal deficit present. Mental Status: She is alert and oriented to person, place, and time.    Psychiatric:         Judgment: Judgment normal.       Diagnostic Study Results     Labs -     Recent Results (from the past 24 hour(s))   CBC WITH AUTOMATED DIFF    Collection Time: 01/03/23  5:20 AM   Result Value Ref Range    WBC 6.8 3.6 - 11.0 K/uL    RBC 4.37 3.80 - 5.20 M/uL    HGB 13.4 11.5 - 16.0 g/dL    HCT 39.8 35.0 - 47.0 %    MCV 91.1 80.0 - 99.0 FL    MCH 30.7 26.0 - 34.0 PG    MCHC 33.7 30.0 - 36.5 g/dL    RDW 13.1 11.5 - 14.5 %    PLATELET 988 150 - 400 K/uL    MPV 9.7 8.9 - 12.9 FL    NRBC 0.0 0  WBC    ABSOLUTE NRBC 0.00 0.00 - 0.01 K/uL    NEUTROPHILS 40 32 - 75 %    LYMPHOCYTES 49 12 - 49 %    MONOCYTES 9 5 - 13 %    EOSINOPHILS 1 0 - 7 %    BASOPHILS 1 0 - 1 %    IMMATURE GRANULOCYTES 0 0.0 - 0.5 %    ABS. NEUTROPHILS 2.7 1.8 - 8.0 K/UL    ABS. LYMPHOCYTES 3.4 0.8 - 3.5 K/UL    ABS. MONOCYTES 0.6 0.0 - 1.0 K/UL    ABS. EOSINOPHILS 0.1 0.0 - 0.4 K/UL    ABS. BASOPHILS 0.1 0.0 - 0.1 K/UL    ABS. IMM. GRANS. 0.0 0.00 - 0.04 K/UL    DF AUTOMATED     METABOLIC PANEL, COMPREHENSIVE    Collection Time: 01/03/23  5:20 AM   Result Value Ref Range    Sodium 139 136 - 145 mmol/L    Potassium 3.5 3.5 - 5.1 mmol/L    Chloride 104 97 - 108 mmol/L    CO2 24 21 - 32 mmol/L    Anion gap 11 5 - 15 mmol/L    Glucose 85 65 - 100 mg/dL    BUN 7 6 - 20 MG/DL    Creatinine 0.73 0.55 - 1.02 MG/DL    BUN/Creatinine ratio 10 (L) 12 - 20      eGFR >60 >60 ml/min/1.73m2    Calcium 9.1 8.5 - 10.1 MG/DL    Bilirubin, total 0.6 0.2 - 1.0 MG/DL    ALT (SGPT) 46 12 - 78 U/L    AST (SGOT) 41 (H) 15 - 37 U/L    Alk.  phosphatase 51 45 - 117 U/L    Protein, total 8.0 6.4 - 8.2 g/dL    Albumin 4.0 3.5 - 5.0 g/dL    Globulin 4.0 2.0 - 4.0 g/dL    A-G Ratio 1.0 (L) 1.1 - 2.2     URINALYSIS W/ REFLEX CULTURE    Collection Time: 01/03/23  5:20 AM    Specimen: Miscellaneous sample; Urine    Urine specimen   Result Value Ref Range    Color YELLOW/STRAW      Appearance HAZY (A) CLEAR      Specific gravity >1.030 (H) 1.003 - 1.030    pH (UA) 5.5 5.0 - 8.0      Protein Negative NEG mg/dL    Glucose Negative NEG mg/dL    Ketone Negative NEG mg/dL    Blood Negative NEG      Urobilinogen 1.0 0.2 - 1.0 EU/dL    Nitrites Negative NEG      Leukocyte Esterase Negative NEG      WBC 0-4 0 - 4 /hpf    RBC 0-5 0 - 5 /hpf    Epithelial cells MODERATE (A) FEW /lpf    Bacteria 1+ (A) NEG /hpf    UA:UC IF INDICATED CULTURE NOT INDICATED BY UA RESULT     LIPASE    Collection Time: 01/03/23  5:20 AM Result Value Ref Range    Lipase 89 73 - 393 U/L   BILIRUBIN, CONFIRM    Collection Time: 01/03/23  5:20 AM   Result Value Ref Range    Bilirubin UA, confirm Negative NEG     HCG URINE, QL. - POC    Collection Time: 01/03/23  5:24 AM   Result Value Ref Range    Pregnancy test,urine (POC) Negative NEG         Radiologic Studies -   No orders to display     CT Results  (Last 48 hours)      None          CXR Results  (Last 48 hours)      None            Medical Decision Making   I, Noy Wild MD-- am the first provider for this patient, and I am the attending of record for this patient encounter. I reviewed the vital signs, available nursing notes, past medical history, past surgical history, family history and social history. Vital Signs-Reviewed the patient's vital signs. Patient Vitals for the past 24 hrs:   Temp Pulse Resp BP SpO2   01/03/23 0452 98 °F (36.7 °C) 86 16 (!) 141/97 98 %       EKG interpretation: (Preliminary)  Normal sinus rhythm, 75 bpm, no acute ST changes concerning for ischemia. QTc normal. EKG interpretation by Noy Wild MD    Records Reviewed: Nursing Notes and Old Medical Records    Provider Notes (Medical Decision Making):   DDx: GERD/gastritis/PUD, pancreatitis, hepatitis flare, intermittent tension headache, musculoskeletal back pain versus UTI/pyelonephritis versus kidney stone. Patient currently without any reproducible abdominal pain, no active headache at the time of my evaluation, and no back pain that is reproducible on my evaluation either. Suspect headache is tension in nature, especially with her history of cervical radiculopathy. Possible component of rebound headaches from chronic daily use of NSAIDs, creating a signs of dependence. Suspect intermittent back pain also MSK in nature. No CVA tenderness on my evaluation. Low suspicion for renal pathology. However, as patient is complaining of urinary frequency, will check UA.   In terms of her abdominal pain, most likely etiology is GERD/gastritis/PUD from daily NSAID dependence. However, with her history of autoimmune hepatitis, will check labs to ensure no additional process of concern. Low suspicion for acute surgical abdomen based on history and exam.    Plan: Will check EKG, CBC, CMP, lipase, UA, urine hcg. ED Course as of 01/03/23 0604   Tue Jan 03, 2023   0600 Labs largely unremarkable. No leukocytosis. Renal function within normal limits. LFTs largely normal.  Lipase negative. UA negative for infection. Results reviewed with the patient as well as mother. Suspect gastritis/GERD, tension headache from chronic cervical DDD and pain, along with muscle skeletal intermittent back pain as the cause of her complaints today. Patient and mother in agreement with suspected etiologies above. We will treat with Rx for omeprazole, Zofran, lidocaine patch, and Flexeril. Advised to avoid NSAIDs as much as possible, and to use adjunct therapies for treatment of her headache, neck pain, and back pain symptoms. Also advised follow-up with PCP for possible PT referral should symptoms not improve. In addition discussed avoidance of triggers such as spicy foods, coffee, alcohol, smoking etc.  Patient and mother felt comfortable with plan for discharge. They already have scheduled PCP upcoming appointment for repeat evaluation ongoing management at that time. Discharged in stable condition with return precautions discussed. [JM]      ED Course User Index  [JM] Ginny Mtz MD       ED Course:   Initial assessment performed. The patient's presenting problems have been discussed, and they are in agreement with the care plan formulated and outlined with them. I have encouraged them to ask questions as they arise throughout their visit. Blade Siddiqui MD      Disposition:  DC      DISCHARGE PLAN:  1.    Current Discharge Medication List        START taking these medications    Details   Omeprazole delayed release (PRILOSEC D/R) 20 mg tablet Take 1 Tablet by mouth daily for 14 days. Qty: 14 Tablet, Refills: 0  Start date: 1/3/2023, End date: 1/17/2023      diclofenac (VOLTAREN) 1 % gel Apply 4 g to affected area four (4) times daily. Use dosing card to measure dose. Do not apply to open wounds  Qty: 1 Each, Refills: 3  Start date: 1/3/2023      ondansetron hcl (Zofran) 4 mg tablet Take 1 Tablet by mouth every eight (8) hours as needed for Nausea or Vomiting. Qty: 20 Tablet, Refills: 0  Start date: 1/3/2023      cyclobenzaprine (FLEXERIL) 10 mg tablet Take 1 Tablet by mouth three (3) times daily as needed for Muscle Spasm(s). Qty: 20 Tablet, Refills: 0  Start date: 1/3/2023           2. Follow-up Information       Follow up With Specialties Details Why Oh Bañuelos MD Internal Medicine Physician Schedule an appointment as soon as possible for a visit   [address] [place] [address]  [phone]            3. Return to ED if worse     Diagnosis     Clinical Impression:   1. Gastroesophageal reflux disease, unspecified whether esophagitis present    2. Tension headache    3. Musculoskeletal back pain        Attestations:    Viktoria Sweeney MD    Please note that this dictation was completed with [employer], the computer voice recognition software. Quite often unanticipated grammatical, syntax, homophones, and other interpretive errors are inadvertently transcribed by the computer software. Please disregard these errors. Please excuse any errors that have escaped final proofreading. Thank you.

## 2023-01-03 NOTE — ED TRIAGE NOTES
Triage note:generalized abdomen pain x 2 weeks with heartburn. patient has hepatitis with liver problems and takes ibuprofen daily. patient with a history of TBI here with her mother. patient also with bilateral flank pain and urinating more often then usual also having nausea.

## 2023-01-11 ENCOUNTER — OFFICE VISIT (OUTPATIENT)
Dept: PRIMARY CARE CLINIC | Age: 43
End: 2023-01-11
Payer: COMMERCIAL

## 2023-01-11 VITALS
SYSTOLIC BLOOD PRESSURE: 108 MMHG | RESPIRATION RATE: 18 BRPM | OXYGEN SATURATION: 100 % | BODY MASS INDEX: 23.7 KG/M2 | HEIGHT: 67 IN | WEIGHT: 151 LBS | HEART RATE: 83 BPM | TEMPERATURE: 97 F | DIASTOLIC BLOOD PRESSURE: 74 MMHG

## 2023-01-11 DIAGNOSIS — G89.29 CHRONIC NONINTRACTABLE HEADACHE, UNSPECIFIED HEADACHE TYPE: Primary | ICD-10-CM

## 2023-01-11 DIAGNOSIS — Z87.828 HISTORY OF HEAD INJURY: ICD-10-CM

## 2023-01-11 DIAGNOSIS — R74.8 ELEVATED LIVER ENZYMES: ICD-10-CM

## 2023-01-11 DIAGNOSIS — R51.9 CHRONIC NONINTRACTABLE HEADACHE, UNSPECIFIED HEADACHE TYPE: Primary | ICD-10-CM

## 2023-01-11 DIAGNOSIS — I78.1 SPIDER NEVUS: ICD-10-CM

## 2023-01-11 DIAGNOSIS — K75.4 AUTOIMMUNE HEPATITIS (HCC): ICD-10-CM

## 2023-01-11 DIAGNOSIS — F31.9 BIPOLAR AFFECTIVE DISORDER, REMISSION STATUS UNSPECIFIED (HCC): ICD-10-CM

## 2023-01-11 DIAGNOSIS — F41.9 ANXIETY: ICD-10-CM

## 2023-01-11 PROCEDURE — 99214 OFFICE O/P EST MOD 30 MIN: CPT | Performed by: INTERNAL MEDICINE

## 2023-01-11 RX ORDER — BUSPIRONE HYDROCHLORIDE 7.5 MG/1
7.5 TABLET ORAL 2 TIMES DAILY
Qty: 60 TABLET | Refills: 2 | Status: SHIPPED | OUTPATIENT
Start: 2023-01-11

## 2023-01-11 NOTE — PROGRESS NOTES
Chief Complaint   Patient presents with    Follow-up     Referral for neuro-psychologist        There were no vitals taken for this visit. 1. Have you been to the ER, urgent care clinic since your last visit? Hospitalized since your last visit? Yes ER [place]    2. Have you seen or consulted any other health care providers outside of the [address] [place] [address] since your last visit? Include any pap smears or colon screening. No      3. For patients aged 39-70: Has the patient had a colonoscopy / FIT/ Cologuard? NA - based on age      If the patient is female:    4. For patients aged 41-77: Has the patient had a mammogram within the past 2 years? No      5. For patients aged 21-65: Has the patient had a pap smear? Yes - Care Gap present.  Most recent result on file
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
## 2023-01-16 ENCOUNTER — OFFICE VISIT (OUTPATIENT)
Dept: HEMATOLOGY | Age: 43
End: 2023-01-16
Payer: COMMERCIAL

## 2023-01-16 VITALS
HEART RATE: 88 BPM | DIASTOLIC BLOOD PRESSURE: 72 MMHG | WEIGHT: 153 LBS | OXYGEN SATURATION: 99 % | TEMPERATURE: 97.2 F | SYSTOLIC BLOOD PRESSURE: 100 MMHG | BODY MASS INDEX: 23.96 KG/M2

## 2023-01-16 DIAGNOSIS — K75.4 AUTOIMMUNE HEPATITIS (HCC): Primary | ICD-10-CM

## 2023-01-16 DIAGNOSIS — K21.9 GASTROESOPHAGEAL REFLUX DISEASE WITHOUT ESOPHAGITIS: ICD-10-CM

## 2023-01-16 PROCEDURE — 99214 OFFICE O/P EST MOD 30 MIN: CPT | Performed by: NURSE PRACTITIONER

## 2023-01-16 RX ORDER — PREDNISONE 5 MG/1
10 TABLET ORAL DAILY
Qty: 60 TABLET | Refills: 2 | Status: SHIPPED | OUTPATIENT
Start: 2023-01-16

## 2023-01-16 RX ORDER — OMEPRAZOLE 20 MG/1
20 CAPSULE, DELAYED RELEASE ORAL DAILY
Qty: 30 CAPSULE | Refills: 1 | Status: SHIPPED | OUTPATIENT
Start: 2023-01-16

## 2023-01-16 NOTE — PROGRESS NOTES
Identified pt with two pt identifiers(name and ). Reviewed record in preparation for visit and have obtained necessary documentation. Chief Complaint   Patient presents with    Follow-up      Vitals:    23 1236   BP: 100/72   Pulse: 88   Temp: 97.2 °F (36.2 °C)   TempSrc: Temporal   SpO2: 99%   Weight: 153 lb (69.4 kg)   Height: (P) 5' 7\" (1.702 m)   PainSc: (P)   0 - No pain       Health Maintenance Review: Patient reminded of \"due or due soon\" health maintenance. I have asked the patient to contact his/her primary care provider (PCP) for follow-up on his/her health maintenance. Coordination of Care Questionnaire:  :   1) Have you been to an emergency room, urgent care, or hospitalized since your last visit? If yes, where when, and reason for visit? no       2. Have seen or consulted any other health care provider since your last visit? If yes, where when, and reason for visit? NO      Patient is accompanied by self I have received verbal consent from Cecilia Zhou to discuss any/all medical information while they are present in the room.

## 2023-01-16 NOTE — PROGRESS NOTES
[facility] [place], [place], [address], [place], [place]      DARRICK Davis, ACNP-BC     April S [facility] Institute[facility]BC   DANIEL Nunez-KRYSTIN Garcia, [facility]       Jona Deputado [address]    at [address], [address]    [phone]    FAX: [address] [place] [address] Avenue    [place] [address], [address] [place] [address], [address]    [phone]    FAX: [phone]       Patient Care Team:  Junior Jaswant MD as PCP - General (Internal Medicine Physician)  Junior Jaswant MD as PCP - [facility]  Odette Fernandes MD (Psychiatry)      Problem List  Date Reviewed: 10/16/2022            Codes Class Noted    Gastroesophageal reflux disease without esophagitis ICD-10-CM: K21.9  ICD-9-CM: 530.81  1/16/2023        PTSD (post-traumatic stress disorder) ICD-10-CM: F43.10  ICD-9-CM: 309.81  8/19/2022        Autoimmune hepatitis ([facility] ICD-10-CM: K75.4  ICD-9-CM: 571.42  7/8/2022        Bipolar depression ([facility] ICD-10-CM: F31.9  ICD-9-CM: 296.50  7/8/2022        Generalized anxiety disorder ICD-10-CM: F41.1  ICD-9-CM: 300.02  7/8/2022        Adenomyomatosis of gallbladder ICD-10-CM: D13.5  ICD-9-CM: 211.5  6/6/2022        Smoking ICD-10-CM: F17.200  ICD-9-CM: 305.1  5/13/2019           [facility] for management of Autoimmune Hepatitis. The active problem list, all pertinent past medical history, medications, liver histology, radiologic findings and laboratory findings related to the liver disorder were reviewed and discussed with the patient. The patient is a 38 y.o.   female who was found to have elevated  liver transaminases and   alkaline phosphatase in 2/2022 during a routine physcial.     Serologic evaluation for markers of chronic liver disease was strongly positive for ASMA, ANCA. Ultrasound of the liver was performed in 5/2022. The results of the imaging demonstrated a normal appearing liver. A liver biopsy was performed in 6/2022. Dr. Karina Ojeda reviewed the biopsy slides which demonstrated severe inflammation, moderate piecemeal necrosis. No steatosis. Bridging fibrosis. Consistent with severe AIH. The patient was started on treatment for AIH with prednisone 60 mg daily on 7/08/2022. The prednisone has been slowly tapered and has been on 10 mg daily for the past 3 months. She is tolerating the treatment with no reported side effects. Cellcept was added at a dose of 500 mg twice daily 9/16/2022. The liver enzymes have responded to treatment. Of note, she was diagnosed with Covid 2/2022. Since the last office visit the patient had an episode of gastritis which prompted a visit to [facility] ER. She was prescribed omeprazole 20 mg which has resolved the symptoms. Cymbalta was tapered off and she is now on Buspar. This does not seem to be as effective with anxiety. The patient has the following symptoms which could be attributed to the liver disorder:  Itching, rash, weakness, poor appetite and fatigue. All of these have now resolved. The patient is not experiencing the following symptoms which are commonly seen in this liver disorder: Pain in the right side over the liver, yellowing of the eyes or skin, swelling of the abdomen, or swelling of the lower extremities. The patient completes all daily activities without any functional limitations. ASSESSMENT AND PLAN:  Autoimmune Hepatitis   The diagnosis was based upon laboratory studies, serology, and liver biopsy. A liver biopsy performed in 6/2022 shows severe inflammation with moderate piecemeal necrosis   and Stage 3 bridging fibrosis.       Have performed laboratory testing to monitor liver function and degree of liver injury. This included BMP, hepatic panel, CBC with platelet count and INR. Laboratory testing ordered at the ER visit from 1/03/2023 reviewed in detail. Follow-up testing ordered today. The liver transaminases were slightly elevated but this may have been due to the gastritis. The ALP, liver function and platelet count are normal.     Treatment for AIH was started 7/08/2022 with prednisone 60 mg daily. She is currently on 10 mg daily. Cellcept was started at 500 mg twice daily 9/16/2022. Treatment continues to be well tolerated. Will continue to taper the prednisone with the following schedule 7.5 mg x 4 weeks, 5 mg x 4 week, 2.5 mg x 4 weeks. She will have blood work done in 6 weeks, orders placed today. Will continue cellcept at 500 mg twice daily. Gastritis  Patient is completing a 14 day course of omeprazole. Symptoms are improved but still present. Will continue omeprazole, prescription sent to the pharmacy. Mental status changes  The patient reports a head CT is ordered. An appointment with a psychiatrist is scheduled for the end of the month. Screening for Hepatocellular Carcinoma  HCC screening is not necessary if the patient has no evidence of cirrhosis. Treatment of other medical problems in patients with chronic liver disease  There are no contraindications for the patient to take most medications that are necessary for treatment of other medical issues. The patient can take any medications utilized for treatment of DM. Statins to treat hypercholesterolemia    Normal doses of acetaminophen, as recommended on the label of the bottle, are not hepatotoxic except in the setting of daily alcohol use, even in patients with cirrhosis and can be utilized for pain. Counseling for alcohol in patients with chronic liver disease  The patient does not drink.      Vaccinations   Vaccination for viral hepatitis B is recommended since the patient has no serologic evidence of previous exposure or vaccination with immunity. Vaccination for viral hepatitis A is not needed. The patient has serologic evidence of prior exposure or vaccination with immunity. The patient had COVID-19 infection and recovered. Routine vaccinations against other bacterial and viral agents can be performed as indicated. Annual flu vaccination should be administered if indicated. ALLERGIES  No Known Allergies    MEDICATIONS  Current Outpatient Medications   Medication Sig    predniSONE (DELTASONE) 5 mg tablet Take 2 Tablets by mouth daily. omeprazole (PRILOSEC) 20 mg capsule Take 1 Capsule by mouth daily. busPIRone (BUSPAR) 7.5 mg tablet Take 1 Tablet by mouth two (2) times a day. mycophenolate (CellCept) 500 mg tablet Take 1 Tablet by mouth two (2) times a day. lamoTRIgine (LaMICtal) 200 mg tablet 200 mg.    multivitamin (ONE A DAY) tablet Take 1 Tab by mouth daily. No current facility-administered medications for this visit. SYSTEM REVIEW NOT RELATED TO LIVER DISEASE OR REVIEWED ABOVE:  Constitution systems: Negative for fever, chills, weight gain, weight loss. Eyes: Negative for visual changes. ENT: Negative for sore throat, painful swallowing. Respiratory: Negative for cough, hemoptysis, SOB. Cardiology: Negative for chest pain, palpitations. GI:  Negative for constipation or diarrhea. : Negative for urinary frequency, dysuria, hematuria, nocturia. Skin: Negative for rash. Hematology: Negative for easy bruising, blood clots. Musculo-skeletal: Negative for back pain, muscle pain, weakness. Neurologic: Negative for headaches, dizziness, vertigo, memory problems not related to HE. Psychology: Negative for anxiety, depression. FAMILY HISTORY:  The father  of MVA, history of DM. The mother diagnosed with autoimmune hepatitis not treated. Maternal grandmother cholangiocarcinoma. The following family members have liver disease:  Mother   There is no family history of immune disorders. SOCIAL HISTORY:  The patient is . The patient has 2 children. The patient currently smokes 3-5 cigarettes daily. The patient does not drink. The patient does not work. PHYSICAL EXAMINATION:  Visit Vitals  /72 (BP 1 Location: Left upper arm, BP Patient Position: Sitting, BP Cuff Size: Adult)   Pulse 88   Temp 97.2 °F (36.2 °C) (Temporal)   Ht (P) 5' 7\" (1.702 m)   Wt 153 lb (69.4 kg)   SpO2 99%   BMI (P) 23.96 kg/m²       General: No acute distress. Eyes: Sclera anicteric. ENT: No oral lesions. Thyroid normal.  Nodes: No adenopathy. Skin: No spider angiomata. No jaundice. No palmar erythema. Respiratory: Lungs clear to auscultation. Cardiovascular: Regular heart rate. No murmurs. No JVD. Abdomen: Soft non-tender, liver size normal to percussion/palpation. Spleen not palpable. No obvious ascites. Extremities: No edema. No muscle wasting. No gross arthritic changes. Neurologic: Alert and oriented. Cranial nerves grossly intact. No asterixis. LABORATORY STUDIES:  [place]iver [place] of [address] Units 1/3/2023 10/14/2022   WBC 3.6 - 11.0 K/uL 6.8 9.0   ANC 1.8 - 8.0 K/UL 2.7 4.3   HGB 11.5 - 16.0 g/dL 13.4 13.6    - 400 K/uL 330 278   INR 0.9 - 1.2     AST 15 - 37 U/L 41 (H) 25   ALT 12 - 78 U/L 46 46   Alk Phos 45 - 117 U/L 51 43 (L)   Bili, Total 0.2 - 1.0 MG/DL 0.6 0.3   Bili, Direct 0.0 - 0.2 MG/DL  <0.1   Albumin 3.5 - 5.0 g/dL 4.0 3.6   BUN 6 - 20 MG/DL 7 7   Creat 0.55 - 1.02 MG/DL 0.73 0.65   Na 136 - 145 mmol/L 139 137   K 3.5 - 5.1 mmol/L 3.5 3.9   Cl 97 - 108 mmol/L 104 106   CO2 21 - 32 mmol/L 24 27   Glucose 65 - 100 mg/dL 85 101 (H)     Laboratory testing from 1/03/2023 reviewed in detail. Additional testing included to evaluate progression or regression of disease. Laboratory testing results from today will be communicated by My Chart.      SEROLOGIES:  Serologies Latest Ref Rng & Units 4/5/2022 Ferritin 15 - 150 ng/mL 93     Serologies Latest Ref Rng & Units 4/5/2022   M2 Ab 0.0 - 20.0 Units <20.0   LKM 0.0 - 20.0 Units 1.1   Ceruloplasmin 19.0 - 39.0 mg/dL 23.4     Serologies Latest Ref Rng & Units 3/1/2022   Hep C Ab NONREACTIVE   NONREACTIVE     Serologies Latest Ref Rng & Units 7/8/2022   Hep A Ab, Total Negative Positive (A)   Hep B Surface Ag Negative Negative     Serologies Latest Ref Rng & Units 7/8/2022   Hep B Core Ab, Total Negative Negative   Hep B Surface AB QL  Non Reactive     Serologies Latest Ref Rng & Units 7/8/2022   Ferritin 15 - 150 ng/mL 443 (H)   Iron % Saturation 15 - 55 % 61 (H)   ANGEL, IFA  Negative   C-ANCA Neg:<1:20 titer <1:20   P-ANCA Neg:<1:20 titer <1:20   ANCA Neg:<1:20 titer 1:640 (H)   ASMCA 0 - 19 Units 93 (H)   M2 Ab 0.0 - 20.0 Units <20.0   LKM 0.0 - 20.0 Units    Ceruloplasmin 19.0 - 39.0 mg/dL 19.9   Alpha-1 antitrypsin level 101 - 187 mg/dL 195 (H)     LIVER HISTOLOGY:  6/2022. Liver Biopsy reviewed by MLS. Severe inflammation. Moderate piecemeal necrosis. No steatosis. Bridging fibrosis. Knodell score 15 (6,3,3,3). Mahendra 4. Metavir 3. Consistent with severe AIH.     ENDOSCOPIC PROCEDURES:  Not available or performed    RADIOLOGY:  5/2022. Ultrasound of liver. Normal appearing liver. No liver mass lesions. OTHER TESTING:  Not available or performed    FOLLOW-UP:  All of the issues listed above in the Assessment and Plan were discussed with the patient. All questions were answered. The patient expressed a clear understanding of the above. [facility] in 3 months for ongoing monitoring and treatment. SAVANNAH Swartz-BC  [address] [address] [address] [facility] [address] [address] [place], [address] [place] [address], [facility] 22.  [address] [facility]

## 2023-01-19 DIAGNOSIS — F41.9 ANXIETY: Primary | ICD-10-CM

## 2023-01-19 RX ORDER — CLONAZEPAM 1 MG/1
1 TABLET ORAL 2 TIMES DAILY
Qty: 20 TABLET | Refills: 0 | Status: SHIPPED | OUTPATIENT
Start: 2023-01-19

## 2023-01-20 ENCOUNTER — HOSPITAL ENCOUNTER (OUTPATIENT)
Dept: CT IMAGING | Age: 43
Discharge: HOME | End: 2023-01-20
Attending: INTERNAL MEDICINE
Payer: COMMERCIAL

## 2023-01-20 DIAGNOSIS — Z87.828 HISTORY OF HEAD INJURY: ICD-10-CM

## 2023-01-20 DIAGNOSIS — R51.9 CHRONIC NONINTRACTABLE HEADACHE, UNSPECIFIED HEADACHE TYPE: ICD-10-CM

## 2023-01-20 DIAGNOSIS — G89.29 CHRONIC NONINTRACTABLE HEADACHE, UNSPECIFIED HEADACHE TYPE: ICD-10-CM

## 2023-01-20 PROCEDURE — 70450 CT HEAD/BRAIN W/O DYE: CPT

## 2023-02-06 DIAGNOSIS — F41.9 ANXIETY: ICD-10-CM

## 2023-02-08 RX ORDER — BUSPIRONE HYDROCHLORIDE 7.5 MG/1
7.5 TABLET ORAL 2 TIMES DAILY
Qty: 60 TABLET | Refills: 2 | Status: SHIPPED | OUTPATIENT
Start: 2023-02-08

## 2023-02-28 ENCOUNTER — TELEPHONE (OUTPATIENT)
Dept: HEMATOLOGY | Age: 43
End: 2023-02-28

## 2023-02-28 NOTE — TELEPHONE ENCOUNTER
Patient went in to get labwork, states if there is any labwork that needs to be done sooner, to contact her.

## 2023-03-06 ENCOUNTER — OFFICE VISIT (OUTPATIENT)
Dept: PRIMARY CARE CLINIC | Age: 43
End: 2023-03-06
Payer: COMMERCIAL

## 2023-03-06 ENCOUNTER — HOSPITAL ENCOUNTER (OUTPATIENT)
Dept: GENERAL RADIOLOGY | Age: 43
Discharge: HOME | End: 2023-03-06
Payer: COMMERCIAL

## 2023-03-06 VITALS
OXYGEN SATURATION: 99 % | BODY MASS INDEX: 22.1 KG/M2 | WEIGHT: 140.8 LBS | DIASTOLIC BLOOD PRESSURE: 81 MMHG | HEART RATE: 89 BPM | HEIGHT: 67 IN | RESPIRATION RATE: 18 BRPM | TEMPERATURE: 97.2 F | SYSTOLIC BLOOD PRESSURE: 122 MMHG

## 2023-03-06 DIAGNOSIS — K59.09 OTHER CONSTIPATION: ICD-10-CM

## 2023-03-06 DIAGNOSIS — K59.00 INFREQUENT BOWEL MOVEMENTS: ICD-10-CM

## 2023-03-06 DIAGNOSIS — R63.4 WEIGHT LOSS: ICD-10-CM

## 2023-03-06 DIAGNOSIS — F41.9 ANXIETY: Primary | ICD-10-CM

## 2023-03-06 DIAGNOSIS — J30.1 SEASONAL ALLERGIC RHINITIS DUE TO POLLEN: ICD-10-CM

## 2023-03-06 PROCEDURE — 99214 OFFICE O/P EST MOD 30 MIN: CPT | Performed by: INTERNAL MEDICINE

## 2023-03-06 PROCEDURE — 74018 RADEX ABDOMEN 1 VIEW: CPT

## 2023-03-06 RX ORDER — CETIRIZINE HYDROCHLORIDE 10 MG/1
10 TABLET ORAL
Qty: 90 TABLET | Refills: 1 | Status: SHIPPED | OUTPATIENT
Start: 2023-03-06

## 2023-03-06 RX ORDER — BUSPIRONE HYDROCHLORIDE 15 MG/1
15 TABLET ORAL 2 TIMES DAILY
Qty: 60 TABLET | Refills: 3 | Status: SHIPPED | OUTPATIENT
Start: 2023-03-06

## 2023-03-06 NOTE — PROGRESS NOTES
Chief Complaint   Patient presents with    Anxiety     Follow up         Visit Vitals  /81 (BP 1 Location: Left upper arm, BP Patient Position: Sitting)   Pulse 89   Temp 97.2 °F (36.2 °C) (Temporal)   Resp 18   Ht 5' 7\" (1.702 m)   Wt 140 lb 12.8 oz (63.9 kg)   SpO2 99%   BMI 22.05 kg/m²        Health Maintenance Due   Topic    Shingles Vaccine (1 of 2)    Cervical cancer screen         1. \"Have you been to the ER, urgent care clinic since your last visit? Hospitalized since your last visit? \" No    2. \"Have you seen or consulted any other health care providers outside of the [address] [place] [address] since your last visit? \" No     3. For patients aged 39-70: Has the patient had a colonoscopy / FIT/ Cologuard? NA - based on age      If the patient is female:    4. For patients aged 41-77: Has the patient had a mammogram within the past 2 years? No      5. For patients aged 21-65: Has the patient had a pap smear?  No  Patient aware to

## 2023-03-08 DIAGNOSIS — K21.9 GASTROESOPHAGEAL REFLUX DISEASE WITHOUT ESOPHAGITIS: ICD-10-CM

## 2023-03-08 RX ORDER — OMEPRAZOLE 20 MG/1
CAPSULE, DELAYED RELEASE ORAL
Qty: 30 CAPSULE | Refills: 1 | Status: SHIPPED | OUTPATIENT
Start: 2023-03-08

## 2023-03-18 DIAGNOSIS — K75.4 AUTOIMMUNE HEPATITIS (HCC): ICD-10-CM

## 2023-03-19 RX ORDER — MYCOPHENOLATE MOFETIL 500 MG/1
TABLET ORAL
Qty: 60 TABLET | Refills: 5 | Status: SHIPPED | OUTPATIENT
Start: 2023-03-19
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
## 2023-04-03 DIAGNOSIS — F41.9 ANXIETY: ICD-10-CM

## 2023-04-04 RX ORDER — BUSPIRONE HYDROCHLORIDE 15 MG/1
15 TABLET ORAL 2 TIMES DAILY
Qty: 60 TABLET | Refills: 3 | Status: SHIPPED
Start: 2023-04-04

## 2023-04-05 RX ORDER — OMEPRAZOLE 20 MG/1
CAPSULE, DELAYED RELEASE ORAL
Qty: 30 CAPSULE | Refills: 1 | Status: SHIPPED
Start: 2023-04-05

## 2023-04-06 ENCOUNTER — PATIENT MESSAGE (OUTPATIENT)
Dept: PRIMARY CARE CLINIC | Age: 43
End: 2023-04-06

## 2023-04-06 NOTE — TELEPHONE ENCOUNTER
From: Nahid Nj  To: Oumou Borrero MD  Sent: 4/6/2023 11:27 AM EDT  Subject: New medications    Abilify-2mg once a day in am  Trazadone-50mg -1 tablet a night for sleep  Propranolol 10mg-1 at night for anxiety or heart pains  Just letting you know for my chart/insurance purposes  Thank you,  Kacie

## 2023-04-21 ENCOUNTER — OFFICE VISIT (OUTPATIENT)
Dept: HEMATOLOGY | Age: 43
End: 2023-04-21

## 2023-04-21 VITALS
BODY MASS INDEX: 22.85 KG/M2 | OXYGEN SATURATION: 99 % | SYSTOLIC BLOOD PRESSURE: 121 MMHG | DIASTOLIC BLOOD PRESSURE: 83 MMHG | TEMPERATURE: 98.2 F | WEIGHT: 145.6 LBS | HEIGHT: 67 IN

## 2023-04-21 DIAGNOSIS — K75.4 AUTOIMMUNE HEPATITIS (HCC): Primary | ICD-10-CM

## 2023-04-21 RX ORDER — HYDROXYZINE PAMOATE 25 MG/1
25 CAPSULE ORAL
COMMUNITY

## 2023-04-21 RX ORDER — ARIPIPRAZOLE 5 MG/1
TABLET ORAL DAILY
COMMUNITY

## 2023-04-21 NOTE — PROGRESS NOTES
Identified pt with two pt identifiers(name and ). Reviewed record in preparation for visit and have obtained necessary documentation. Chief Complaint   Patient presents with    Other     3month follow up      Vitals:    23 0944   BP: 121/83   Temp: 98.2 °F (36.8 °C)   TempSrc: Temporal   SpO2: 99%   Weight: 145 lb 9.6 oz (66 kg)   Height: 5' 7\" (1.702 m)   PainSc:   0 - No pain       Health Maintenance Review: Patient reminded of \"due or due soon\" health maintenance. I have asked the patient to contact his/her primary care provider (PCP) for follow-up on his/her health maintenance. Coordination of Care Questionnaire:  :   1) Have you been to an emergency room, urgent care, or hospitalized since your last visit? If yes, where when, and reason for visit? no       2. Have seen or consulted any other health care provider since your last visit? If yes, where when, and reason for visit? NO      Patient is accompanied by self I have received verbal consent from Cecilia Zhou to discuss any/all medical information while they are present in the room.

## 2023-04-21 NOTE — PROGRESS NOTES
[address] [facility], MD, [address], [facility], [facility], PA-C    April S Holly, AGPCNP-BC   Danielle Muñoz, [facility]-AG   Katie Nam, FNP-C  Maggie Barrett, FNP-C   Fred Klein, AGPCNP-BC      [address]   at [address], [address]   [phone]   FAX: [phone]  [facility] [place] of [facility]   at [facility]   [address], [address]   [place], [facility] [address]   [phone]   FAX: [phone]       Patient Care Team:  Rickey Keller MD as PCP - General (Internal Medicine Physician)  Rickey Keller MD as PCP - [facility] Empaneled Provider  Dilcia Herrera MD (Psychiatry)      Problem List  Date Reviewed: 1/16/2023            Codes Class Noted    Gastroesophageal reflux disease without esophagitis ICD-10-CM: K21.9  ICD-9-CM: 530.81  1/16/2023        PTSD (post-traumatic stress disorder) ICD-10-CM: F43.10  ICD-9-CM: 309.81  8/19/2022        Autoimmune hepatitis ([facility] ICD-10-CM: K75.4  ICD-9-CM: 571.42  7/8/2022        Bipolar depression ([facility].) ICD-10-CM: F31.9  ICD-9-CM: 296.50  7/8/2022        Generalized anxiety disorder ICD-10-CM: F41.1  ICD-9-CM: 300.02  7/8/2022        Adenomyomatosis of gallbladder ICD-10-CM: D13.5  ICD-9-CM: 211.5  6/6/2022        Smoking ICD-10-CM: F17.200  ICD-9-CM: 305.1  5/13/2019           Valorie Lu is being seen at [address] for management of Autoimmune Hepatitis. The active problem list, all pertinent past medical history, medications, liver histology, radiologic findings and laboratory findings related to the liver disorder were reviewed and discussed with the patient. The patient is a 38 y.o.   female who was found to have elevated  liver transaminases and   alkaline phosphatase in 2/2022 during a routine physcial. Serologic evaluation for markers of chronic liver disease was strongly positive for ASMA, ANCA. Ultrasound of the liver was performed in 5/2022. The results of the imaging demonstrated a normal appearing liver. A liver biopsy was performed in 6/2022. Dr. Taryn Sunshine reviewed the biopsy slides which demonstrated severe inflammation, moderate piecemeal necrosis. No steatosis. Bridging fibrosis. Consistent with severe AIH. The patient was started on treatment for AIH with prednisone 60 mg daily on 7/08/2022. The prednisone has been slowly tapered and has been on 10 mg daily for the past 3 months. She is tolerating the treatment with no reported side effects. Cellcept was added at a dose of 500 mg twice daily 9/16/2022. The liver enzymes have responded to treatment. Prednisone has been slowly tapered over time. Since the last office visit the patient continued to taper prednisone. She was down to 2.5 mg and the liver enzymes elevated. Advised she increase to 5 mg daily until she returned the following week for a clinic visit and laboratory testing. There have also been changes with mental health medications. The patient has the following symptoms which could be attributed to the liver disorder:  Itching, rash, weakness, poor appetite and fatigue. All of these have now resolved. The patient is not experiencing the following symptoms which are commonly seen in this liver disorder: Pain in the right side over the liver, yellowing of the eyes or skin, swelling of the abdomen, or swelling of the lower extremities. The patient completes all daily activities without any functional limitations. ASSESSMENT AND PLAN:  Autoimmune Hepatitis   The diagnosis was based upon laboratory studies, serology, and liver biopsy. A liver biopsy performed in 6/2022 shows severe inflammation with moderate piecemeal necrosis   and Stage 3 bridging fibrosis.       Have performed laboratory testing to monitor liver function and degree of liver injury. This included BMP, hepatic panel, CBC with platelet count and INR. Laboratory testing from 3/29/2023 reviewed in detail. Follow-up testing ordered today. The liver transaminases and ALP are elevated. The liver function is normal. The platelet count is normal.     Treatment for AIH was started 7/08/2022 with prednisone 60 mg daily. She is currently on 10 mg daily. Cellcept was started at 500 mg twice daily 9/16/2022. Treatment continues to be well tolerated. She slowly tapered prednisone and is currently on 5 mg daily. Will repeat laboratory testing today. Dose of prednisone may need to be increased and recycled if the liver enzymes remain elevated. Mental status changes  The patient reports a head CT is ordered. She is currently followed by psychiatry. Screening for Hepatocellular Carcinoma  HCC screening is not necessary if the patient has no evidence of cirrhosis. Treatment of other medical problems in patients with chronic liver disease  There are no contraindications for the patient to take most medications that are necessary for treatment of other medical issues. The patient can take any medications utilized for treatment of DM. Statins to treat hypercholesterolemia    Normal doses of acetaminophen, as recommended on the label of the bottle, are not hepatotoxic except in the setting of daily alcohol use, even in patients with cirrhosis and can be utilized for pain. Counseling for alcohol in patients with chronic liver disease  The patient does not drink. Vaccinations   Vaccination for viral hepatitis B is recommended since the patient has no serologic evidence of previous exposure or vaccination with immunity. Vaccination for viral hepatitis A is not needed. The patient has serologic evidence of prior exposure or vaccination with immunity. The patient had COVID-19 infection and recovered.       Routine vaccinations against other bacterial and viral agents can be performed as indicated. Annual flu vaccination should be administered if indicated. ALLERGIES  No Known Allergies    MEDICATIONS  Current Outpatient Medications   Medication Sig    hydrOXYzine pamoate (VISTARIL) 25 mg capsule Take 1 Capsule by mouth three (3) times daily as needed. omeprazole (PRILOSEC) 20 mg capsule TAKE 1 CAPSULE BY MOUTH EVERY DAY    ARIPiprazole (ABILIFY) 2 mg tablet Take 1 Tablet by mouth daily. traZODone (DESYREL) 50 mg tablet Take  by mouth nightly. propranoloL (INDERAL) 10 mg tablet Take  by mouth three (3) times daily. busPIRone (BUSPAR) 15 mg tablet Take 1 Tablet by mouth two (2) times a day. mycophenolate (CELLCEPT) 500 mg tablet TAKE 1 TABLET BY MOUTH TWO TIMES A DAY. cetirizine (ZYRTEC) 10 mg tablet Take 1 Tablet by mouth nightly. predniSONE (DELTASONE) 5 mg tablet Take 2 Tablets by mouth daily. lamoTRIgine (LaMICtal) 200 mg tablet 1 Tablet. multivitamin (ONE A DAY) tablet Take 1 Tablet by mouth daily. No current facility-administered medications for this visit. SYSTEM REVIEW NOT RELATED TO LIVER DISEASE OR REVIEWED ABOVE:  Constitution systems: Negative for fever, chills, weight gain, weight loss. Eyes: Negative for visual changes. ENT: Negative for sore throat, painful swallowing. Respiratory: Negative for cough, hemoptysis, SOB. Cardiology: Negative for chest pain, palpitations. GI:  Negative for constipation or diarrhea. : Negative for urinary frequency, dysuria, hematuria, nocturia. Skin: Negative for rash. Hematology: Negative for easy bruising, blood clots. Musculo-skeletal: Negative for back pain, muscle pain, weakness. Neurologic: Negative for headaches, dizziness, vertigo, memory problems not related to HE. Psychology: Negative for anxiety, depression. FAMILY HISTORY:  The father  of MVA, history of DM. The mother diagnosed with autoimmune hepatitis not treated.  Maternal grandmother cholangiocarcinoma. The following family members have liver disease: Mother   There is no family history of immune disorders. SOCIAL HISTORY:  The patient is . The patient has 2 children. The patient currently smokes 3-5 cigarettes daily. The patient does not drink. The patient does not work. PHYSICAL EXAMINATION:  Visit Vitals  /83 (BP 1 Location: Right upper arm, BP Patient Position: Sitting, BP Cuff Size: Adult)   Temp 98.2 °F (36.8 °C) (Temporal)   Ht 5' 7\" (1.702 m)   Wt 145 lb 9.6 oz (66 kg)   SpO2 99%   BMI 22.80 kg/m²       General: No acute distress. Eyes: Sclera anicteric. ENT: No oral lesions. Thyroid normal.  Nodes: No adenopathy. Skin: No spider angiomata. No jaundice. No palmar erythema. Respiratory: Lungs clear to auscultation. Cardiovascular: Regular heart rate. No murmurs. No JVD. Abdomen: Soft non-tender, liver size normal to percussion/palpation. Spleen not palpable. No obvious ascites. Extremities: No edema. No muscle wasting. No gross arthritic changes. Neurologic: Alert and oriented. Cranial nerves grossly intact. No asterixis.     LABORATORY STUDIES:  Liver [place] of [id number] Units 4/21/2023 2/28/2023   WBC 3.6 - 11.0 K/uL 7.1 6.5   ANC 1.8 - 8.0 K/UL 5.4 5.0   HGB 11.5 - 16.0 g/dL 13.6 14.0    - 400 K/uL 300 273   INR 0.9 - 1.2     AST 15 - 37 U/L 147 (H) 42 (H)   ALT 12 - 78 U/L 235 (H) 54   Alk Phos 45 - 117 U/L 150 (H) 70   Bili, Total 0.2 - 1.0 MG/DL 0.5 0.6   Bili, Direct 0.0 - 0.2 MG/DL 0.1 0.1   Albumin 3.5 - 5.0 g/dL 4.2 4.2   BUN 6 - 20 MG/DL 6 6   Creat 0.55 - 1.02 MG/DL 0.67 0.72   Na 136 - 145 mmol/L 138 138   K 3.5 - 5.1 mmol/L 4.1 4.6   Cl 97 - 108 mmol/L 105 108   CO2 21 - 32 mmol/L 27 26   Glucose 65 - 100 mg/dL 104 (H) 104 (H)     From: 3/29/2023   AST/ALT/ALP/T Bili/ALB:82/107/163/0.3/4.6  WBC/HB/PLT/INR:4.9/13.9/291  NA/BUN/CREAT:138/7/0.67    Laboratory testing from 3/29/2023 reviewed in detail. Additional testing included to evaluate progression or regression of disease. Laboratory testing results from today will be communicated by My Chart. SEROLOGIES:  Serologies Latest Ref Rng & Units 4/5/2022   Ferritin 15 - 150 ng/mL 93     Serologies Latest Ref Rng & Units 4/5/2022   M2 Ab 0.0 - 20.0 Units <20.0   LKM 0.0 - 20.0 Units 1.1   Ceruloplasmin 19.0 - 39.0 mg/dL 23.4     Serologies Latest Ref Rng & Units 3/1/2022   Hep C Ab NONREACTIVE   NONREACTIVE     Serologies Latest Ref Rng & Units 7/8/2022   Hep A Ab, Total Negative Positive (A)   Hep B Surface Ag Negative Negative     Serologies Latest Ref Rng & Units 7/8/2022   Hep B Core Ab, Total Negative Negative   Hep B Surface AB QL  Non Reactive     Serologies Latest Ref Rng & Units 7/8/2022   Ferritin 15 - 150 ng/mL 443 (H)   Iron % Saturation 15 - 55 % 61 (H)   ANGEL, IFA  Negative   C-ANCA Neg:<1:20 titer <1:20   P-ANCA Neg:<1:20 titer <1:20   ANCA Neg:<1:20 titer 1:640 (H)   ASMCA 0 - 19 Units 93 (H)   M2 Ab 0.0 - 20.0 Units <20.0   LKM 0.0 - 20.0 Units    Ceruloplasmin 19.0 - 39.0 mg/dL 19.9   Alpha-1 antitrypsin level 101 - 187 mg/dL 195 (H)     LIVER HISTOLOGY:  6/2022. Liver Biopsy reviewed by MLS. Severe inflammation. Moderate piecemeal necrosis. No steatosis. Bridging fibrosis. Knodell score 15 (6,3,3,3). Mahendra 4. Metavir 3. Consistent with severe AIH.     ENDOSCOPIC PROCEDURES:  Not available or performed    RADIOLOGY:  5/2022. Ultrasound of liver. Normal appearing liver. No liver mass lesions. OTHER TESTING:  Not available or performed    FOLLOW-UP:  All of the issues listed above in the Assessment and Plan were discussed with the patient. All questions were answered. The patient expressed a clear understanding of the above. [address] [address] in 3 months for ongoing monitoring and treatment. Will repeat laboratory testing in the next 2-4 weeks and increase prednisone to 20 mg daily. Lakesha Barrera AGPCNP-BC  [facility] [address], [address] [facility], [facility] [address]

## 2023-04-22 LAB
ALBUMIN SERPL-MCNC: 4.2 G/DL (ref 3.5–5)
ALBUMIN/GLOB SERPL: 0.9 (ref 1.1–2.2)
ALP SERPL-CCNC: 150 U/L (ref 45–117)
ALT SERPL-CCNC: 235 U/L (ref 12–78)
ANION GAP SERPL CALC-SCNC: 6 MMOL/L (ref 5–15)
AST SERPL-CCNC: 147 U/L (ref 15–37)
BASOPHILS # BLD: 0 K/UL (ref 0–0.1)
BASOPHILS NFR BLD: 0 % (ref 0–1)
BILIRUB DIRECT SERPL-MCNC: 0.1 MG/DL (ref 0–0.2)
BILIRUB SERPL-MCNC: 0.5 MG/DL (ref 0.2–1)
BUN SERPL-MCNC: 6 MG/DL (ref 6–20)
BUN/CREAT SERPL: 9 (ref 12–20)
CALCIUM SERPL-MCNC: 9.2 MG/DL (ref 8.5–10.1)
CHLORIDE SERPL-SCNC: 105 MMOL/L (ref 97–108)
CO2 SERPL-SCNC: 27 MMOL/L (ref 21–32)
CREAT SERPL-MCNC: 0.67 MG/DL (ref 0.55–1.02)
DIFFERENTIAL METHOD BLD: ABNORMAL
EOSINOPHIL # BLD: 0 K/UL (ref 0–0.4)
EOSINOPHIL NFR BLD: 0 % (ref 0–7)
ERYTHROCYTE [DISTWIDTH] IN BLOOD BY AUTOMATED COUNT: 13.7 % (ref 11.5–14.5)
GLOBULIN SER CALC-MCNC: 4.9 G/DL (ref 2–4)
GLUCOSE SERPL-MCNC: 104 MG/DL (ref 65–100)
HCT VFR BLD AUTO: 43.4 % (ref 35–47)
HGB BLD-MCNC: 13.6 G/DL (ref 11.5–16)
IMM GRANULOCYTES # BLD AUTO: 0 K/UL (ref 0–0.04)
IMM GRANULOCYTES NFR BLD AUTO: 0 % (ref 0–0.5)
LYMPHOCYTES # BLD: 1.4 K/UL (ref 0.8–3.5)
LYMPHOCYTES NFR BLD: 20 % (ref 12–49)
MCH RBC QN AUTO: 29.7 PG (ref 26–34)
MCHC RBC AUTO-ENTMCNC: 31.3 G/DL (ref 30–36.5)
MCV RBC AUTO: 94.8 FL (ref 80–99)
MONOCYTES # BLD: 0.3 K/UL (ref 0–1)
MONOCYTES NFR BLD: 4 % (ref 5–13)
NEUTS SEG # BLD: 5.4 K/UL (ref 1.8–8)
NEUTS SEG NFR BLD: 76 % (ref 32–75)
NRBC # BLD: 0 K/UL (ref 0–0.01)
NRBC BLD-RTO: 0 PER 100 WBC
PLATELET # BLD AUTO: 300 K/UL (ref 150–400)
PMV BLD AUTO: 10.5 FL (ref 8.9–12.9)
POTASSIUM SERPL-SCNC: 4.1 MMOL/L (ref 3.5–5.1)
PROT SERPL-MCNC: 9.1 G/DL (ref 6.4–8.2)
RBC # BLD AUTO: 4.58 M/UL (ref 3.8–5.2)
SODIUM SERPL-SCNC: 138 MMOL/L (ref 136–145)
WBC # BLD AUTO: 7.1 K/UL (ref 3.6–11)

## 2023-04-24 DIAGNOSIS — K75.4 AUTOIMMUNE HEPATITIS (HCC): Primary | ICD-10-CM

## 2023-05-09 LAB
ALBUMIN SERPL-MCNC: 3.7 G/DL (ref 3.5–5)
ALBUMIN/GLOB SERPL: 0.9 (ref 1.1–2.2)
ALP SERPL-CCNC: 70 U/L (ref 45–117)
ALT SERPL-CCNC: 77 U/L (ref 12–78)
AST SERPL-CCNC: 46 U/L (ref 15–37)
BILIRUB DIRECT SERPL-MCNC: 0.2 MG/DL (ref 0–0.2)
BILIRUB SERPL-MCNC: 0.5 MG/DL (ref 0.2–1)
GLOBULIN SER CALC-MCNC: 3.9 G/DL (ref 2–4)
PROT SERPL-MCNC: 7.6 G/DL (ref 6.4–8.2)

## 2023-05-15 ENCOUNTER — CLINICAL DOCUMENTATION (OUTPATIENT)
Age: 43
End: 2023-05-15

## 2023-05-15 DIAGNOSIS — K75.4 AUTOIMMUNE HEPATITIS (HCC): Primary | ICD-10-CM

## 2023-05-18 RX ORDER — ARIPIPRAZOLE 5 MG/1
TABLET ORAL DAILY
COMMUNITY
End: 2023-07-03

## 2023-05-18 RX ORDER — PREDNISONE 5 MG/1
10 TABLET ORAL DAILY
COMMUNITY
Start: 2023-01-16 | End: 2023-05-31 | Stop reason: DRUGHIGH

## 2023-05-18 RX ORDER — HYDROXYZINE PAMOATE 25 MG/1
25 CAPSULE ORAL 3 TIMES DAILY PRN
COMMUNITY
End: 2023-07-03

## 2023-05-18 RX ORDER — BUSPIRONE HYDROCHLORIDE 15 MG/1
15 TABLET ORAL 2 TIMES DAILY
COMMUNITY
Start: 2023-04-04

## 2023-05-18 RX ORDER — TRAZODONE HYDROCHLORIDE 50 MG/1
TABLET ORAL
COMMUNITY
End: 2023-07-03

## 2023-05-18 RX ORDER — OMEPRAZOLE 20 MG/1
1 CAPSULE, DELAYED RELEASE ORAL DAILY
COMMUNITY
Start: 2023-04-07 | End: 2023-07-03

## 2023-05-18 RX ORDER — LAMOTRIGINE 200 MG/1
200 TABLET ORAL
COMMUNITY
Start: 2020-12-15

## 2023-05-18 RX ORDER — PROPRANOLOL HYDROCHLORIDE 10 MG/1
TABLET ORAL 3 TIMES DAILY
COMMUNITY
End: 2023-07-03

## 2023-05-18 RX ORDER — CETIRIZINE HYDROCHLORIDE 10 MG/1
1 TABLET ORAL NIGHTLY
COMMUNITY
Start: 2023-03-06 | End: 2023-07-03

## 2023-05-18 RX ORDER — MYCOPHENOLATE MOFETIL 500 MG/1
1 TABLET ORAL 2 TIMES DAILY
COMMUNITY
Start: 2023-03-19

## 2023-05-26 DIAGNOSIS — K75.4 AUTOIMMUNE HEPATITIS (HCC): ICD-10-CM

## 2023-05-26 LAB
ALBUMIN SERPL-MCNC: 3.9 G/DL (ref 3.5–5)
ALBUMIN/GLOB SERPL: 1.1 (ref 1.1–2.2)
ALP SERPL-CCNC: 51 U/L (ref 45–117)
ALT SERPL-CCNC: 52 U/L (ref 12–78)
AST SERPL-CCNC: 29 U/L (ref 15–37)
BILIRUB DIRECT SERPL-MCNC: <0.1 MG/DL (ref 0–0.2)
BILIRUB SERPL-MCNC: 0.4 MG/DL (ref 0.2–1)
GLOBULIN SER CALC-MCNC: 3.4 G/DL (ref 2–4)
PROT SERPL-MCNC: 7.3 G/DL (ref 6.4–8.2)

## 2023-05-31 DIAGNOSIS — K75.4 AUTOIMMUNE HEPATITIS (HCC): Primary | ICD-10-CM

## 2023-05-31 RX ORDER — PREDNISONE 1 MG/1
15 TABLET ORAL DAILY
Qty: 90 TABLET | Refills: 3 | Status: SHIPPED | OUTPATIENT
Start: 2023-05-31

## 2023-07-03 ENCOUNTER — OFFICE VISIT (OUTPATIENT)
Age: 43
End: 2023-07-03
Payer: COMMERCIAL

## 2023-07-03 VITALS
OXYGEN SATURATION: 100 % | BODY MASS INDEX: 23.86 KG/M2 | TEMPERATURE: 98.2 F | HEIGHT: 67 IN | DIASTOLIC BLOOD PRESSURE: 61 MMHG | HEART RATE: 68 BPM | WEIGHT: 152 LBS | SYSTOLIC BLOOD PRESSURE: 98 MMHG

## 2023-07-03 DIAGNOSIS — F31.9 BIPOLAR DEPRESSION (HCC): ICD-10-CM

## 2023-07-03 DIAGNOSIS — K75.4 AUTOIMMUNE HEPATITIS (HCC): Primary | ICD-10-CM

## 2023-07-03 PROCEDURE — 99214 OFFICE O/P EST MOD 30 MIN: CPT | Performed by: NURSE PRACTITIONER

## 2023-07-03 ASSESSMENT — PATIENT HEALTH QUESTIONNAIRE - PHQ9
SUM OF ALL RESPONSES TO PHQ QUESTIONS 1-9: 0
SUM OF ALL RESPONSES TO PHQ9 QUESTIONS 1 & 2: 0
2. FEELING DOWN, DEPRESSED OR HOPELESS: 0
SUM OF ALL RESPONSES TO PHQ QUESTIONS 1-9: 0
1. LITTLE INTEREST OR PLEASURE IN DOING THINGS: 0

## 2023-07-03 NOTE — PROGRESS NOTES
MD Sabino, FACP, [place], [place]      KAYLEIGH Sagastume, [facility]   Carmine Smallwood, [facility]   Zulma Hernandez, RICHAR Smith FNRODO-C   Guillermo Lozano, [facility]      [address]   at [facility]   [address] [facility], [address] [facility], [address]   [phone]   FAX: [phone]   at [facility], [address] [facility], [address]   [phone]   FAX: [phone]     Patient Care Team:  Saulo Ram MD as PCP - General  Saulo Ram MD as PCP - Empaneled Provider    Patient Active Problem List   Diagnosis    Smoking    Adenomyomatosis of gallbladder    Bipolar depression ([address])    Generalized anxiety disorder    Autoimmune hepatitis ([address])    PTSD (post-traumatic stress disorder)    Gastroesophageal reflux disease without esophagitis       Rachel Bowie is being seen at [address] [place] [address],[address] [facility] for management of Autoimmune Hepatitis. The active problem list, all pertinent past medical history, medications, liver histology, radiologic findings and laboratory findings related to the liver disorder were reviewed and discussed with the patient. The patient is a 38 y.o.  female who was found to have elevated  liver transaminases and   alkaline phosphatase in 2/2022 during a routine physcial.     Serologic evaluation for markers of chronic liver disease was strongly positive for ASMA, ANCA. Ultrasound of the liver was performed in 5/2022. The results of the imaging demonstrated a normal appearing liver. A liver biopsy was performed in 6/2022. Dr. Samy Quijano reviewed the biopsy slides which demonstrated severe inflammation, moderate piecemeal necrosis. No steatosis. Bridging fibrosis. Consistent with severe AIH.      The patient was started on

## 2023-07-04 LAB
ALBUMIN SERPL-MCNC: 4.2 G/DL (ref 3.5–5)
ALBUMIN/GLOB SERPL: 1.1 (ref 1.1–2.2)
ALP SERPL-CCNC: 54 U/L (ref 45–117)
ALT SERPL-CCNC: 43 U/L (ref 12–78)
ANION GAP SERPL CALC-SCNC: 6 MMOL/L (ref 5–15)
AST SERPL-CCNC: 29 U/L (ref 15–37)
BASOPHILS # BLD: 0.1 K/UL (ref 0–0.1)
BASOPHILS NFR BLD: 1 % (ref 0–1)
BILIRUB DIRECT SERPL-MCNC: 0.1 MG/DL (ref 0–0.2)
BILIRUB SERPL-MCNC: 0.4 MG/DL (ref 0.2–1)
BUN SERPL-MCNC: 6 MG/DL (ref 6–20)
BUN/CREAT SERPL: 8 (ref 12–20)
CALCIUM SERPL-MCNC: 10 MG/DL (ref 8.5–10.1)
CHLORIDE SERPL-SCNC: 103 MMOL/L (ref 97–108)
CO2 SERPL-SCNC: 28 MMOL/L (ref 21–32)
CREAT SERPL-MCNC: 0.72 MG/DL (ref 0.55–1.02)
DIFFERENTIAL METHOD BLD: NORMAL
EOSINOPHIL # BLD: 0 K/UL (ref 0–0.4)
EOSINOPHIL NFR BLD: 0 % (ref 0–7)
ERYTHROCYTE [DISTWIDTH] IN BLOOD BY AUTOMATED COUNT: 13.5 % (ref 11.5–14.5)
GLOBULIN SER CALC-MCNC: 3.9 G/DL (ref 2–4)
GLUCOSE SERPL-MCNC: 84 MG/DL (ref 65–100)
HCT VFR BLD AUTO: 45.6 % (ref 35–47)
HGB BLD-MCNC: 14.1 G/DL (ref 11.5–16)
IMM GRANULOCYTES # BLD AUTO: 0 K/UL (ref 0–0.04)
IMM GRANULOCYTES NFR BLD AUTO: 0 % (ref 0–0.5)
LYMPHOCYTES # BLD: 1.5 K/UL (ref 0.8–3.5)
LYMPHOCYTES NFR BLD: 21 % (ref 12–49)
MCH RBC QN AUTO: 30.2 PG (ref 26–34)
MCHC RBC AUTO-ENTMCNC: 30.9 G/DL (ref 30–36.5)
MCV RBC AUTO: 97.6 FL (ref 80–99)
MONOCYTES # BLD: 0.5 K/UL (ref 0–1)
MONOCYTES NFR BLD: 7 % (ref 5–13)
NEUTS SEG # BLD: 4.8 K/UL (ref 1.8–8)
NEUTS SEG NFR BLD: 71 % (ref 32–75)
NRBC # BLD: 0 K/UL (ref 0–0.01)
NRBC BLD-RTO: 0 PER 100 WBC
PLATELET # BLD AUTO: 301 K/UL (ref 150–400)
PMV BLD AUTO: 10.7 FL (ref 8.9–12.9)
POTASSIUM SERPL-SCNC: 4.4 MMOL/L (ref 3.5–5.1)
PROT SERPL-MCNC: 8.1 G/DL (ref 6.4–8.2)
RBC # BLD AUTO: 4.67 M/UL (ref 3.8–5.2)
SODIUM SERPL-SCNC: 137 MMOL/L (ref 136–145)
WBC # BLD AUTO: 6.9 K/UL (ref 3.6–11)

## 2023-07-05 ENCOUNTER — CLINICAL DOCUMENTATION (OUTPATIENT)
Age: 43
End: 2023-07-05

## 2023-07-05 DIAGNOSIS — K75.4 AUTOIMMUNE HEPATITIS (HCC): Primary | ICD-10-CM

## 2023-08-02 DIAGNOSIS — K75.4 AUTOIMMUNE HEPATITIS (HCC): ICD-10-CM

## 2023-08-03 LAB
ALBUMIN SERPL-MCNC: 3.9 G/DL (ref 3.5–5)
ALBUMIN/GLOB SERPL: 1 (ref 1.1–2.2)
ALP SERPL-CCNC: 82 U/L (ref 45–117)
ALT SERPL-CCNC: 94 U/L (ref 12–78)
ANION GAP SERPL CALC-SCNC: 9 MMOL/L (ref 5–15)
AST SERPL-CCNC: 53 U/L (ref 15–37)
BILIRUB DIRECT SERPL-MCNC: <0.1 MG/DL (ref 0–0.2)
BILIRUB SERPL-MCNC: 0.3 MG/DL (ref 0.2–1)
BUN SERPL-MCNC: 10 MG/DL (ref 6–20)
BUN/CREAT SERPL: 12 (ref 12–20)
CALCIUM SERPL-MCNC: 9.5 MG/DL (ref 8.5–10.1)
CHLORIDE SERPL-SCNC: 102 MMOL/L (ref 97–108)
CO2 SERPL-SCNC: 27 MMOL/L (ref 21–32)
COMMENT:: NORMAL
CREAT SERPL-MCNC: 0.84 MG/DL (ref 0.55–1.02)
GLOBULIN SER CALC-MCNC: 3.8 G/DL (ref 2–4)
GLUCOSE SERPL-MCNC: 110 MG/DL (ref 65–100)
POTASSIUM SERPL-SCNC: 4.4 MMOL/L (ref 3.5–5.1)
PROT SERPL-MCNC: 7.7 G/DL (ref 6.4–8.2)
SODIUM SERPL-SCNC: 138 MMOL/L (ref 136–145)
SPECIMEN HOLD: NORMAL

## 2023-08-04 DIAGNOSIS — K75.4 AUTOIMMUNE HEPATITIS (HCC): Primary | ICD-10-CM

## 2023-08-04 RX ORDER — MYCOPHENOLATE MOFETIL 500 MG/1
1000 TABLET ORAL 2 TIMES DAILY
Qty: 60 TABLET | Refills: 3 | Status: SHIPPED | OUTPATIENT
Start: 2023-08-04

## 2023-08-07 ENCOUNTER — CLINICAL DOCUMENTATION (OUTPATIENT)
Age: 43
End: 2023-08-07

## 2023-08-15 DIAGNOSIS — K75.4 AUTOIMMUNE HEPATITIS (HCC): ICD-10-CM

## 2023-08-15 RX ORDER — MYCOPHENOLATE MOFETIL 500 MG/1
TABLET ORAL
Qty: 60 TABLET | Refills: 3 | OUTPATIENT
Start: 2023-08-15

## 2023-08-17 DIAGNOSIS — K75.4 AUTOIMMUNE HEPATITIS (HCC): ICD-10-CM

## 2023-08-17 LAB
COMMENT:: NORMAL
SPECIMEN HOLD: NORMAL

## 2023-08-18 LAB
ALBUMIN SERPL-MCNC: 4 G/DL (ref 3.5–5)
ALBUMIN/GLOB SERPL: 1.1 (ref 1.1–2.2)
ALP SERPL-CCNC: 79 U/L (ref 45–117)
ALT SERPL-CCNC: 72 U/L (ref 12–78)
ANION GAP SERPL CALC-SCNC: 5 MMOL/L (ref 5–15)
AST SERPL-CCNC: 32 U/L (ref 15–37)
BASOPHILS # BLD: 0 K/UL (ref 0–0.1)
BASOPHILS NFR BLD: 0 % (ref 0–1)
BILIRUB DIRECT SERPL-MCNC: 0.1 MG/DL (ref 0–0.2)
BILIRUB SERPL-MCNC: 0.4 MG/DL (ref 0.2–1)
BUN SERPL-MCNC: 9 MG/DL (ref 6–20)
BUN/CREAT SERPL: 12 (ref 12–20)
CALCIUM SERPL-MCNC: 9.4 MG/DL (ref 8.5–10.1)
CHLORIDE SERPL-SCNC: 104 MMOL/L (ref 97–108)
CO2 SERPL-SCNC: 28 MMOL/L (ref 21–32)
CREAT SERPL-MCNC: 0.76 MG/DL (ref 0.55–1.02)
DIFFERENTIAL METHOD BLD: ABNORMAL
EOSINOPHIL # BLD: 0 K/UL (ref 0–0.4)
EOSINOPHIL NFR BLD: 0 % (ref 0–7)
ERYTHROCYTE [DISTWIDTH] IN BLOOD BY AUTOMATED COUNT: 13.5 % (ref 11.5–14.5)
GLOBULIN SER CALC-MCNC: 3.8 G/DL (ref 2–4)
GLUCOSE SERPL-MCNC: 123 MG/DL (ref 65–100)
HCT VFR BLD AUTO: 40 % (ref 35–47)
HGB BLD-MCNC: 12.7 G/DL (ref 11.5–16)
IMM GRANULOCYTES # BLD AUTO: 0 K/UL (ref 0–0.04)
IMM GRANULOCYTES NFR BLD AUTO: 0 % (ref 0–0.5)
LYMPHOCYTES # BLD: 1.8 K/UL (ref 0.8–3.5)
LYMPHOCYTES NFR BLD: 18 % (ref 12–49)
MCH RBC QN AUTO: 29.7 PG (ref 26–34)
MCHC RBC AUTO-ENTMCNC: 31.8 G/DL (ref 30–36.5)
MCV RBC AUTO: 93.5 FL (ref 80–99)
MONOCYTES # BLD: 0.3 K/UL (ref 0–1)
MONOCYTES NFR BLD: 3 % (ref 5–13)
NEUTS SEG # BLD: 7.4 K/UL (ref 1.8–8)
NEUTS SEG NFR BLD: 79 % (ref 32–75)
NRBC # BLD: 0 K/UL (ref 0–0.01)
NRBC BLD-RTO: 0 PER 100 WBC
PLATELET # BLD AUTO: 318 K/UL (ref 150–400)
PMV BLD AUTO: 9.8 FL (ref 8.9–12.9)
POTASSIUM SERPL-SCNC: 4.7 MMOL/L (ref 3.5–5.1)
PROT SERPL-MCNC: 7.8 G/DL (ref 6.4–8.2)
RBC # BLD AUTO: 4.28 M/UL (ref 3.8–5.2)
SODIUM SERPL-SCNC: 137 MMOL/L (ref 136–145)
WBC # BLD AUTO: 9.5 K/UL (ref 3.6–11)

## 2023-08-23 DIAGNOSIS — K75.4 AUTOIMMUNE HEPATITIS (HCC): Primary | ICD-10-CM

## 2023-08-24 ENCOUNTER — CLINICAL DOCUMENTATION (OUTPATIENT)
Age: 43
End: 2023-08-24

## 2023-09-21 DIAGNOSIS — K75.4 AUTOIMMUNE HEPATITIS (HCC): ICD-10-CM

## 2023-09-21 RX ORDER — MYCOPHENOLATE MOFETIL 500 MG/1
1000 TABLET ORAL 2 TIMES DAILY
Qty: 120 TABLET | Refills: 5 | Status: SHIPPED | OUTPATIENT
Start: 2023-09-21 | End: 2023-09-25

## 2023-09-22 LAB
ALBUMIN SERPL-MCNC: 4 G/DL (ref 3.5–5)
ALBUMIN/GLOB SERPL: 1.2 (ref 1.1–2.2)
ALP SERPL-CCNC: 65 U/L (ref 45–117)
ALT SERPL-CCNC: 49 U/L (ref 12–78)
AST SERPL-CCNC: 27 U/L (ref 15–37)
BILIRUB DIRECT SERPL-MCNC: 0.1 MG/DL (ref 0–0.2)
BILIRUB SERPL-MCNC: 0.5 MG/DL (ref 0.2–1)
GLOBULIN SER CALC-MCNC: 3.4 G/DL (ref 2–4)
PROT SERPL-MCNC: 7.4 G/DL (ref 6.4–8.2)

## 2023-09-25 ENCOUNTER — OFFICE VISIT (OUTPATIENT)
Dept: PRIMARY CARE CLINIC | Facility: CLINIC | Age: 43
End: 2023-09-25

## 2023-09-25 ENCOUNTER — CLINICAL DOCUMENTATION (OUTPATIENT)
Age: 43
End: 2023-09-25

## 2023-09-25 VITALS
HEART RATE: 79 BPM | RESPIRATION RATE: 16 BRPM | BODY MASS INDEX: 25.02 KG/M2 | DIASTOLIC BLOOD PRESSURE: 79 MMHG | OXYGEN SATURATION: 96 % | HEIGHT: 67 IN | SYSTOLIC BLOOD PRESSURE: 115 MMHG | WEIGHT: 159.4 LBS | TEMPERATURE: 98 F

## 2023-09-25 DIAGNOSIS — Z00.00 ANNUAL PHYSICAL EXAM: Primary | ICD-10-CM

## 2023-09-25 DIAGNOSIS — R63.5 WEIGHT GAIN: ICD-10-CM

## 2023-09-25 DIAGNOSIS — K29.70 GASTRITIS WITHOUT BLEEDING, UNSPECIFIED CHRONICITY, UNSPECIFIED GASTRITIS TYPE: ICD-10-CM

## 2023-09-25 DIAGNOSIS — Z12.31 ENCOUNTER FOR SCREENING MAMMOGRAM FOR MALIGNANT NEOPLASM OF BREAST: ICD-10-CM

## 2023-09-25 DIAGNOSIS — E55.9 VITAMIN D DEFICIENCY: ICD-10-CM

## 2023-09-25 DIAGNOSIS — Z13.820 SCREENING FOR OSTEOPOROSIS: ICD-10-CM

## 2023-09-25 DIAGNOSIS — K75.4 AUTOIMMUNE HEPATITIS (HCC): ICD-10-CM

## 2023-09-25 DIAGNOSIS — K75.4 AUTOIMMUNE HEPATITIS (HCC): Primary | ICD-10-CM

## 2023-09-25 DIAGNOSIS — Z79.52 CURRENT CHRONIC USE OF SYSTEMIC STEROIDS: ICD-10-CM

## 2023-09-25 RX ORDER — PANTOPRAZOLE SODIUM 40 MG/1
40 TABLET, DELAYED RELEASE ORAL
Qty: 90 TABLET | Refills: 1 | Status: SHIPPED | OUTPATIENT
Start: 2023-09-25

## 2023-09-25 RX ORDER — LORAZEPAM 0.5 MG/1
TABLET ORAL
COMMUNITY
Start: 2023-08-21

## 2023-09-25 RX ORDER — MYCOPHENOLATE MOFETIL 500 MG/1
TABLET ORAL 2 TIMES DAILY
COMMUNITY
End: 2023-10-03 | Stop reason: SDUPTHER

## 2023-09-25 RX ORDER — PREDNISONE 10 MG/1
10 TABLET ORAL DAILY
COMMUNITY

## 2023-09-25 RX ORDER — POLYETHYLENE GLYCOL 3350 17 G/17G
17 POWDER, FOR SOLUTION ORAL DAILY
Qty: 1530 G | Refills: 1 | Status: SHIPPED | OUTPATIENT
Start: 2023-09-25 | End: 2024-03-23

## 2023-09-29 DIAGNOSIS — R63.5 WEIGHT GAIN: ICD-10-CM

## 2023-09-29 DIAGNOSIS — E55.9 VITAMIN D DEFICIENCY: ICD-10-CM

## 2023-09-29 DIAGNOSIS — Z00.00 ANNUAL PHYSICAL EXAM: ICD-10-CM

## 2023-09-30 LAB
25(OH)D3 SERPL-MCNC: 17.4 NG/ML (ref 30–100)
ANION GAP SERPL CALC-SCNC: 6 MMOL/L (ref 5–15)
BUN SERPL-MCNC: 9 MG/DL (ref 6–20)
BUN/CREAT SERPL: 11 (ref 12–20)
CALCIUM SERPL-MCNC: 9.5 MG/DL (ref 8.5–10.1)
CHLORIDE SERPL-SCNC: 106 MMOL/L (ref 97–108)
CHOLEST SERPL-MCNC: 283 MG/DL
CO2 SERPL-SCNC: 26 MMOL/L (ref 21–32)
CREAT SERPL-MCNC: 0.85 MG/DL (ref 0.55–1.02)
ERYTHROCYTE [DISTWIDTH] IN BLOOD BY AUTOMATED COUNT: 12.9 % (ref 11.5–14.5)
EST. AVERAGE GLUCOSE BLD GHB EST-MCNC: 114 MG/DL
GLUCOSE SERPL-MCNC: 118 MG/DL (ref 65–100)
HBA1C MFR BLD: 5.6 % (ref 4–5.6)
HCT VFR BLD AUTO: 41 % (ref 35–47)
HDLC SERPL-MCNC: 78 MG/DL
HDLC SERPL: 3.6 (ref 0–5)
HGB BLD-MCNC: 13 G/DL (ref 11.5–16)
LDLC SERPL CALC-MCNC: 185.8 MG/DL (ref 0–100)
MCH RBC QN AUTO: 30 PG (ref 26–34)
MCHC RBC AUTO-ENTMCNC: 31.7 G/DL (ref 30–36.5)
MCV RBC AUTO: 94.7 FL (ref 80–99)
NRBC # BLD: 0 K/UL (ref 0–0.01)
NRBC BLD-RTO: 0 PER 100 WBC
PLATELET # BLD AUTO: 287 K/UL (ref 150–400)
PMV BLD AUTO: 10.1 FL (ref 8.9–12.9)
POTASSIUM SERPL-SCNC: 4.8 MMOL/L (ref 3.5–5.1)
RBC # BLD AUTO: 4.33 M/UL (ref 3.8–5.2)
SODIUM SERPL-SCNC: 138 MMOL/L (ref 136–145)
T4 FREE SERPL-MCNC: 1 NG/DL (ref 0.8–1.5)
TRIGL SERPL-MCNC: 96 MG/DL
TSH SERPL DL<=0.05 MIU/L-ACNC: 0.42 UIU/ML (ref 0.36–3.74)
VLDLC SERPL CALC-MCNC: 19.2 MG/DL
WBC # BLD AUTO: 8.1 K/UL (ref 3.6–11)

## 2023-10-01 DIAGNOSIS — E55.9 VITAMIN D DEFICIENCY: Primary | ICD-10-CM

## 2023-10-01 RX ORDER — ERGOCALCIFEROL 1.25 MG/1
50000 CAPSULE ORAL WEEKLY
Qty: 12 CAPSULE | Refills: 1 | Status: SHIPPED | OUTPATIENT
Start: 2023-10-01

## 2023-10-02 DIAGNOSIS — E78.00 HYPERCHOLESTEREMIA: Primary | ICD-10-CM

## 2023-10-02 RX ORDER — ATORVASTATIN CALCIUM 10 MG/1
10 TABLET, FILM COATED ORAL DAILY
Qty: 90 TABLET | Refills: 0 | Status: SHIPPED | OUTPATIENT
Start: 2023-10-02

## 2023-10-03 ENCOUNTER — OFFICE VISIT (OUTPATIENT)
Age: 43
End: 2023-10-03
Payer: COMMERCIAL

## 2023-10-03 VITALS
DIASTOLIC BLOOD PRESSURE: 76 MMHG | WEIGHT: 156 LBS | TEMPERATURE: 97 F | BODY MASS INDEX: 24.48 KG/M2 | SYSTOLIC BLOOD PRESSURE: 110 MMHG | HEART RATE: 75 BPM | HEIGHT: 67 IN | OXYGEN SATURATION: 99 %

## 2023-10-03 DIAGNOSIS — E78.00 HYPERCHOLESTEROLEMIA: ICD-10-CM

## 2023-10-03 DIAGNOSIS — K75.4 AUTOIMMUNE HEPATITIS (HCC): Primary | ICD-10-CM

## 2023-10-03 LAB
ALBUMIN SERPL-MCNC: 4.3 G/DL (ref 3.5–5)
ALBUMIN/GLOB SERPL: 1.1 (ref 1.1–2.2)
ALP SERPL-CCNC: 77 U/L (ref 45–117)
ALT SERPL-CCNC: 45 U/L (ref 12–78)
AST SERPL-CCNC: 29 U/L (ref 15–37)
BILIRUB DIRECT SERPL-MCNC: 0.1 MG/DL (ref 0–0.2)
BILIRUB SERPL-MCNC: 1.2 MG/DL (ref 0.2–1)
GLOBULIN SER CALC-MCNC: 3.8 G/DL (ref 2–4)
PROT SERPL-MCNC: 8.1 G/DL (ref 6.4–8.2)

## 2023-10-03 PROCEDURE — 99214 OFFICE O/P EST MOD 30 MIN: CPT | Performed by: NURSE PRACTITIONER

## 2023-10-03 RX ORDER — MYCOPHENOLATE MOFETIL 500 MG/1
1000 TABLET ORAL 2 TIMES DAILY
Qty: 120 TABLET | Refills: 5 | Status: SHIPPED | OUTPATIENT
Start: 2023-10-03

## 2023-10-09 ENCOUNTER — CLINICAL DOCUMENTATION (OUTPATIENT)
Age: 43
End: 2023-10-09

## 2023-10-09 DIAGNOSIS — K75.4 AUTOIMMUNE HEPATITIS (HCC): Primary | ICD-10-CM

## 2023-10-26 ENCOUNTER — HOSPITAL ENCOUNTER (OUTPATIENT)
Facility: HOSPITAL | Age: 43
Discharge: HOME | End: 2023-10-26
Payer: COMMERCIAL

## 2023-10-26 ENCOUNTER — HOSPITAL ENCOUNTER (OUTPATIENT)
Facility: HOSPITAL | Age: 43
End: 2023-10-26
Payer: COMMERCIAL

## 2023-10-26 DIAGNOSIS — Z79.52 CURRENT CHRONIC USE OF SYSTEMIC STEROIDS: ICD-10-CM

## 2023-10-26 DIAGNOSIS — K75.4 AUTOIMMUNE HEPATITIS (HCC): ICD-10-CM

## 2023-10-26 DIAGNOSIS — Z12.31 ENCOUNTER FOR SCREENING MAMMOGRAM FOR MALIGNANT NEOPLASM OF BREAST: ICD-10-CM

## 2023-10-26 DIAGNOSIS — Z13.820 SCREENING FOR OSTEOPOROSIS: ICD-10-CM

## 2023-10-26 PROCEDURE — 77080 DXA BONE DENSITY AXIAL: CPT

## 2023-10-26 PROCEDURE — 77063 BREAST TOMOSYNTHESIS BI: CPT

## 2023-10-27 LAB
ALBUMIN SERPL-MCNC: 4 G/DL (ref 3.5–5)
ALBUMIN/GLOB SERPL: 1.1 (ref 1.1–2.2)
ALP SERPL-CCNC: 88 U/L (ref 45–117)
ALT SERPL-CCNC: 51 U/L (ref 12–78)
AST SERPL-CCNC: 32 U/L (ref 15–37)
BILIRUB DIRECT SERPL-MCNC: <0.1 MG/DL (ref 0–0.2)
BILIRUB SERPL-MCNC: 0.3 MG/DL (ref 0.2–1)
GLOBULIN SER CALC-MCNC: 3.8 G/DL (ref 2–4)
PROT SERPL-MCNC: 7.8 G/DL (ref 6.4–8.2)

## 2023-10-30 ENCOUNTER — NURSE ONLY (OUTPATIENT)
Dept: PRIMARY CARE CLINIC | Facility: CLINIC | Age: 43
End: 2023-10-30
Payer: COMMERCIAL

## 2023-10-30 DIAGNOSIS — Z11.1 TUBERCULOSIS SCREENING: ICD-10-CM

## 2023-10-30 DIAGNOSIS — Z00.00 ANNUAL PHYSICAL EXAM: Primary | ICD-10-CM

## 2023-10-30 PROCEDURE — 86580 TB INTRADERMAL TEST: CPT | Performed by: INTERNAL MEDICINE

## 2023-10-31 DIAGNOSIS — K75.4 AUTOIMMUNE HEPATITIS (HCC): Primary | ICD-10-CM

## 2023-11-01 ENCOUNTER — NURSE ONLY (OUTPATIENT)
Dept: PRIMARY CARE CLINIC | Facility: CLINIC | Age: 43
End: 2023-11-01

## 2023-11-01 ENCOUNTER — CLINICAL DOCUMENTATION (OUTPATIENT)
Age: 43
End: 2023-11-01

## 2023-11-01 ENCOUNTER — TELEPHONE (OUTPATIENT)
Dept: PRIMARY CARE CLINIC | Facility: CLINIC | Age: 43
End: 2023-11-01

## 2023-11-01 LAB
MM INDURATION, POC: 0 MM (ref 0–5)
PPD, POC: NEGATIVE

## 2023-11-01 NOTE — TELEPHONE ENCOUNTER
----- Message from Lilia Hidalgo sent at 10/26/2023  7:47 AM EDT -----  Subject: Message to Provider    QUESTIONS  Information for Provider? Pt called in and want to know if TB test has   come in and pt stated the office was out and waiting on more if someone   can give the pt a call back. ---------------------------------------------------------------------------  --------------  Angel Spann OHDOMINIK  [phone]; OK to leave message on voicemail  ---------------------------------------------------------------------------  --------------  SCRIPT ANSWERS  Relationship to Patient?  Self

## 2023-11-20 DIAGNOSIS — K75.4 AUTOIMMUNE HEPATITIS (HCC): ICD-10-CM

## 2023-11-20 LAB
ALBUMIN SERPL-MCNC: 3.8 G/DL (ref 3.5–5)
ALBUMIN/GLOB SERPL: 0.9 (ref 1.1–2.2)
ALP SERPL-CCNC: 114 U/L (ref 45–117)
ALT SERPL-CCNC: 56 U/L (ref 12–78)
AST SERPL-CCNC: 37 U/L (ref 15–37)
BILIRUB DIRECT SERPL-MCNC: 0.1 MG/DL (ref 0–0.2)
BILIRUB SERPL-MCNC: 0.4 MG/DL (ref 0.2–1)
GLOBULIN SER CALC-MCNC: 4.2 G/DL (ref 2–4)
PROT SERPL-MCNC: 8 G/DL (ref 6.4–8.2)

## 2023-11-21 DIAGNOSIS — K75.4 AUTOIMMUNE HEPATITIS (HCC): Primary | ICD-10-CM

## 2023-11-21 RX ORDER — BUDESONIDE 3 MG/1
9 CAPSULE, COATED PELLETS ORAL DAILY
Qty: 90 CAPSULE | Refills: 5 | Status: SHIPPED | OUTPATIENT
Start: 2023-11-21

## 2023-12-15 DIAGNOSIS — K75.4 AUTOIMMUNE HEPATITIS (HCC): Primary | ICD-10-CM

## 2024-01-08 DIAGNOSIS — K75.4 AUTOIMMUNE HEPATITIS (HCC): ICD-10-CM

## 2024-01-08 LAB
ALBUMIN SERPL-MCNC: 4 G/DL (ref 3.5–5)
ALBUMIN/GLOB SERPL: 1 (ref 1.1–2.2)
ALP SERPL-CCNC: 86 U/L (ref 45–117)
ALT SERPL-CCNC: 46 U/L (ref 12–78)
ANION GAP SERPL CALC-SCNC: 6 MMOL/L (ref 5–15)
AST SERPL-CCNC: 23 U/L (ref 15–37)
BASOPHILS # BLD: 0.1 K/UL (ref 0–0.1)
BASOPHILS NFR BLD: 1 % (ref 0–1)
BILIRUB DIRECT SERPL-MCNC: 0.1 MG/DL (ref 0–0.2)
BILIRUB SERPL-MCNC: 0.4 MG/DL (ref 0.2–1)
BUN SERPL-MCNC: 7 MG/DL (ref 6–20)
BUN/CREAT SERPL: 10 (ref 12–20)
CALCIUM SERPL-MCNC: 9.4 MG/DL (ref 8.5–10.1)
CHLORIDE SERPL-SCNC: 108 MMOL/L (ref 97–108)
CO2 SERPL-SCNC: 24 MMOL/L (ref 21–32)
CREAT SERPL-MCNC: 0.69 MG/DL (ref 0.55–1.02)
DIFFERENTIAL METHOD BLD: NORMAL
EOSINOPHIL # BLD: 0 K/UL (ref 0–0.4)
EOSINOPHIL NFR BLD: 1 % (ref 0–7)
ERYTHROCYTE [DISTWIDTH] IN BLOOD BY AUTOMATED COUNT: 13.2 % (ref 11.5–14.5)
GLOBULIN SER CALC-MCNC: 3.9 G/DL (ref 2–4)
GLUCOSE SERPL-MCNC: 107 MG/DL (ref 65–100)
HCT VFR BLD AUTO: 40.3 % (ref 35–47)
HGB BLD-MCNC: 13.1 G/DL (ref 11.5–16)
IMM GRANULOCYTES # BLD AUTO: 0 K/UL (ref 0–0.04)
IMM GRANULOCYTES NFR BLD AUTO: 0 % (ref 0–0.5)
LYMPHOCYTES # BLD: 1.6 K/UL (ref 0.8–3.5)
LYMPHOCYTES NFR BLD: 25 % (ref 12–49)
MCH RBC QN AUTO: 29.5 PG (ref 26–34)
MCHC RBC AUTO-ENTMCNC: 32.5 G/DL (ref 30–36.5)
MCV RBC AUTO: 90.8 FL (ref 80–99)
MONOCYTES # BLD: 0.5 K/UL (ref 0–1)
MONOCYTES NFR BLD: 7 % (ref 5–13)
NEUTS SEG # BLD: 4.2 K/UL (ref 1.8–8)
NEUTS SEG NFR BLD: 66 % (ref 32–75)
NRBC # BLD: 0 K/UL (ref 0–0.01)
NRBC BLD-RTO: 0 PER 100 WBC
PLATELET # BLD AUTO: 290 K/UL (ref 150–400)
PMV BLD AUTO: 10 FL (ref 8.9–12.9)
POTASSIUM SERPL-SCNC: 4.2 MMOL/L (ref 3.5–5.1)
PROT SERPL-MCNC: 7.9 G/DL (ref 6.4–8.2)
RBC # BLD AUTO: 4.44 M/UL (ref 3.8–5.2)
SODIUM SERPL-SCNC: 138 MMOL/L (ref 136–145)
WBC # BLD AUTO: 6.4 K/UL (ref 3.6–11)

## 2024-01-29 ENCOUNTER — OFFICE VISIT (OUTPATIENT)
Age: 44
End: 2024-01-29
Payer: COMMERCIAL

## 2024-01-29 ENCOUNTER — CLINICAL DOCUMENTATION (OUTPATIENT)
Age: 44
End: 2024-01-29

## 2024-01-29 VITALS
TEMPERATURE: 97 F | WEIGHT: 142 LBS | HEART RATE: 68 BPM | HEIGHT: 67 IN | DIASTOLIC BLOOD PRESSURE: 70 MMHG | SYSTOLIC BLOOD PRESSURE: 116 MMHG | BODY MASS INDEX: 22.29 KG/M2 | OXYGEN SATURATION: 100 %

## 2024-01-29 DIAGNOSIS — K75.4 AUTOIMMUNE HEPATITIS (HCC): Primary | ICD-10-CM

## 2024-01-29 PROCEDURE — 99213 OFFICE O/P EST LOW 20 MIN: CPT | Performed by: NURSE PRACTITIONER

## 2024-01-29 RX ORDER — PROPRANOLOL HYDROCHLORIDE 10 MG/1
10 TABLET ORAL
COMMUNITY
Start: 2024-01-05

## 2024-01-29 NOTE — PROGRESS NOTES
Identified pt with two pt identifiers(name and ). Reviewed record in preparation for visit and have obtained necessary documentation.    Chief Complaint   Patient presents with    Follow-up     /70 (Site: Left Upper Arm, Position: Sitting, Cuff Size: Medium Adult)   Pulse 68   Temp 97 °F (36.1 °C) (Temporal)   Ht 1.702 m (5' 7\")   Wt 64.4 kg (142 lb)   SpO2 100%   BMI 22.24 kg/m²       1. \"Have you been to the ER, urgent care clinic since your last visit?  Hospitalized since your last visit?\" No    2. \"Have you seen or consulted any other health care providers outside of the [facility] since your last visit?\" No     Patient is accompanied by self  I have received verbal consent from Jeannie Lopez to discuss any/all medical information while they are present in the room.

## 2024-01-29 NOTE — PROGRESS NOTES
[facility]      Mandeep Loja MD, FACP, FACG, FAASLD      KAYLEIGH Knott, [facility]   Socorro Ratjoanie, [facility]   Elif Meehan, FNP-C  Mli Bradley, FNP-C   Ashley Berry, [facility]   [address], [address]   [place]  [address] [phone]   FAX: [phone]  [facility]   [address], [address]   [place]  [address] [phone]   FAX: [phone]     Patient Care Team:  Pete Pittman MD as PCP - General  Pete Pittman MD as PCP - Empaneled Provider    Patient Active Problem List   Diagnosis    Smoking    Adenomyomatosis of gallbladder    Bipolar depression (HCC)    Generalized anxiety disorder    Autoimmune hepatitis (HCC)    PTSD (post-traumatic stress disorder)    Gastroesophageal reflux disease without esophagitis    Hypercholesterolemia       Jeannie Lopez is being seen at [facility] for management of Autoimmune Hepatitis. The active problem list, all pertinent past medical history, medications, liver histology, radiologic findings and laboratory findings related to the liver disorder were reviewed and discussed with the patient.      The patient is a 43 y.o.  female who was found to have elevated  liver transaminases and   alkaline phosphatase in 2/2022 during a routine physcial.     Serologic evaluation for markers of chronic liver disease was strongly positive for ASMA, ANCA.    Ultrasound of the liver was performed in 5/2022.  The results of the imaging demonstrated a normal appearing liver.      A liver biopsy was performed in 6/2022.  Dr. Loja reviewed the biopsy slides which demonstrated severe inflammation, moderate piecemeal necrosis. No steatosis. Bridging fibrosis. Consistent with severe AIH.

## 2024-02-22 DIAGNOSIS — E55.9 VITAMIN D DEFICIENCY: ICD-10-CM

## 2024-02-22 RX ORDER — ERGOCALCIFEROL 1.25 MG/1
50000 CAPSULE ORAL WEEKLY
Qty: 12 CAPSULE | Refills: 0 | Status: SHIPPED | OUTPATIENT
Start: 2024-02-22

## 2024-03-09 SDOH — ECONOMIC STABILITY: HOUSING INSECURITY
IN THE LAST 12 MONTHS, WAS THERE A TIME WHEN YOU DID NOT HAVE A STEADY PLACE TO SLEEP OR SLEPT IN A SHELTER (INCLUDING NOW)?: NO

## 2024-03-09 SDOH — ECONOMIC STABILITY: FOOD INSECURITY: WITHIN THE PAST 12 MONTHS, THE FOOD YOU BOUGHT JUST DIDN'T LAST AND YOU DIDN'T HAVE MONEY TO GET MORE.: NEVER TRUE

## 2024-03-09 SDOH — ECONOMIC STABILITY: TRANSPORTATION INSECURITY
IN THE PAST 12 MONTHS, HAS LACK OF TRANSPORTATION KEPT YOU FROM MEETINGS, WORK, OR FROM GETTING THINGS NEEDED FOR DAILY LIVING?: NO

## 2024-03-09 SDOH — ECONOMIC STABILITY: INCOME INSECURITY: HOW HARD IS IT FOR YOU TO PAY FOR THE VERY BASICS LIKE FOOD, HOUSING, MEDICAL CARE, AND HEATING?: NOT HARD AT ALL

## 2024-03-09 SDOH — ECONOMIC STABILITY: FOOD INSECURITY: WITHIN THE PAST 12 MONTHS, YOU WORRIED THAT YOUR FOOD WOULD RUN OUT BEFORE YOU GOT MONEY TO BUY MORE.: NEVER TRUE

## 2024-03-12 ENCOUNTER — OFFICE VISIT (OUTPATIENT)
Dept: PRIMARY CARE CLINIC | Facility: CLINIC | Age: 44
End: 2024-03-12
Payer: COMMERCIAL

## 2024-03-12 VITALS
BODY MASS INDEX: 21.82 KG/M2 | WEIGHT: 139 LBS | DIASTOLIC BLOOD PRESSURE: 85 MMHG | OXYGEN SATURATION: 99 % | HEIGHT: 67 IN | HEART RATE: 68 BPM | SYSTOLIC BLOOD PRESSURE: 128 MMHG | RESPIRATION RATE: 20 BRPM | TEMPERATURE: 98 F

## 2024-03-12 DIAGNOSIS — E55.9 VITAMIN D DEFICIENCY: Primary | ICD-10-CM

## 2024-03-12 DIAGNOSIS — Z12.11 COLON CANCER SCREENING: ICD-10-CM

## 2024-03-12 DIAGNOSIS — E55.9 VITAMIN D DEFICIENCY: ICD-10-CM

## 2024-03-12 DIAGNOSIS — R73.09 ELEVATED GLUCOSE: ICD-10-CM

## 2024-03-12 DIAGNOSIS — K75.4 AUTOIMMUNE HEPATITIS (HCC): ICD-10-CM

## 2024-03-12 DIAGNOSIS — E78.00 HYPERCHOLESTEROLEMIA: ICD-10-CM

## 2024-03-12 DIAGNOSIS — R53.83 OTHER FATIGUE: ICD-10-CM

## 2024-03-12 PROCEDURE — 99214 OFFICE O/P EST MOD 30 MIN: CPT | Performed by: INTERNAL MEDICINE

## 2024-03-12 ASSESSMENT — PATIENT HEALTH QUESTIONNAIRE - PHQ9
10. IF YOU CHECKED OFF ANY PROBLEMS, HOW DIFFICULT HAVE THESE PROBLEMS MADE IT FOR YOU TO DO YOUR WORK, TAKE CARE OF THINGS AT HOME, OR GET ALONG WITH OTHER PEOPLE: 0
SUM OF ALL RESPONSES TO PHQ QUESTIONS 1-9: 0
6. FEELING BAD ABOUT YOURSELF - OR THAT YOU ARE A FAILURE OR HAVE LET YOURSELF OR YOUR FAMILY DOWN: 0
8. MOVING OR SPEAKING SO SLOWLY THAT OTHER PEOPLE COULD HAVE NOTICED. OR THE OPPOSITE, BEING SO FIGETY OR RESTLESS THAT YOU HAVE BEEN MOVING AROUND A LOT MORE THAN USUAL: 0
SUM OF ALL RESPONSES TO PHQ QUESTIONS 1-9: 0
SUM OF ALL RESPONSES TO PHQ QUESTIONS 1-9: 0
2. FEELING DOWN, DEPRESSED OR HOPELESS: 0
5. POOR APPETITE OR OVEREATING: 0
9. THOUGHTS THAT YOU WOULD BE BETTER OFF DEAD, OR OF HURTING YOURSELF: 0
7. TROUBLE CONCENTRATING ON THINGS, SUCH AS READING THE NEWSPAPER OR WATCHING TELEVISION: 0
1. LITTLE INTEREST OR PLEASURE IN DOING THINGS: 0
3. TROUBLE FALLING OR STAYING ASLEEP: 0
SUM OF ALL RESPONSES TO PHQ9 QUESTIONS 1 & 2: 0
4. FEELING TIRED OR HAVING LITTLE ENERGY: 0
SUM OF ALL RESPONSES TO PHQ QUESTIONS 1-9: 0

## 2024-03-12 NOTE — PROGRESS NOTES
see if it improved,     On long term steroids- r/o prediabetes/ DM    Fatigue - rule out DM , hypothyroidism    Anxiety - stable    Explained to patient risk benefits of the medications.Advised patient to stop meds if having any side effects.Pt verbalized understanding of the instructions.    I have discussed the diagnosis with the patient and the intended plan as seen in the above orders.  The patient has received an after-visit summary and questions were answered concerning future plans.  I have discussed medication side effects and warnings with the patient as well. I have reviewed the plan of care with the patient, accepted their input and they are in agreement with the treatment goals.     Reviewed plan of care. Patient has provided input and agrees with goals.    No follow-up provider specified.    Pete Pittman MD

## 2024-03-13 LAB
25(OH)D3 SERPL-MCNC: 88.9 NG/ML (ref 30–100)
CHOLEST SERPL-MCNC: 273 MG/DL
EST. AVERAGE GLUCOSE BLD GHB EST-MCNC: 105 MG/DL
HBA1C MFR BLD: 5.3 % (ref 4–5.6)
HDLC SERPL-MCNC: 72 MG/DL
HDLC SERPL: 3.8 (ref 0–5)
LDLC SERPL CALC-MCNC: 166.4 MG/DL (ref 0–100)
T4 FREE SERPL-MCNC: 0.9 NG/DL (ref 0.8–1.5)
TRIGL SERPL-MCNC: 173 MG/DL
TSH SERPL DL<=0.05 MIU/L-ACNC: 0.77 UIU/ML (ref 0.36–3.74)
VLDLC SERPL CALC-MCNC: 34.6 MG/DL

## 2024-03-19 DIAGNOSIS — E78.00 HYPERCHOLESTEROLEMIA: Primary | ICD-10-CM

## 2024-03-19 RX ORDER — ATORVASTATIN CALCIUM 10 MG/1
10 TABLET, FILM COATED ORAL DAILY
Qty: 90 TABLET | Refills: 1 | Status: SHIPPED | OUTPATIENT
Start: 2024-03-19

## 2024-04-23 DIAGNOSIS — K75.4 AUTOIMMUNE HEPATITIS (HCC): ICD-10-CM

## 2024-04-23 LAB
ALBUMIN SERPL-MCNC: 4 G/DL (ref 3.5–5)
ALBUMIN/GLOB SERPL: 1.1 (ref 1.1–2.2)
ALP SERPL-CCNC: 84 U/L (ref 45–117)
ALT SERPL-CCNC: 37 U/L (ref 12–78)
ANION GAP SERPL CALC-SCNC: 6 MMOL/L (ref 5–15)
AST SERPL-CCNC: 25 U/L (ref 15–37)
BASOPHILS # BLD: 0 K/UL (ref 0–0.1)
BASOPHILS NFR BLD: 1 % (ref 0–1)
BILIRUB DIRECT SERPL-MCNC: 0.2 MG/DL (ref 0–0.2)
BILIRUB SERPL-MCNC: 0.5 MG/DL (ref 0.2–1)
BUN SERPL-MCNC: 6 MG/DL (ref 6–20)
BUN/CREAT SERPL: 8 (ref 12–20)
CALCIUM SERPL-MCNC: 9.5 MG/DL (ref 8.5–10.1)
CHLORIDE SERPL-SCNC: 106 MMOL/L (ref 97–108)
CO2 SERPL-SCNC: 27 MMOL/L (ref 21–32)
CREAT SERPL-MCNC: 0.76 MG/DL (ref 0.55–1.02)
DIFFERENTIAL METHOD BLD: NORMAL
EOSINOPHIL # BLD: 0.1 K/UL (ref 0–0.4)
EOSINOPHIL NFR BLD: 2 % (ref 0–7)
ERYTHROCYTE [DISTWIDTH] IN BLOOD BY AUTOMATED COUNT: 13.4 % (ref 11.5–14.5)
GLOBULIN SER CALC-MCNC: 3.5 G/DL (ref 2–4)
GLUCOSE SERPL-MCNC: 93 MG/DL (ref 65–100)
HCT VFR BLD AUTO: 40 % (ref 35–47)
HGB BLD-MCNC: 12.8 G/DL (ref 11.5–16)
IMM GRANULOCYTES # BLD AUTO: 0 K/UL (ref 0–0.04)
IMM GRANULOCYTES NFR BLD AUTO: 0 % (ref 0–0.5)
LYMPHOCYTES # BLD: 2 K/UL (ref 0.8–3.5)
LYMPHOCYTES NFR BLD: 42 % (ref 12–49)
MCH RBC QN AUTO: 29.8 PG (ref 26–34)
MCHC RBC AUTO-ENTMCNC: 32 G/DL (ref 30–36.5)
MCV RBC AUTO: 93 FL (ref 80–99)
MONOCYTES # BLD: 0.4 K/UL (ref 0–1)
MONOCYTES NFR BLD: 9 % (ref 5–13)
NEUTS SEG # BLD: 2.2 K/UL (ref 1.8–8)
NEUTS SEG NFR BLD: 46 % (ref 32–75)
NRBC # BLD: 0 K/UL (ref 0–0.01)
NRBC BLD-RTO: 0 PER 100 WBC
PLATELET # BLD AUTO: 272 K/UL (ref 150–400)
PMV BLD AUTO: 10.6 FL (ref 8.9–12.9)
POTASSIUM SERPL-SCNC: 3.7 MMOL/L (ref 3.5–5.1)
PROT SERPL-MCNC: 7.5 G/DL (ref 6.4–8.2)
RBC # BLD AUTO: 4.3 M/UL (ref 3.8–5.2)
SODIUM SERPL-SCNC: 139 MMOL/L (ref 136–145)
WBC # BLD AUTO: 4.7 K/UL (ref 3.6–11)

## 2024-05-02 DIAGNOSIS — K75.4 AUTOIMMUNE HEPATITIS (HCC): ICD-10-CM

## 2024-05-02 RX ORDER — MYCOPHENOLATE MOFETIL 500 MG/1
1000 TABLET ORAL 2 TIMES DAILY
Qty: 360 TABLET | Refills: 0 | Status: SHIPPED | OUTPATIENT
Start: 2024-05-02

## 2024-05-09 ENCOUNTER — OFFICE VISIT (OUTPATIENT)
Age: 44
End: 2024-05-09
Payer: COMMERCIAL

## 2024-05-09 VITALS
TEMPERATURE: 97.7 F | OXYGEN SATURATION: 98 % | SYSTOLIC BLOOD PRESSURE: 99 MMHG | WEIGHT: 128.4 LBS | HEIGHT: 67 IN | RESPIRATION RATE: 14 BRPM | BODY MASS INDEX: 20.15 KG/M2 | DIASTOLIC BLOOD PRESSURE: 69 MMHG | HEART RATE: 84 BPM

## 2024-05-09 DIAGNOSIS — K75.4 AUTOIMMUNE HEPATITIS (HCC): Primary | ICD-10-CM

## 2024-05-09 PROCEDURE — 99213 OFFICE O/P EST LOW 20 MIN: CPT | Performed by: NURSE PRACTITIONER

## 2024-05-09 NOTE — PROGRESS NOTES
[facility]      Mandeep Loja MD, FACP, FACG, FAASLD      KAYLEIGH Knott, [facility]-BC   Socorro Ratjoanie, [facility]   Elif Meehan, FNP-C  Mil Bradley, FNP-C   Ashley Berry, [facility]   [address], [address]   [place]  [address] [phone]   FAX: [phone]  [facility]   [address], [address]   [place]  [address] [phone]   FAX: [phone]     Patient Care Team:  Pete Pittman MD as PCP - General  Pete Pittman MD as PCP - Empaneled Provider    Patient Active Problem List   Diagnosis    Smoking    Adenomyomatosis of gallbladder    Bipolar depression (HCC)    Generalized anxiety disorder    Autoimmune hepatitis (HCC)    PTSD (post-traumatic stress disorder)    Gastroesophageal reflux disease without esophagitis    Hypercholesterolemia       Jeannie Lopez is being seen at [facility] for management of Autoimmune Hepatitis. The active problem list, all pertinent past medical history, medications, liver histology, radiologic findings and laboratory findings related to the liver disorder were reviewed and discussed with the patient.      The patient is a 43 y.o.  female who was found to have elevated  liver transaminases and   alkaline phosphatase in 2/2022 during a routine physcial.     Serologic evaluation for markers of chronic liver disease was strongly positive for ASMA, ANCA.    Ultrasound of the liver was performed in 5/2022.  The results of the imaging demonstrated a normal appearing liver.      A liver biopsy was performed in 6/2022.  Dr. Loja reviewed the biopsy slides which demonstrated severe inflammation, moderate piecemeal necrosis. No steatosis. Bridging fibrosis. Consistent with severe AIH.

## 2024-05-09 NOTE — PROGRESS NOTES
Rm    Chief Complaint   Patient presents with    Follow-up     Autoimmune hepatitis         BP 99/69 (Site: Left Upper Arm)   Pulse 84   Temp 97.7 °F (36.5 °C)   Resp 14   Ht 1.702 m (5' 7\")   Wt 58.2 kg (128 lb 6.4 oz)   SpO2 98%   BMI 20.11 kg/m²      1. Have you been to the ER, urgent care clinic since your last visit?  Hospitalized since your last visit? No     2. Have you seen or consulted any other health care providers outside of the [facility] since your last visit?  Include any pap smears or colon screening.  No     Health Maintenance Due   Topic Date Due    Hepatitis B vaccine (1 of 3 - 3-dose series) Never done    Shingles vaccine (1 of 2) Never done    Cervical cancer screen  Never done    DTaP/Tdap/Td vaccine (1 - Tdap) 11/25/2020    Pneumococcal 0-64 years Vaccine (2 of 2 - PCV) 02/23/2023    COVID-19 Vaccine (4 - 2023-24 season) 09/01/2023    Colorectal Cancer Screen  Never done             No data to display                 Failed to redirect to the Timeline version of the [employer] SmartLink.    Failed to redirect to the Timeline version of the [employer] Smart[employer].

## 2024-06-17 DIAGNOSIS — K75.4 AUTOIMMUNE HEPATITIS (HCC): ICD-10-CM

## 2024-06-17 RX ORDER — BUDESONIDE 3 MG/1
9 CAPSULE, COATED PELLETS ORAL DAILY
Qty: 90 CAPSULE | Refills: 5 | Status: SHIPPED | OUTPATIENT
Start: 2024-06-17

## 2024-06-20 ENCOUNTER — APPOINTMENT (OUTPATIENT)
Facility: HOSPITAL | Age: 44
End: 2024-06-20
Payer: COMMERCIAL

## 2024-06-20 ENCOUNTER — CLINICAL DOCUMENTATION (OUTPATIENT)
Age: 44
End: 2024-06-20

## 2024-06-20 ENCOUNTER — HOSPITAL ENCOUNTER (EMERGENCY)
Facility: HOSPITAL | Age: 44
Discharge: HOME | End: 2024-06-20
Attending: STUDENT IN AN ORGANIZED HEALTH CARE EDUCATION/TRAINING PROGRAM
Payer: COMMERCIAL

## 2024-06-20 VITALS
DIASTOLIC BLOOD PRESSURE: 76 MMHG | WEIGHT: 124.12 LBS | HEIGHT: 67 IN | TEMPERATURE: 97.9 F | BODY MASS INDEX: 19.48 KG/M2 | OXYGEN SATURATION: 98 % | SYSTOLIC BLOOD PRESSURE: 115 MMHG | RESPIRATION RATE: 16 BRPM | HEART RATE: 56 BPM

## 2024-06-20 DIAGNOSIS — K75.4 AUTOIMMUNE HEPATITIS (HCC): Primary | ICD-10-CM

## 2024-06-20 DIAGNOSIS — R10.9 ABDOMINAL PAIN, UNSPECIFIED ABDOMINAL LOCATION: ICD-10-CM

## 2024-06-20 DIAGNOSIS — R19.5 DARK STOOLS: Primary | ICD-10-CM

## 2024-06-20 DIAGNOSIS — R11.0 NAUSEA: ICD-10-CM

## 2024-06-20 LAB
ALBUMIN SERPL-MCNC: 4.1 G/DL (ref 3.5–5)
ALBUMIN/GLOB SERPL: 1.1 (ref 1.1–2.2)
ALP SERPL-CCNC: 89 U/L (ref 45–117)
ALT SERPL-CCNC: 54 U/L (ref 12–78)
ANION GAP SERPL CALC-SCNC: 6 MMOL/L (ref 5–15)
APPEARANCE UR: CLEAR
AST SERPL-CCNC: 30 U/L (ref 15–37)
BACTERIA URNS QL MICRO: ABNORMAL /HPF
BASOPHILS # BLD: 0 K/UL (ref 0–0.1)
BASOPHILS NFR BLD: 1 % (ref 0–1)
BILIRUB SERPL-MCNC: 0.5 MG/DL (ref 0.2–1)
BILIRUB UR QL CFM: NEGATIVE
BUN SERPL-MCNC: 7 MG/DL (ref 6–20)
BUN/CREAT SERPL: 9 (ref 12–20)
CALCIUM SERPL-MCNC: 9.2 MG/DL (ref 8.5–10.1)
CHLORIDE SERPL-SCNC: 103 MMOL/L (ref 97–108)
CO2 SERPL-SCNC: 28 MMOL/L (ref 21–32)
COLOR UR: ABNORMAL
CREAT SERPL-MCNC: 0.79 MG/DL (ref 0.55–1.02)
DIFFERENTIAL METHOD BLD: NORMAL
EOSINOPHIL # BLD: 0.1 K/UL (ref 0–0.4)
EOSINOPHIL NFR BLD: 2 % (ref 0–7)
EPITH CASTS URNS QL MICRO: ABNORMAL /LPF
ERYTHROCYTE [DISTWIDTH] IN BLOOD BY AUTOMATED COUNT: 13.2 % (ref 11.5–14.5)
GLOBULIN SER CALC-MCNC: 3.8 G/DL (ref 2–4)
GLUCOSE SERPL-MCNC: 140 MG/DL (ref 65–100)
GLUCOSE UR STRIP.AUTO-MCNC: NEGATIVE MG/DL
HCT VFR BLD AUTO: 38.7 % (ref 35–47)
HEMOCCULT STL QL: NEGATIVE
HGB BLD-MCNC: 12.9 G/DL (ref 11.5–16)
HGB UR QL STRIP: NEGATIVE
IMM GRANULOCYTES # BLD AUTO: 0 K/UL (ref 0–0.04)
IMM GRANULOCYTES NFR BLD AUTO: 0 % (ref 0–0.5)
KETONES UR QL STRIP.AUTO: ABNORMAL MG/DL
LEUKOCYTE ESTERASE UR QL STRIP.AUTO: ABNORMAL
LYMPHOCYTES # BLD: 1.8 K/UL (ref 0.8–3.5)
LYMPHOCYTES NFR BLD: 32 % (ref 12–49)
MCH RBC QN AUTO: 30.1 PG (ref 26–34)
MCHC RBC AUTO-ENTMCNC: 33.3 G/DL (ref 30–36.5)
MCV RBC AUTO: 90.2 FL (ref 80–99)
MONOCYTES # BLD: 0.4 K/UL (ref 0–1)
MONOCYTES NFR BLD: 8 % (ref 5–13)
NEUTS SEG # BLD: 3.4 K/UL (ref 1.8–8)
NEUTS SEG NFR BLD: 57 % (ref 32–75)
NITRITE UR QL STRIP.AUTO: NEGATIVE
NRBC # BLD: 0 K/UL (ref 0–0.01)
NRBC BLD-RTO: 0 PER 100 WBC
PH UR STRIP: 6.5 (ref 5–8)
PLATELET # BLD AUTO: 276 K/UL (ref 150–400)
PMV BLD AUTO: 9.8 FL (ref 8.9–12.9)
POTASSIUM SERPL-SCNC: 3.7 MMOL/L (ref 3.5–5.1)
PROT SERPL-MCNC: 7.9 G/DL (ref 6.4–8.2)
PROT UR STRIP-MCNC: ABNORMAL MG/DL
RBC # BLD AUTO: 4.29 M/UL (ref 3.8–5.2)
RBC #/AREA URNS HPF: ABNORMAL /HPF (ref 0–5)
SODIUM SERPL-SCNC: 137 MMOL/L (ref 136–145)
SP GR UR REFRACTOMETRY: 1.02 (ref 1–1.03)
URINE CULTURE IF INDICATED: ABNORMAL
UROBILINOGEN UR QL STRIP.AUTO: 1 EU/DL (ref 0.2–1)
WBC # BLD AUTO: 5.8 K/UL (ref 3.6–11)
WBC URNS QL MICRO: ABNORMAL /HPF (ref 0–4)

## 2024-06-20 PROCEDURE — 82272 OCCULT BLD FECES 1-3 TESTS: CPT

## 2024-06-20 PROCEDURE — 36415 COLL VENOUS BLD VENIPUNCTURE: CPT

## 2024-06-20 PROCEDURE — 80053 COMPREHEN METABOLIC PANEL: CPT

## 2024-06-20 PROCEDURE — 74177 CT ABD & PELVIS W/CONTRAST: CPT

## 2024-06-20 PROCEDURE — 81001 URINALYSIS AUTO W/SCOPE: CPT

## 2024-06-20 PROCEDURE — 6360000004 HC RX CONTRAST MEDICATION: Performed by: INTERNAL MEDICINE

## 2024-06-20 PROCEDURE — 99285 EMERGENCY DEPT VISIT HI MDM: CPT

## 2024-06-20 PROCEDURE — 85025 COMPLETE CBC W/AUTO DIFF WBC: CPT

## 2024-06-20 RX ADMIN — IOPAMIDOL 100 ML: 755 INJECTION, SOLUTION INTRAVENOUS at 16:59

## 2024-06-20 ASSESSMENT — ENCOUNTER SYMPTOMS
SHORTNESS OF BREATH: 0
COUGH: 0
VOMITING: 0
NAUSEA: 1
DIARRHEA: 0
ABDOMINAL PAIN: 1
RHINORRHEA: 0
EYE DISCHARGE: 0
EYE REDNESS: 0

## 2024-06-20 ASSESSMENT — PAIN SCALES - GENERAL: PAINLEVEL_OUTOF10: 0

## 2024-06-20 NOTE — ED PROVIDER NOTES
(1/11/2023)    Overall Financial Resource Strain (CARDIA)     Difficulty of Paying Living Expenses: Patient declined           PHYSICAL EXAM       ED Triage Vitals [06/20/24 1600]   BP Temp Temp Source Pulse Respirations SpO2 Height Weight - Scale   (!) 141/83 97.9 °F (36.6 °C) Oral 70 18 98 % 1.702 m (5' 7\") 56.3 kg (124 lb 1.9 oz)       Body mass index is 19.44 kg/m².    Physical Exam  Vitals and nursing note reviewed. Exam conducted with a chaperone present.   Constitutional:       General: She is not in acute distress.     Appearance: Normal appearance. She is not ill-appearing or toxic-appearing.   HENT:      Head: Normocephalic and atraumatic.   Eyes:      General: No scleral icterus.        Right eye: No discharge.         Left eye: No discharge.      Conjunctiva/sclera: Conjunctivae normal.   Cardiovascular:      Rate and Rhythm: Normal rate.      Pulses: Normal pulses.   Pulmonary:      Effort: Pulmonary effort is normal. No respiratory distress.   Abdominal:      Tenderness: There is no abdominal tenderness. There is no guarding or rebound.   Genitourinary:     Rectum: Normal. Guaiac result negative.   Musculoskeletal:         General: Normal range of motion.      Cervical back: Normal range of motion.   Skin:     General: Skin is warm and dry.      Capillary Refill: Capillary refill takes less than 2 seconds.   Neurological:      General: No focal deficit present.      Mental Status: She is alert.   Psychiatric:         Mood and Affect: Mood normal.         Behavior: Behavior normal.         DIAGNOSTIC RESULTS     EKG: All EKG's are interpreted by the Emergency Department Physician who either signs or Co-signs this chart in the absence of a cardiologist.         RADIOLOGY:   Non-plain film images such as CT, Ultrasound and MRI are read by the radiologist. Plain radiographic images are visualized and preliminarily interpreted by the emergency physician with the below findings:        Interpretation per the

## 2024-06-20 NOTE — ED TRIAGE NOTES
Pt c/o n/d and abd pain \"for awhile\". Pt also endorses \"dark stools\". Pt has autoimmune hepatitis.

## 2024-06-21 ENCOUNTER — TELEPHONE (OUTPATIENT)
Dept: PRIMARY CARE CLINIC | Facility: CLINIC | Age: 44
End: 2024-06-21

## 2024-06-21 ENCOUNTER — CLINICAL DOCUMENTATION (OUTPATIENT)
Age: 44
End: 2024-06-21

## 2024-06-21 DIAGNOSIS — K75.4 AUTOIMMUNE HEPATITIS (HCC): Primary | ICD-10-CM

## 2024-06-21 DIAGNOSIS — R11.0 NAUSEA: Primary | ICD-10-CM

## 2024-06-21 RX ORDER — ONDANSETRON 4 MG/1
4 TABLET, FILM COATED ORAL DAILY PRN
Qty: 30 TABLET | Refills: 0 | Status: SHIPPED | OUTPATIENT
Start: 2024-06-21

## 2024-06-21 NOTE — TELEPHONE ENCOUNTER
Jeannie Diaz [facility]  Staff1 hour ago (8:53 AM)     CD  Appointment Request From: Jeannie Lopez     With Provider: Pete Pittman MD [[facility]]     Preferred Date Range: Any     Preferred Times: Any Time     Reason for visit: Office Visit     Comments:  Follow up ER visit

## 2024-07-01 ENCOUNTER — OFFICE VISIT (OUTPATIENT)
Dept: PRIMARY CARE CLINIC | Facility: CLINIC | Age: 44
End: 2024-07-01
Payer: COMMERCIAL

## 2024-07-01 VITALS
HEART RATE: 80 BPM | WEIGHT: 125 LBS | OXYGEN SATURATION: 98 % | HEIGHT: 67 IN | TEMPERATURE: 97.8 F | SYSTOLIC BLOOD PRESSURE: 100 MMHG | BODY MASS INDEX: 19.62 KG/M2 | RESPIRATION RATE: 18 BRPM | DIASTOLIC BLOOD PRESSURE: 70 MMHG

## 2024-07-01 DIAGNOSIS — K29.70 GASTRITIS WITHOUT BLEEDING, UNSPECIFIED CHRONICITY, UNSPECIFIED GASTRITIS TYPE: ICD-10-CM

## 2024-07-01 DIAGNOSIS — K75.4 AUTOIMMUNE HEPATITIS (HCC): Primary | ICD-10-CM

## 2024-07-01 DIAGNOSIS — L72.3 SEBACEOUS CYST: ICD-10-CM

## 2024-07-01 PROCEDURE — 99213 OFFICE O/P EST LOW 20 MIN: CPT | Performed by: INTERNAL MEDICINE

## 2024-07-01 NOTE — PROGRESS NOTES
Health Decision Maker has been checked with the patient      AI form was signed    Chief Complaint   Patient presents with    Follow-up     Patient is feeling better.         \"Have you been to the ER, urgent care clinic since your last visit?  Hospitalized since your last visit?\"    YES - When: approximately 2  weeks ago.  Where and Why: June 20th - urgent care.    “Have you seen or consulted any other health care providers outside of [facility] since your last visit?”    NO  Spoke with Liver Specialist ad decreased Cellcept to 1000 daily    There were no vitals filed for this visit.   Depression: Not at risk (3/12/2024)    PHQ-2     PHQ-2 Score: 0       “Have you had a pap smear?”    NO    No cervical cancer screening on file             Click Here for Release of Records Request    Chart reviewed: immunizations are documented.   Immunization History   Administered Date(s) Administered    COVID-19, MODERNA BLUE border, Primary or Immunocompromised, (age 12y+), IM, 100 mcg/0.5mL 05/20/2021, 06/17/2021, 02/16/2022    Pneumococcal, PPSV23, PNEUMOVAX 23, (age 2y+), SC/IM, 0.5mL 02/23/2022    TD 5LF, TENIVAC, (age 7y+), IM, 0.5mL 11/24/2020      
thyromegaly  Chest- CTA, no rhonchi, no crackles  Heart- rrr, no murmurs / gallop/rub  Abdomen- soft,BS+,NT, no hepatosplenomegaly  Ext - no c/c/edema   Neuro- no focal deficits.Power 5/5 all extremities  Skin - warm,dry, no obvious rashes.        Results  Laboratory Studies  Occult stool test was negative. Liver enzymes were normal in April and January.    Imaging  CT abdomen and pelvis was negative.     Review of Data:   LABS:   Lab Results   Component Value Date/Time    WBC 5.8 06/20/2024 04:14 PM    HGB 12.9 06/20/2024 04:14 PM    HCT 38.7 06/20/2024 04:14 PM     06/20/2024 04:14 PM     Lab Results   Component Value Date/Time     06/20/2024 04:14 PM    K 3.7 06/20/2024 04:14 PM     06/20/2024 04:14 PM    CO2 28 06/20/2024 04:14 PM    BUN 7 06/20/2024 04:14 PM     Lab Results   Component Value Date/Time    CHOL 273 03/12/2024 01:13 PM    HDL 72 03/12/2024 01:13 PM    .4 03/12/2024 01:13 PM     No components found for: \"GPT\"        Assessment & Plan  1. Gastritis.  The patient's liver enzymes were within normal limits in 04/2023 and 01/2024. The presence of dark stools and epigastric pain suggests that her symptoms are more indicative of the stomach lining rather than the liver. The clinical presentation suggests gastritis, unrelated to the liver. The lower dose of mycophenolate could potentially be contributing to gastritis. The patient was advised to abstain from NSAIDs such as ibuprofen and diclofenac. She was also advised to cease smoking. Should her symptoms recur, an endoscopy will be considered.    2. Sebaceous cyst on theback  The patient was reassured that the cyst is benign.    Follow-up  The patient is scheduled for a follow-up visit in 09/2024.       1. Autoimmune hepatitis (HCC)  2. Gastritis without bleeding, unspecified chronicity, unspecified gastritis type  3. Sebaceous cyst       Symptoms are suggestive of gastritis, symptoms have improved  Asked pt to avoid smoking,

## 2024-07-03 DIAGNOSIS — K75.4 AUTOIMMUNE HEPATITIS (HCC): ICD-10-CM

## 2024-07-03 LAB
ALBUMIN SERPL-MCNC: 4 G/DL (ref 3.5–5)
ALBUMIN/GLOB SERPL: 1.1 (ref 1.1–2.2)
ALP SERPL-CCNC: 95 U/L (ref 45–117)
ALT SERPL-CCNC: 66 U/L (ref 12–78)
AST SERPL-CCNC: 47 U/L (ref 15–37)
BILIRUB DIRECT SERPL-MCNC: <0.1 MG/DL (ref 0–0.2)
BILIRUB SERPL-MCNC: 0.3 MG/DL (ref 0.2–1)
GLOBULIN SER CALC-MCNC: 3.6 G/DL (ref 2–4)
PROT SERPL-MCNC: 7.6 G/DL (ref 6.4–8.2)

## 2024-07-10 DIAGNOSIS — K75.4 AUTOIMMUNE HEPATITIS (HCC): ICD-10-CM

## 2024-07-10 RX ORDER — MYCOPHENOLATE MOFETIL 500 MG/1
1000 TABLET ORAL 2 TIMES DAILY
Qty: 360 TABLET | Refills: 1 | Status: SHIPPED | OUTPATIENT
Start: 2024-07-10

## 2024-07-15 ENCOUNTER — CLINICAL DOCUMENTATION (OUTPATIENT)
Age: 44
End: 2024-07-15

## 2024-07-15 DIAGNOSIS — K75.4 AUTOIMMUNE HEPATITIS (HCC): Primary | ICD-10-CM

## 2024-08-05 ENCOUNTER — OFFICE VISIT (OUTPATIENT)
Age: 44
End: 2024-08-05
Payer: COMMERCIAL

## 2024-08-05 VITALS
RESPIRATION RATE: 18 BRPM | DIASTOLIC BLOOD PRESSURE: 72 MMHG | BODY MASS INDEX: 19.74 KG/M2 | TEMPERATURE: 97.1 F | OXYGEN SATURATION: 100 % | HEART RATE: 80 BPM | SYSTOLIC BLOOD PRESSURE: 109 MMHG | WEIGHT: 125.8 LBS | HEIGHT: 67 IN

## 2024-08-05 DIAGNOSIS — K75.4 AUTOIMMUNE HEPATITIS (HCC): Primary | ICD-10-CM

## 2024-08-05 PROCEDURE — 99213 OFFICE O/P EST LOW 20 MIN: CPT | Performed by: NURSE PRACTITIONER

## 2024-08-05 RX ORDER — POLYETHYLENE GLYCOL 3350 17 G/DOSE
POWDER (GRAM) ORAL
COMMUNITY
Start: 2024-05-02

## 2024-08-05 ASSESSMENT — PATIENT HEALTH QUESTIONNAIRE - PHQ9
SUM OF ALL RESPONSES TO PHQ QUESTIONS 1-9: 0
SUM OF ALL RESPONSES TO PHQ9 QUESTIONS 1 & 2: 0
SUM OF ALL RESPONSES TO PHQ QUESTIONS 1-9: 0
1. LITTLE INTEREST OR PLEASURE IN DOING THINGS: NOT AT ALL
DEPRESSION UNABLE TO ASSESS: FUNCTIONAL CAPACITY MOTIVATION LIMITS ACCURACY
2. FEELING DOWN, DEPRESSED OR HOPELESS: NOT AT ALL

## 2024-08-05 NOTE — PROGRESS NOTES
Identified pt with two pt identifiers(name and ). Reviewed record in preparation for visit and have obtained necessary documentation.  Vitals:    24 1354   BP: 109/72   Site: Left Upper Arm   Position: Sitting   Cuff Size: Medium Adult   Pulse: 80   Resp: 18   Temp: 97.1 °F (36.2 °C)   TempSrc: Temporal   SpO2: 100%   Weight: 57.1 kg (125 lb 12.8 oz)   Height: 1.702 m (5' 7\")        Health Maintenance Review: Patient reminded of \"due or due soon\" health maintenance. I have asked the patient to contact his/her primary care provider (PCP) for follow-up on his/her health maintenance.    Coordination of Care Questionnaire:  :   1) Have you been to an emergency room, urgent care, or hospitalized since your last visit?  If yes, where when, and reason for visit? no       2. Have seen or consulted any other health care provider since your last visit?   If yes, where when, and reason for visit?  No      Patient is accompanied by self I have received verbal consent from Jeannie Lopez to discuss any/all medical information while they are present in the room.

## 2024-08-05 NOTE — PROGRESS NOTES
[facility]      Mandeep Loja MD, FACP, FACG, FAASLD      KAYLEIGH Knott, [facility]-BC   Socorro Ratjoanie, [facility]   Elif Meehan, FNP-C  Mil Bradley, FNP-C   Ashley Berry, [facility]   [address], [address]   [place]  [address] [phone]   FAX: [phone]  [facility]   [address], [address]   [place]  [address] [phone]   FAX: [phone]     Patient Care Team:  Pete Pittman MD as PCP - General  Pete Pittman MD as PCP - Empaneled Provider    Patient Active Problem List   Diagnosis    Smoking    Adenomyomatosis of gallbladder    Bipolar depression (HCC)    Generalized anxiety disorder    Autoimmune hepatitis (HCC)    PTSD (post-traumatic stress disorder)    Gastroesophageal reflux disease without esophagitis    Hypercholesterolemia       Jeannie Lopez is being seen at [facility] for management of Autoimmune Hepatitis. The active problem list, all pertinent past medical history, medications, liver histology, radiologic findings and laboratory findings related to the liver disorder were reviewed and discussed with the patient.      The patient is a 43 y.o.  female who was found to have elevated  liver transaminases and   alkaline phosphatase in 2/2022 during a routine physcial.     Serologic evaluation for markers of chronic liver disease was strongly positive for ASMA, ANCA.    Ultrasound of the liver was performed in 5/2022.  The results of the imaging demonstrated a normal appearing liver.      A liver biopsy was performed in 6/2022.  Dr. Loja reviewed the biopsy slides which demonstrated severe inflammation, moderate piecemeal necrosis. No steatosis. Bridging fibrosis. Consistent with severe AIH.

## 2024-08-06 LAB
ALBUMIN SERPL-MCNC: 4 G/DL (ref 3.5–5)
ALBUMIN/GLOB SERPL: 1.1 (ref 1.1–2.2)
ALP SERPL-CCNC: 121 U/L (ref 45–117)
ALT SERPL-CCNC: 66 U/L (ref 12–78)
ANION GAP SERPL CALC-SCNC: 3 MMOL/L (ref 5–15)
AST SERPL-CCNC: 45 U/L (ref 15–37)
BASOPHILS # BLD: 0 K/UL (ref 0–0.1)
BASOPHILS NFR BLD: 1 % (ref 0–1)
BILIRUB DIRECT SERPL-MCNC: 0.1 MG/DL (ref 0–0.2)
BILIRUB SERPL-MCNC: 0.2 MG/DL (ref 0.2–1)
BUN SERPL-MCNC: 6 MG/DL (ref 6–20)
BUN/CREAT SERPL: 10 (ref 12–20)
CALCIUM SERPL-MCNC: 9.6 MG/DL (ref 8.5–10.1)
CHLORIDE SERPL-SCNC: 106 MMOL/L (ref 97–108)
CO2 SERPL-SCNC: 30 MMOL/L (ref 21–32)
CREAT SERPL-MCNC: 0.6 MG/DL (ref 0.55–1.02)
DIFFERENTIAL METHOD BLD: NORMAL
EOSINOPHIL # BLD: 0.1 K/UL (ref 0–0.4)
EOSINOPHIL NFR BLD: 3 % (ref 0–7)
ERYTHROCYTE [DISTWIDTH] IN BLOOD BY AUTOMATED COUNT: 13.7 % (ref 11.5–14.5)
GLOBULIN SER CALC-MCNC: 3.7 G/DL (ref 2–4)
GLUCOSE SERPL-MCNC: 91 MG/DL (ref 65–100)
HCT VFR BLD AUTO: 38.9 % (ref 35–47)
HGB BLD-MCNC: 12.5 G/DL (ref 11.5–16)
IMM GRANULOCYTES # BLD AUTO: 0 K/UL (ref 0–0.04)
IMM GRANULOCYTES NFR BLD AUTO: 0 % (ref 0–0.5)
LYMPHOCYTES # BLD: 1.6 K/UL (ref 0.8–3.5)
LYMPHOCYTES NFR BLD: 28 % (ref 12–49)
MCH RBC QN AUTO: 30.3 PG (ref 26–34)
MCHC RBC AUTO-ENTMCNC: 32.1 G/DL (ref 30–36.5)
MCV RBC AUTO: 94.4 FL (ref 80–99)
MONOCYTES # BLD: 0.4 K/UL (ref 0–1)
MONOCYTES NFR BLD: 8 % (ref 5–13)
NEUTS SEG # BLD: 3.3 K/UL (ref 1.8–8)
NEUTS SEG NFR BLD: 60 % (ref 32–75)
NRBC # BLD: 0 K/UL (ref 0–0.01)
NRBC BLD-RTO: 0 PER 100 WBC
PLATELET # BLD AUTO: 256 K/UL (ref 150–400)
PMV BLD AUTO: 10.4 FL (ref 8.9–12.9)
POTASSIUM SERPL-SCNC: 4.3 MMOL/L (ref 3.5–5.1)
PROT SERPL-MCNC: 7.7 G/DL (ref 6.4–8.2)
RBC # BLD AUTO: 4.12 M/UL (ref 3.8–5.2)
SODIUM SERPL-SCNC: 139 MMOL/L (ref 136–145)
WBC # BLD AUTO: 5.5 K/UL (ref 3.6–11)

## 2024-08-12 ENCOUNTER — CLINICAL DOCUMENTATION (OUTPATIENT)
Age: 44
End: 2024-08-12

## 2024-08-12 DIAGNOSIS — K75.4 AUTOIMMUNE HEPATITIS (HCC): Primary | ICD-10-CM

## 2024-08-20 DIAGNOSIS — K75.4 AUTOIMMUNE HEPATITIS (HCC): ICD-10-CM

## 2024-08-20 LAB
ALBUMIN SERPL-MCNC: 4.1 G/DL (ref 3.5–5)
ALBUMIN/GLOB SERPL: 1 (ref 1.1–2.2)
ALP SERPL-CCNC: 110 U/L (ref 45–117)
ALT SERPL-CCNC: 58 U/L (ref 12–78)
AST SERPL-CCNC: 36 U/L (ref 15–37)
BILIRUB DIRECT SERPL-MCNC: <0.1 MG/DL (ref 0–0.2)
BILIRUB SERPL-MCNC: 0.3 MG/DL (ref 0.2–1)
GLOBULIN SER CALC-MCNC: 4 G/DL (ref 2–4)
PROT SERPL-MCNC: 8.1 G/DL (ref 6.4–8.2)

## 2024-08-21 DIAGNOSIS — K75.4 AUTOIMMUNE HEPATITIS (HCC): ICD-10-CM

## 2024-08-21 RX ORDER — MYCOPHENOLATE MOFETIL 500 MG/1
500 TABLET ORAL 2 TIMES DAILY
Qty: 180 TABLET | Refills: 3 | Status: SHIPPED | OUTPATIENT
Start: 2024-08-21

## 2024-09-14 DIAGNOSIS — E78.00 HYPERCHOLESTEROLEMIA: ICD-10-CM

## 2024-09-16 RX ORDER — ATORVASTATIN CALCIUM 10 MG/1
10 TABLET, FILM COATED ORAL DAILY
Qty: 90 TABLET | Refills: 1 | Status: SHIPPED | OUTPATIENT
Start: 2024-09-16

## 2024-10-14 DIAGNOSIS — R11.0 NAUSEA: ICD-10-CM

## 2024-10-14 RX ORDER — ONDANSETRON 4 MG/1
4 TABLET, FILM COATED ORAL DAILY PRN
Qty: 30 TABLET | Refills: 4 | Status: SHIPPED | OUTPATIENT
Start: 2024-10-14

## 2024-10-19 ENCOUNTER — APPOINTMENT (OUTPATIENT)
Facility: HOSPITAL | Age: 44
End: 2024-10-19
Payer: COMMERCIAL

## 2024-10-19 ENCOUNTER — HOSPITAL ENCOUNTER (EMERGENCY)
Facility: HOSPITAL | Age: 44
Discharge: HOME | End: 2024-10-20
Attending: EMERGENCY MEDICINE
Payer: COMMERCIAL

## 2024-10-19 DIAGNOSIS — R07.9 CHEST PAIN, UNSPECIFIED TYPE: Primary | ICD-10-CM

## 2024-10-19 LAB
ALBUMIN SERPL-MCNC: 4.4 G/DL (ref 3.5–5)
ALBUMIN/GLOB SERPL: 1.1 (ref 1.1–2.2)
ALP SERPL-CCNC: 74 U/L (ref 45–117)
ALT SERPL-CCNC: 38 U/L (ref 12–78)
ANION GAP SERPL CALC-SCNC: 9 MMOL/L (ref 2–12)
AST SERPL-CCNC: 30 U/L (ref 15–37)
BASOPHILS # BLD: 0 K/UL (ref 0–0.1)
BASOPHILS NFR BLD: 1 % (ref 0–1)
BILIRUB SERPL-MCNC: 0.6 MG/DL (ref 0.2–1)
BUN SERPL-MCNC: 5 MG/DL (ref 6–20)
BUN/CREAT SERPL: 7 (ref 12–20)
CALCIUM SERPL-MCNC: 9.5 MG/DL (ref 8.5–10.1)
CHLORIDE SERPL-SCNC: 99 MMOL/L (ref 97–108)
CO2 SERPL-SCNC: 28 MMOL/L (ref 21–32)
CREAT SERPL-MCNC: 0.73 MG/DL (ref 0.55–1.02)
DIFFERENTIAL METHOD BLD: NORMAL
EOSINOPHIL # BLD: 0 K/UL (ref 0–0.4)
EOSINOPHIL NFR BLD: 1 % (ref 0–7)
ERYTHROCYTE [DISTWIDTH] IN BLOOD BY AUTOMATED COUNT: 12.8 % (ref 11.5–14.5)
GLOBULIN SER CALC-MCNC: 4 G/DL (ref 2–4)
GLUCOSE SERPL-MCNC: 106 MG/DL (ref 65–100)
HCT VFR BLD AUTO: 39.4 % (ref 35–47)
HGB BLD-MCNC: 13.3 G/DL (ref 11.5–16)
IMM GRANULOCYTES # BLD AUTO: 0 K/UL (ref 0–0.04)
IMM GRANULOCYTES NFR BLD AUTO: 0 % (ref 0–0.5)
LYMPHOCYTES # BLD: 2.1 K/UL (ref 0.8–3.5)
LYMPHOCYTES NFR BLD: 38 % (ref 12–49)
MCH RBC QN AUTO: 30.4 PG (ref 26–34)
MCHC RBC AUTO-ENTMCNC: 33.8 G/DL (ref 30–36.5)
MCV RBC AUTO: 90 FL (ref 80–99)
MONOCYTES # BLD: 0.4 K/UL (ref 0–1)
MONOCYTES NFR BLD: 7 % (ref 5–13)
NEUTS SEG # BLD: 2.9 K/UL (ref 1.8–8)
NEUTS SEG NFR BLD: 53 % (ref 32–75)
NRBC # BLD: 0 K/UL (ref 0–0.01)
NRBC BLD-RTO: 0 PER 100 WBC
PLATELET # BLD AUTO: 259 K/UL (ref 150–400)
PMV BLD AUTO: 9.3 FL (ref 8.9–12.9)
POTASSIUM SERPL-SCNC: 3.4 MMOL/L (ref 3.5–5.1)
PROT SERPL-MCNC: 8.4 G/DL (ref 6.4–8.2)
RBC # BLD AUTO: 4.38 M/UL (ref 3.8–5.2)
SODIUM SERPL-SCNC: 136 MMOL/L (ref 136–145)
TROPONIN I SERPL HS-MCNC: 5 NG/L (ref 0–51)
WBC # BLD AUTO: 5.4 K/UL (ref 3.6–11)

## 2024-10-19 PROCEDURE — 99285 EMERGENCY DEPT VISIT HI MDM: CPT

## 2024-10-19 PROCEDURE — 85025 COMPLETE CBC W/AUTO DIFF WBC: CPT

## 2024-10-19 PROCEDURE — 71046 X-RAY EXAM CHEST 2 VIEWS: CPT

## 2024-10-19 PROCEDURE — 80053 COMPREHEN METABOLIC PANEL: CPT

## 2024-10-19 PROCEDURE — 84484 ASSAY OF TROPONIN QUANT: CPT

## 2024-10-19 PROCEDURE — 93005 ELECTROCARDIOGRAM TRACING: CPT | Performed by: EMERGENCY MEDICINE

## 2024-10-19 PROCEDURE — 36415 COLL VENOUS BLD VENIPUNCTURE: CPT

## 2024-10-19 RX ORDER — BREXPIPRAZOLE 0.5 MG/1
0.5 TABLET ORAL ONCE
COMMUNITY
Start: 2024-10-07

## 2024-10-19 ASSESSMENT — PAIN - FUNCTIONAL ASSESSMENT: PAIN_FUNCTIONAL_ASSESSMENT: NONE - DENIES PAIN

## 2024-10-20 VITALS
DIASTOLIC BLOOD PRESSURE: 66 MMHG | HEIGHT: 66 IN | SYSTOLIC BLOOD PRESSURE: 105 MMHG | OXYGEN SATURATION: 98 % | BODY MASS INDEX: 19.45 KG/M2 | TEMPERATURE: 97.6 F | RESPIRATION RATE: 14 BRPM | HEART RATE: 88 BPM | WEIGHT: 121.03 LBS

## 2024-10-20 LAB
EKG ATRIAL RATE: 86 BPM
EKG DIAGNOSIS: NORMAL
EKG P AXIS: 23 DEGREES
EKG P-R INTERVAL: 114 MS
EKG Q-T INTERVAL: 350 MS
EKG QRS DURATION: 94 MS
EKG QTC CALCULATION (BAZETT): 418 MS
EKG R AXIS: 59 DEGREES
EKG T AXIS: 63 DEGREES
EKG VENTRICULAR RATE: 86 BPM

## 2024-10-20 NOTE — ED NOTES
Patient state that she has pcp, liver, and psych appointments coming up soon, also admits to panic attacks but this does not feel the same.

## 2024-10-20 NOTE — DISCHARGE INSTRUCTIONS
You were seen in the emergency department for chest pain.  The results of your tests were reassuring.  Although an exact cause of your symptoms was not identified, the most likely cause is musculoskeletal pain.  Please follow-up with your PCP or return to the emergency department if you experience a worsening of symptoms or any new symptoms that are concerning to you.

## 2024-10-20 NOTE — ED TRIAGE NOTES
Patient reports CP on and off since June, but in the last few week is worse. The pain travels from right to left side of the chest. Also reports nausea, SOB. Reports Hx of panic attacks.

## 2024-10-20 NOTE — ED PROVIDER NOTES
History     Socioeconomic History    Marital status: Single     Spouse name: None    Number of children: None    Years of education: None    Highest education level: None   Tobacco Use    Smoking status: Every Day     Current packs/day: 0.50     Average packs/day: 0.5 packs/day for 34.7 years (17.3 ttl pk-yrs)     Types: Cigarettes     Start date: 2/23/1990    Smokeless tobacco: Never   Vaping Use    Vaping status: Never Used   Substance and Sexual Activity    Alcohol use: Not Currently    Drug use: No     Social Determinants of Health     Financial Resource Strain: Patient Declined (1/11/2023)    Overall Financial Resource Strain (CARDIA)     Difficulty of Paying Living Expenses: Patient declined           PHYSICAL EXAM    (up to 7 for level 4, 8 or more for level 5)     ED Triage Vitals [10/19/24 2217]   BP Systolic BP Percentile Diastolic BP Percentile Temp Temp Source Pulse Respirations SpO2   (!) 139/91 -- -- 97.6 °F (36.4 °C) Tympanic 88 14 100 %      Height Weight - Scale         1.676 m (5' 6\") 54.9 kg (121 lb 0.5 oz)             Body mass index is 19.54 kg/m².    Physical Exam  Vitals and nursing note reviewed.   Constitutional:       General: She is not in acute distress.     Appearance: Normal appearance. She is not ill-appearing.   HENT:      Head: Normocephalic and atraumatic.      Nose: Nose normal.      Mouth/Throat:      Mouth: Mucous membranes are moist.   Eyes:      Pupils: Pupils are equal, round, and reactive to light.   Cardiovascular:      Rate and Rhythm: Normal rate and regular rhythm.   Pulmonary:      Effort: Pulmonary effort is normal. No respiratory distress.      Breath sounds: Normal breath sounds.   Abdominal:      General: There is no distension.      Palpations: Abdomen is soft.      Tenderness: There is no abdominal tenderness.   Musculoskeletal:      Cervical back: Normal range of motion.   Skin:     General: Skin is warm and dry.   Neurological:      General: No focal deficit

## 2024-10-21 ENCOUNTER — TELEPHONE (OUTPATIENT)
Dept: PRIMARY CARE CLINIC | Facility: CLINIC | Age: 44
End: 2024-10-21

## 2024-10-21 NOTE — TELEPHONE ENCOUNTER
Patient was seen in the ED on Saturday was told to follow up with PCP. Told the patient the appointment she has scheduled for November 13th is the soonest appointment I have with her PCP but I could send a message to the nurse see what can be done.

## 2024-10-22 ASSESSMENT — ENCOUNTER SYMPTOMS
RESPIRATORY NEGATIVE: 1
GASTROINTESTINAL NEGATIVE: 1
EYES NEGATIVE: 1

## 2024-10-30 ENCOUNTER — TELEPHONE (OUTPATIENT)
Dept: PRIMARY CARE CLINIC | Facility: CLINIC | Age: 44
End: 2024-10-30

## 2024-10-30 ENCOUNTER — TRANSCRIBE ORDERS (OUTPATIENT)
Facility: HOSPITAL | Age: 44
End: 2024-10-30

## 2024-10-30 ENCOUNTER — HOSPITAL ENCOUNTER (OUTPATIENT)
Facility: HOSPITAL | Age: 44
Discharge: HOME | End: 2024-11-02
Payer: COMMERCIAL

## 2024-10-30 DIAGNOSIS — Z12.31 OTHER SCREENING MAMMOGRAM: Primary | ICD-10-CM

## 2024-10-30 DIAGNOSIS — Z12.31 OTHER SCREENING MAMMOGRAM: ICD-10-CM

## 2024-10-30 PROCEDURE — 77063 BREAST TOMOSYNTHESIS BI: CPT

## 2024-11-01 ENCOUNTER — OFFICE VISIT (OUTPATIENT)
Age: 44
End: 2024-11-01
Payer: COMMERCIAL

## 2024-11-01 VITALS
HEIGHT: 66 IN | DIASTOLIC BLOOD PRESSURE: 68 MMHG | BODY MASS INDEX: 19.44 KG/M2 | HEART RATE: 69 BPM | OXYGEN SATURATION: 98 % | TEMPERATURE: 97.7 F | SYSTOLIC BLOOD PRESSURE: 101 MMHG | WEIGHT: 121 LBS

## 2024-11-01 DIAGNOSIS — K75.4 AUTOIMMUNE HEPATITIS (HCC): Primary | ICD-10-CM

## 2024-11-01 PROCEDURE — 99214 OFFICE O/P EST MOD 30 MIN: CPT | Performed by: NURSE PRACTITIONER

## 2024-11-01 PROCEDURE — 99213 OFFICE O/P EST LOW 20 MIN: CPT | Performed by: NURSE PRACTITIONER

## 2024-11-01 PROCEDURE — 91200 LIVER ELASTOGRAPHY: CPT | Performed by: NURSE PRACTITIONER

## 2024-11-01 ASSESSMENT — ANXIETY QUESTIONNAIRES
IF YOU CHECKED OFF ANY PROBLEMS ON THIS QUESTIONNAIRE, HOW DIFFICULT HAVE THESE PROBLEMS MADE IT FOR YOU TO DO YOUR WORK, TAKE CARE OF THINGS AT HOME, OR GET ALONG WITH OTHER PEOPLE: NOT DIFFICULT AT ALL
5. BEING SO RESTLESS THAT IT IS HARD TO SIT STILL: NOT AT ALL
6. BECOMING EASILY ANNOYED OR IRRITABLE: NOT AT ALL
GAD7 TOTAL SCORE: 0
2. NOT BEING ABLE TO STOP OR CONTROL WORRYING: NOT AT ALL
7. FEELING AFRAID AS IF SOMETHING AWFUL MIGHT HAPPEN: NOT AT ALL
3. WORRYING TOO MUCH ABOUT DIFFERENT THINGS: NOT AT ALL
1. FEELING NERVOUS, ANXIOUS, OR ON EDGE: NOT AT ALL
4. TROUBLE RELAXING: NOT AT ALL

## 2024-11-01 ASSESSMENT — PATIENT HEALTH QUESTIONNAIRE - PHQ9
SUM OF ALL RESPONSES TO PHQ QUESTIONS 1-9: 0
3. TROUBLE FALLING OR STAYING ASLEEP: NOT AT ALL
2. FEELING DOWN, DEPRESSED OR HOPELESS: NOT AT ALL
SUM OF ALL RESPONSES TO PHQ QUESTIONS 1-9: 0
10. IF YOU CHECKED OFF ANY PROBLEMS, HOW DIFFICULT HAVE THESE PROBLEMS MADE IT FOR YOU TO DO YOUR WORK, TAKE CARE OF THINGS AT HOME, OR GET ALONG WITH OTHER PEOPLE: NOT DIFFICULT AT ALL
6. FEELING BAD ABOUT YOURSELF - OR THAT YOU ARE A FAILURE OR HAVE LET YOURSELF OR YOUR FAMILY DOWN: NOT AT ALL
5. POOR APPETITE OR OVEREATING: NOT AT ALL
SUM OF ALL RESPONSES TO PHQ9 QUESTIONS 1 & 2: 0
4. FEELING TIRED OR HAVING LITTLE ENERGY: NOT AT ALL
8. MOVING OR SPEAKING SO SLOWLY THAT OTHER PEOPLE COULD HAVE NOTICED. OR THE OPPOSITE, BEING SO FIGETY OR RESTLESS THAT YOU HAVE BEEN MOVING AROUND A LOT MORE THAN USUAL: NOT AT ALL
1. LITTLE INTEREST OR PLEASURE IN DOING THINGS: NOT AT ALL
DEPRESSION UNABLE TO ASSESS: FUNCTIONAL CAPACITY MOTIVATION LIMITS ACCURACY
SUM OF ALL RESPONSES TO PHQ QUESTIONS 1-9: 0
7. TROUBLE CONCENTRATING ON THINGS, SUCH AS READING THE NEWSPAPER OR WATCHING TELEVISION: NOT AT ALL
9. THOUGHTS THAT YOU WOULD BE BETTER OFF DEAD, OR OF HURTING YOURSELF: NOT AT ALL
SUM OF ALL RESPONSES TO PHQ QUESTIONS 1-9: 0

## 2024-11-01 NOTE — PROGRESS NOTES
Chief Complaint   Patient presents with    Follow-up     Vitals:    11/01/24 0847   BP: 101/68   Pulse: 69   Temp: 97.7 °F (36.5 °C)   SpO2: 98%     \"Have you been to the ER, urgent care clinic since your last visit?  Hospitalized since your last visit?\"    Yes for chest pain-[facility] [place]    “Have you seen or consulted any other health care providers outside our system since your last visit?”    NO     “Have you had a pap smear?”    NO    No cervical cancer screening on file              
Ultrasound of liver.  Normal appearing liver.  No liver mass lesions.    OTHER TESTING:  Not available or performed    FOLLOW-UP:  All of the issues listed above in the Assessment and Plan were discussed with the patient.  All questions were answered.  The patient expressed a clear understanding of the above.    Follow-up [facility] in 3 months for ongoing monitoring and treatment.    Ashley Telles AGPCNP-BC  [address], [address]  [place]  [address] [phone]  [facility]

## 2024-11-13 ENCOUNTER — OFFICE VISIT (OUTPATIENT)
Dept: PRIMARY CARE CLINIC | Facility: CLINIC | Age: 44
End: 2024-11-13
Payer: COMMERCIAL

## 2024-11-13 VITALS
HEIGHT: 66 IN | HEART RATE: 68 BPM | TEMPERATURE: 98.6 F | RESPIRATION RATE: 18 BRPM | BODY MASS INDEX: 19.93 KG/M2 | SYSTOLIC BLOOD PRESSURE: 101 MMHG | DIASTOLIC BLOOD PRESSURE: 67 MMHG | OXYGEN SATURATION: 100 % | WEIGHT: 124 LBS

## 2024-11-13 DIAGNOSIS — F41.9 ANXIETY DISORDER, UNSPECIFIED TYPE: ICD-10-CM

## 2024-11-13 DIAGNOSIS — K75.4 AUTOIMMUNE HEPATITIS (HCC): Primary | ICD-10-CM

## 2024-11-13 DIAGNOSIS — E78.00 HYPERCHOLESTEROLEMIA: ICD-10-CM

## 2024-11-13 PROCEDURE — 99213 OFFICE O/P EST LOW 20 MIN: CPT | Performed by: INTERNAL MEDICINE

## 2024-11-13 ASSESSMENT — PATIENT HEALTH QUESTIONNAIRE - PHQ9
SUM OF ALL RESPONSES TO PHQ QUESTIONS 1-9: 15
5. POOR APPETITE OR OVEREATING: SEVERAL DAYS
7. TROUBLE CONCENTRATING ON THINGS, SUCH AS READING THE NEWSPAPER OR WATCHING TELEVISION: NEARLY EVERY DAY
2. FEELING DOWN, DEPRESSED OR HOPELESS: NOT AT ALL
3. TROUBLE FALLING OR STAYING ASLEEP: NEARLY EVERY DAY
SUM OF ALL RESPONSES TO PHQ QUESTIONS 1-9: 15
9. THOUGHTS THAT YOU WOULD BE BETTER OFF DEAD, OR OF HURTING YOURSELF: NOT AT ALL
4. FEELING TIRED OR HAVING LITTLE ENERGY: NEARLY EVERY DAY
SUM OF ALL RESPONSES TO PHQ9 QUESTIONS 1 & 2: 1
SUM OF ALL RESPONSES TO PHQ QUESTIONS 1-9: 15
10. IF YOU CHECKED OFF ANY PROBLEMS, HOW DIFFICULT HAVE THESE PROBLEMS MADE IT FOR YOU TO DO YOUR WORK, TAKE CARE OF THINGS AT HOME, OR GET ALONG WITH OTHER PEOPLE: NOT DIFFICULT AT ALL
SUM OF ALL RESPONSES TO PHQ QUESTIONS 1-9: 15
6. FEELING BAD ABOUT YOURSELF - OR THAT YOU ARE A FAILURE OR HAVE LET YOURSELF OR YOUR FAMILY DOWN: SEVERAL DAYS
8. MOVING OR SPEAKING SO SLOWLY THAT OTHER PEOPLE COULD HAVE NOTICED. OR THE OPPOSITE, BEING SO FIGETY OR RESTLESS THAT YOU HAVE BEEN MOVING AROUND A LOT MORE THAN USUAL: NEARLY EVERY DAY
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
## 2024-11-13 NOTE — PROGRESS NOTES
\"Have you been to the ER, urgent care clinic since your last visit?  Hospitalized since your last visit?\"    YES - When: approximately 1 months ago.  Where and Why: [facility]    “Have you seen or consulted any other health care providers outside our system since your last visit?”    NO     “Have you had a pap smear?”    YES - Where: [facility]   Nurse/JESSICA to request most recent records if not in the chart    No cervical cancer screening on file

## 2024-11-15 ENCOUNTER — TELEPHONE (OUTPATIENT)
Dept: PRIMARY CARE CLINIC | Facility: CLINIC | Age: 44
End: 2024-11-15

## 2024-11-15 NOTE — TELEPHONE ENCOUNTER
Pt walked in and dropped off some lab work results from [employer] that Dr. SCHULTZ had requested. Results has been scanned into her  and placed in the nurses inbox

## 2024-11-19 DIAGNOSIS — E78.00 HYPERCHOLESTEROLEMIA: ICD-10-CM

## 2024-11-19 LAB
ALBUMIN SERPL-MCNC: 3.9 G/DL (ref 3.5–5)
ALBUMIN/GLOB SERPL: 1.1 (ref 1.1–2.2)
ALP SERPL-CCNC: 71 U/L (ref 45–117)
ALT SERPL-CCNC: 51 U/L (ref 12–78)
ANION GAP SERPL CALC-SCNC: 6 MMOL/L (ref 2–12)
AST SERPL-CCNC: 33 U/L (ref 15–37)
BILIRUB SERPL-MCNC: 0.5 MG/DL (ref 0.2–1)
BUN SERPL-MCNC: 8 MG/DL (ref 6–20)
BUN/CREAT SERPL: 12 (ref 12–20)
CALCIUM SERPL-MCNC: 9 MG/DL (ref 8.5–10.1)
CHLORIDE SERPL-SCNC: 107 MMOL/L (ref 97–108)
CHOLEST SERPL-MCNC: 187 MG/DL
CO2 SERPL-SCNC: 26 MMOL/L (ref 21–32)
CREAT SERPL-MCNC: 0.66 MG/DL (ref 0.55–1.02)
GLOBULIN SER CALC-MCNC: 3.6 G/DL (ref 2–4)
GLUCOSE SERPL-MCNC: 121 MG/DL (ref 65–100)
HDLC SERPL-MCNC: 82 MG/DL
HDLC SERPL: 2.3 (ref 0–5)
LDLC SERPL CALC-MCNC: 83.4 MG/DL (ref 0–100)
POTASSIUM SERPL-SCNC: 3.8 MMOL/L (ref 3.5–5.1)
PROT SERPL-MCNC: 7.5 G/DL (ref 6.4–8.2)
SODIUM SERPL-SCNC: 139 MMOL/L (ref 136–145)
TRIGL SERPL-MCNC: 108 MG/DL
VLDLC SERPL CALC-MCNC: 21.6 MG/DL

## 2024-12-20 NOTE — TELEPHONE ENCOUNTER
PCP: Pete Pittman MD    Last Visit 11/13/2024   Future Appointments   Date Time Provider Department Center   3/6/2025 11:00 AM Ashley Telles, APRN - NP [facility]       Requested Prescriptions     Pending Prescriptions Disp Refills    cetirizine (ZYRTEC) 10 MG tablet [Pharmacy Med Name: CETIRIZINE HCL 10 MG TABLET] 30 tablet 5     Sig: TAKE 1 TABLET BY MOUTH NIGHTLY         Other Comments: Last Refill

## 2024-12-21 RX ORDER — CETIRIZINE HYDROCHLORIDE 10 MG/1
10 TABLET ORAL NIGHTLY
Qty: 90 TABLET | Refills: 3 | Status: SHIPPED | OUTPATIENT
Start: 2024-12-21

## 2025-01-07 DIAGNOSIS — K75.4 AUTOIMMUNE HEPATITIS (HCC): ICD-10-CM

## 2025-01-07 DIAGNOSIS — R11.0 NAUSEA: ICD-10-CM

## 2025-01-07 RX ORDER — ONDANSETRON 4 MG/1
4 TABLET, FILM COATED ORAL DAILY PRN
Qty: 30 TABLET | Refills: 4 | Status: SHIPPED | OUTPATIENT
Start: 2025-01-07

## 2025-01-07 RX ORDER — BUDESONIDE 3 MG/1
9 CAPSULE, COATED PELLETS ORAL DAILY
Qty: 90 CAPSULE | Refills: 5 | OUTPATIENT
Start: 2025-01-07

## 2025-01-07 RX ORDER — BUDESONIDE 3 MG/1
9 CAPSULE, COATED PELLETS ORAL DAILY
Qty: 90 CAPSULE | Refills: 5 | Status: SHIPPED | OUTPATIENT
Start: 2025-01-07

## 2025-01-14 RX ORDER — POLYETHYLENE GLYCOL 3350 17 G/DOSE
POWDER (GRAM) ORAL
Qty: 510 G | Refills: 5 | Status: SHIPPED | OUTPATIENT
Start: 2025-01-14

## 2025-03-06 ENCOUNTER — OFFICE VISIT (OUTPATIENT)
Age: 45
End: 2025-03-06
Payer: COMMERCIAL

## 2025-03-06 VITALS
HEART RATE: 84 BPM | TEMPERATURE: 97.4 F | WEIGHT: 121 LBS | BODY MASS INDEX: 19.44 KG/M2 | SYSTOLIC BLOOD PRESSURE: 121 MMHG | DIASTOLIC BLOOD PRESSURE: 86 MMHG | OXYGEN SATURATION: 99 % | HEIGHT: 66 IN

## 2025-03-06 DIAGNOSIS — K75.4 AUTOIMMUNE HEPATITIS (HCC): Primary | ICD-10-CM

## 2025-03-06 PROCEDURE — 99214 OFFICE O/P EST MOD 30 MIN: CPT | Performed by: NURSE PRACTITIONER

## 2025-03-06 RX ORDER — AZATHIOPRINE 50 MG/1
50 TABLET ORAL DAILY
Qty: 30 TABLET | Refills: 5 | Status: SHIPPED | OUTPATIENT
Start: 2025-03-06

## 2025-03-06 ASSESSMENT — PATIENT HEALTH QUESTIONNAIRE - PHQ9
6. FEELING BAD ABOUT YOURSELF - OR THAT YOU ARE A FAILURE OR HAVE LET YOURSELF OR YOUR FAMILY DOWN: SEVERAL DAYS
4. FEELING TIRED OR HAVING LITTLE ENERGY: SEVERAL DAYS
7. TROUBLE CONCENTRATING ON THINGS, SUCH AS READING THE NEWSPAPER OR WATCHING TELEVISION: SEVERAL DAYS
DEPRESSION UNABLE TO ASSESS: FUNCTIONAL CAPACITY MOTIVATION LIMITS ACCURACY
9. THOUGHTS THAT YOU WOULD BE BETTER OFF DEAD, OR OF HURTING YOURSELF: SEVERAL DAYS
5. POOR APPETITE OR OVEREATING: SEVERAL DAYS
3. TROUBLE FALLING OR STAYING ASLEEP: SEVERAL DAYS
SUM OF ALL RESPONSES TO PHQ QUESTIONS 1-9: 8
8. MOVING OR SPEAKING SO SLOWLY THAT OTHER PEOPLE COULD HAVE NOTICED. OR THE OPPOSITE, BEING SO FIGETY OR RESTLESS THAT YOU HAVE BEEN MOVING AROUND A LOT MORE THAN USUAL: SEVERAL DAYS
SUM OF ALL RESPONSES TO PHQ QUESTIONS 1-9: 9
SUM OF ALL RESPONSES TO PHQ QUESTIONS 1-9: 9
2. FEELING DOWN, DEPRESSED OR HOPELESS: SEVERAL DAYS
10. IF YOU CHECKED OFF ANY PROBLEMS, HOW DIFFICULT HAVE THESE PROBLEMS MADE IT FOR YOU TO DO YOUR WORK, TAKE CARE OF THINGS AT HOME, OR GET ALONG WITH OTHER PEOPLE: NOT DIFFICULT AT ALL
SUM OF ALL RESPONSES TO PHQ QUESTIONS 1-9: 9
1. LITTLE INTEREST OR PLEASURE IN DOING THINGS: SEVERAL DAYS

## 2025-03-06 ASSESSMENT — ANXIETY QUESTIONNAIRES
GAD7 TOTAL SCORE: 21
4. TROUBLE RELAXING: NEARLY EVERY DAY
5. BEING SO RESTLESS THAT IT IS HARD TO SIT STILL: NEARLY EVERY DAY
6. BECOMING EASILY ANNOYED OR IRRITABLE: NEARLY EVERY DAY
7. FEELING AFRAID AS IF SOMETHING AWFUL MIGHT HAPPEN: NEARLY EVERY DAY
3. WORRYING TOO MUCH ABOUT DIFFERENT THINGS: NEARLY EVERY DAY
1. FEELING NERVOUS, ANXIOUS, OR ON EDGE: NEARLY EVERY DAY
IF YOU CHECKED OFF ANY PROBLEMS ON THIS QUESTIONNAIRE, HOW DIFFICULT HAVE THESE PROBLEMS MADE IT FOR YOU TO DO YOUR WORK, TAKE CARE OF THINGS AT HOME, OR GET ALONG WITH OTHER PEOPLE: VERY DIFFICULT
2. NOT BEING ABLE TO STOP OR CONTROL WORRYING: NEARLY EVERY DAY

## 2025-03-06 ASSESSMENT — COLUMBIA-SUICIDE SEVERITY RATING SCALE - C-SSRS
1. WITHIN THE PAST MONTH, HAVE YOU WISHED YOU WERE DEAD OR WISHED YOU COULD GO TO SLEEP AND NOT WAKE UP?: NO
2. HAVE YOU ACTUALLY HAD ANY THOUGHTS OF KILLING YOURSELF?: NO
6. HAVE YOU EVER DONE ANYTHING, STARTED TO DO ANYTHING, OR PREPARED TO DO ANYTHING TO END YOUR LIFE?: NO

## 2025-03-06 NOTE — PROGRESS NOTES
Chief Complaint   Patient presents with    Follow-up     Vitals:    03/06/25 1041   BP: 121/86   Site: Left Upper Arm   Position: Sitting   Pulse: 84   Temp: 97.4 °F (36.3 °C)   TempSrc: Temporal   SpO2: 99%   Weight: 54.9 kg (121 lb)   Height: 1.676 m (5' 6\")     .  \"Have you been to the ER, urgent care clinic since your last visit?  Hospitalized since your last visit?\"    NO    “Have you seen or consulted any other health care providers outside of [facility] since your last visit?”    NO     “Have you had a pap smear?”    NO    No cervical cancer screening on file             Click Here for Release of Records Request    
twice daily 9/16/2022. Prednisone was slowly tapered to 2.5 mg until she had a flare with an acute elevation in liver enzymes 4/2023. Prednisone increased to 20 mg and cellcept increased to 1000 mg twice daily 8/2023. Unable to taper Prednisone therefore Budesonide 9 mg was started 11/2023. Liver enzymes increased 6/2024. Cellcept caused severe GI upset, dose decreased to 500 mg twice daily 6/2024.  The liver enzymes are now normal on budesonide 9 mg, CellCept 500 mg twice daily.  The liver enzymes remain normal.  Budesonide decreased to 6 mg daily and CellCept remained at 500 mg twice daily.     She continues to have ongoing side effects which may be related to CellCept.  Discussed pros and cons of changing to another medication and she would like to proceed with this.  Will discontinue CellCept and start azathioprine 50 mg daily.  Liver enzymes will be repeated in 2 weeks.  Explained she may need to restart prednisone and discontinue budesonide if a flare occurs.    Bipolar  Medications have been changed and she is now only taking Lamotrigine and Buspar.     Screening for Hepatocellular Carcinoma  HCC screening is not necessary if the patient has no evidence of cirrhosis.    Treatment of other medical problems in patients with chronic liver disease  There are no contraindications for the patient to take most medications that are necessary for treatment of other medical issues.    The patient can take any medications utilized for treatment of DM. Statins to treat hypercholesterolemia    Normal doses of acetaminophen, as recommended on the label of the bottle, are not hepatotoxic except in the setting of daily alcohol use, even in patients with cirrhosis and can be utilized for pain.    Counseling for alcohol in patients with chronic liver disease  The patient does not drink.     Vaccinations   Routine vaccinations against other bacterial and viral agents can be performed as indicated.  Annual flu vaccination should

## 2025-03-07 LAB
ALBUMIN SERPL-MCNC: 4.3 G/DL (ref 3.5–5)
ALBUMIN SERPL-MCNC: 4.5 G/DL (ref 3.5–5)
ALBUMIN/GLOB SERPL: 1 (ref 1.1–2.2)
ALBUMIN/GLOB SERPL: 1.2 (ref 1.1–2.2)
ALP SERPL-CCNC: 64 U/L (ref 45–117)
ALP SERPL-CCNC: 66 U/L (ref 45–117)
ALT SERPL-CCNC: 46 U/L (ref 12–78)
ALT SERPL-CCNC: 46 U/L (ref 12–78)
ANION GAP SERPL CALC-SCNC: 7 MMOL/L (ref 2–12)
AST SERPL-CCNC: 30 U/L (ref 15–37)
AST SERPL-CCNC: 33 U/L (ref 15–37)
BASOPHILS # BLD: 0.03 K/UL (ref 0–0.1)
BASOPHILS NFR BLD: 0.4 % (ref 0–1)
BILIRUB DIRECT SERPL-MCNC: 0.2 MG/DL (ref 0–0.2)
BILIRUB SERPL-MCNC: 0.5 MG/DL (ref 0.2–1)
BILIRUB SERPL-MCNC: 0.5 MG/DL (ref 0.2–1)
BUN SERPL-MCNC: 6 MG/DL (ref 6–20)
BUN/CREAT SERPL: 9 (ref 12–20)
CALCIUM SERPL-MCNC: 9.9 MG/DL (ref 8.5–10.1)
CHLORIDE SERPL-SCNC: 105 MMOL/L (ref 97–108)
CO2 SERPL-SCNC: 24 MMOL/L (ref 21–32)
CREAT SERPL-MCNC: 0.69 MG/DL (ref 0.55–1.02)
DIFFERENTIAL METHOD BLD: NORMAL
EOSINOPHIL # BLD: 0.04 K/UL (ref 0–0.4)
EOSINOPHIL NFR BLD: 0.6 % (ref 0–7)
ERYTHROCYTE [DISTWIDTH] IN BLOOD BY AUTOMATED COUNT: 13 % (ref 11.5–14.5)
GLOBULIN SER CALC-MCNC: 3.8 G/DL (ref 2–4)
GLOBULIN SER CALC-MCNC: 4.1 G/DL (ref 2–4)
GLUCOSE SERPL-MCNC: 105 MG/DL (ref 65–100)
HCT VFR BLD AUTO: 41.1 % (ref 35–47)
HGB BLD-MCNC: 13.6 G/DL (ref 11.5–16)
IMM GRANULOCYTES # BLD AUTO: 0.02 K/UL (ref 0–0.04)
IMM GRANULOCYTES NFR BLD AUTO: 0.3 % (ref 0–0.5)
LYMPHOCYTES # BLD: 1.88 K/UL (ref 0.8–3.5)
LYMPHOCYTES NFR BLD: 28.1 % (ref 12–49)
MCH RBC QN AUTO: 30.5 PG (ref 26–34)
MCHC RBC AUTO-ENTMCNC: 33.1 G/DL (ref 30–36.5)
MCV RBC AUTO: 92.2 FL (ref 80–99)
MONOCYTES # BLD: 0.57 K/UL (ref 0–1)
MONOCYTES NFR BLD: 8.5 % (ref 5–13)
NEUTS SEG # BLD: 4.15 K/UL (ref 1.8–8)
NEUTS SEG NFR BLD: 62.1 % (ref 32–75)
NRBC # BLD: 0 K/UL (ref 0–0.01)
NRBC BLD-RTO: 0 PER 100 WBC
PLATELET # BLD AUTO: 264 K/UL (ref 150–400)
PMV BLD AUTO: 10.7 FL (ref 8.9–12.9)
POTASSIUM SERPL-SCNC: 4.3 MMOL/L (ref 3.5–5.1)
PROT SERPL-MCNC: 8.3 G/DL (ref 6.4–8.2)
PROT SERPL-MCNC: 8.4 G/DL (ref 6.4–8.2)
RBC # BLD AUTO: 4.46 M/UL (ref 3.8–5.2)
SODIUM SERPL-SCNC: 136 MMOL/L (ref 136–145)
WBC # BLD AUTO: 6.7 K/UL (ref 3.6–11)

## 2025-03-09 ENCOUNTER — RESULTS FOLLOW-UP (OUTPATIENT)
Age: 45
End: 2025-03-09

## 2025-03-11 DIAGNOSIS — E78.00 HYPERCHOLESTEROLEMIA: ICD-10-CM

## 2025-03-11 RX ORDER — ATORVASTATIN CALCIUM 10 MG/1
10 TABLET, FILM COATED ORAL DAILY
Qty: 90 TABLET | Refills: 1 | Status: SHIPPED | OUTPATIENT
Start: 2025-03-11

## 2025-03-11 NOTE — TELEPHONE ENCOUNTER
PCP: Pete Pittman MD    Last Visit 11/13/2024   Future Appointments   Date Time Provider Department Center   6/10/2025  1:00 PM Ashley Telles APRN - NP [facility]       Requested Prescriptions     Pending Prescriptions Disp Refills    atorvastatin (LIPITOR) 10 MG tablet [Pharmacy Med Name: ATORVASTATIN 10 MG TABLET] 90 tablet 1     Sig: TAKE 1 TABLET BY MOUTH EVERY DAY         Other Comments: Last Refill PCP: Pete Pittman MD    Last Visit 11/13/2024   Future Appointments   Date Time Provider Department Center   6/10/2025  1:00 PM Ashley Telles APRN - NP [facility]       Requested Prescriptions     Pending Prescriptions Disp Refills    atorvastatin (LIPITOR) 10 MG tablet [Pharmacy Med Name: ATORVASTATIN 10 MG TABLET] 90 tablet 1     Sig: TAKE 1 TABLET BY MOUTH EVERY DAY         Other Comments: Last Refill

## 2025-03-17 DIAGNOSIS — K75.4 AUTOIMMUNE HEPATITIS (HCC): Primary | ICD-10-CM

## 2025-03-22 LAB
ALBUMIN SERPL-MCNC: 4.4 G/DL (ref 3.9–4.9)
ALP SERPL-CCNC: 61 IU/L (ref 44–121)
ALT SERPL-CCNC: 31 IU/L (ref 0–32)
AST SERPL-CCNC: 29 IU/L (ref 0–40)
BILIRUB SERPL-MCNC: 0.3 MG/DL (ref 0–1.2)
BUN SERPL-MCNC: 8 MG/DL (ref 6–24)
BUN/CREAT SERPL: 14 (ref 9–23)
CALCIUM SERPL-MCNC: 9.4 MG/DL (ref 8.7–10.2)
CHLORIDE SERPL-SCNC: 103 MMOL/L (ref 96–106)
CO2 SERPL-SCNC: 25 MMOL/L (ref 20–29)
CREAT SERPL-MCNC: 0.58 MG/DL (ref 0.57–1)
EGFRCR SERPLBLD CKD-EPI 2021: 113 ML/MIN/1.73
GLOBULIN SER CALC-MCNC: 2.9 G/DL (ref 1.5–4.5)
GLUCOSE SERPL-MCNC: 80 MG/DL (ref 70–99)
POTASSIUM SERPL-SCNC: 3.8 MMOL/L (ref 3.5–5.2)
PROT SERPL-MCNC: 7.3 G/DL (ref 6–8.5)
SODIUM SERPL-SCNC: 141 MMOL/L (ref 134–144)

## 2025-03-26 ENCOUNTER — RESULTS FOLLOW-UP (OUTPATIENT)
Age: 45
End: 2025-03-26

## 2025-03-26 DIAGNOSIS — K75.4 AUTOIMMUNE HEPATITIS (HCC): Primary | ICD-10-CM

## 2025-03-28 DIAGNOSIS — K75.4 AUTOIMMUNE HEPATITIS (HCC): ICD-10-CM

## 2025-03-30 RX ORDER — AZATHIOPRINE 50 MG/1
50 TABLET ORAL DAILY
Qty: 90 TABLET | Refills: 2 | Status: SHIPPED | OUTPATIENT
Start: 2025-03-30

## 2025-04-17 DIAGNOSIS — K75.4 AUTOIMMUNE HEPATITIS (HCC): ICD-10-CM

## 2025-04-18 ENCOUNTER — RESULTS FOLLOW-UP (OUTPATIENT)
Age: 45
End: 2025-04-18

## 2025-04-18 DIAGNOSIS — K75.4 AUTOIMMUNE HEPATITIS (HCC): Primary | ICD-10-CM

## 2025-04-18 LAB
ALBUMIN SERPL-MCNC: 4.2 G/DL (ref 3.9–4.9)
ALP SERPL-CCNC: 58 IU/L (ref 44–121)
ALT SERPL-CCNC: 22 IU/L (ref 0–32)
AST SERPL-CCNC: 26 IU/L (ref 0–40)
BILIRUB DIRECT SERPL-MCNC: 0.14 MG/DL (ref 0–0.4)
BILIRUB SERPL-MCNC: 0.4 MG/DL (ref 0–1.2)
PROT SERPL-MCNC: 6.9 G/DL (ref 6–8.5)

## 2025-06-06 DIAGNOSIS — K75.4 AUTOIMMUNE HEPATITIS (HCC): ICD-10-CM

## 2025-06-06 LAB
ALBUMIN SERPL-MCNC: 4.2 G/DL (ref 3.5–5)
ALBUMIN/GLOB SERPL: 1.2 (ref 1.1–2.2)
ALP SERPL-CCNC: 51 U/L (ref 45–117)
ALT SERPL-CCNC: 43 U/L (ref 12–78)
ANION GAP SERPL CALC-SCNC: 7 MMOL/L (ref 2–12)
AST SERPL-CCNC: 33 U/L (ref 15–37)
BASOPHILS # BLD: 0.03 K/UL (ref 0–0.1)
BASOPHILS NFR BLD: 0.7 % (ref 0–1)
BILIRUB DIRECT SERPL-MCNC: 0.1 MG/DL (ref 0–0.2)
BILIRUB SERPL-MCNC: 0.5 MG/DL (ref 0.2–1)
BUN SERPL-MCNC: 9 MG/DL (ref 6–20)
BUN/CREAT SERPL: 13 (ref 12–20)
CALCIUM SERPL-MCNC: 9.7 MG/DL (ref 8.5–10.1)
CHLORIDE SERPL-SCNC: 99 MMOL/L (ref 97–108)
CO2 SERPL-SCNC: 28 MMOL/L (ref 21–32)
CREAT SERPL-MCNC: 0.67 MG/DL (ref 0.55–1.02)
DIFFERENTIAL METHOD BLD: ABNORMAL
EOSINOPHIL # BLD: 0.05 K/UL (ref 0–0.4)
EOSINOPHIL NFR BLD: 1.2 % (ref 0–7)
ERYTHROCYTE [DISTWIDTH] IN BLOOD BY AUTOMATED COUNT: 14.9 % (ref 11.5–14.5)
GLOBULIN SER CALC-MCNC: 3.5 G/DL (ref 2–4)
GLUCOSE SERPL-MCNC: 107 MG/DL (ref 65–100)
HCT VFR BLD AUTO: 36.6 % (ref 35–47)
HGB BLD-MCNC: 12.2 G/DL (ref 11.5–16)
IMM GRANULOCYTES # BLD AUTO: 0.01 K/UL (ref 0–0.04)
IMM GRANULOCYTES NFR BLD AUTO: 0.2 % (ref 0–0.5)
LYMPHOCYTES # BLD: 1.68 K/UL (ref 0.8–3.5)
LYMPHOCYTES NFR BLD: 39 % (ref 12–49)
MCH RBC QN AUTO: 31.5 PG (ref 26–34)
MCHC RBC AUTO-ENTMCNC: 33.3 G/DL (ref 30–36.5)
MCV RBC AUTO: 94.6 FL (ref 80–99)
MONOCYTES # BLD: 0.36 K/UL (ref 0–1)
MONOCYTES NFR BLD: 8.4 % (ref 5–13)
NEUTS SEG # BLD: 2.18 K/UL (ref 1.8–8)
NEUTS SEG NFR BLD: 50.5 % (ref 32–75)
NRBC # BLD: 0 K/UL (ref 0–0.01)
NRBC BLD-RTO: 0 PER 100 WBC
PLATELET # BLD AUTO: 269 K/UL (ref 150–400)
PMV BLD AUTO: 10.2 FL (ref 8.9–12.9)
POTASSIUM SERPL-SCNC: 4.4 MMOL/L (ref 3.5–5.1)
PROT SERPL-MCNC: 7.7 G/DL (ref 6.4–8.2)
RBC # BLD AUTO: 3.87 M/UL (ref 3.8–5.2)
SODIUM SERPL-SCNC: 134 MMOL/L (ref 136–145)
WBC # BLD AUTO: 4.3 K/UL (ref 3.6–11)

## 2025-06-10 ENCOUNTER — RESULTS FOLLOW-UP (OUTPATIENT)
Age: 45
End: 2025-06-10

## 2025-06-10 ENCOUNTER — OFFICE VISIT (OUTPATIENT)
Age: 45
End: 2025-06-10
Payer: COMMERCIAL

## 2025-06-10 VITALS
RESPIRATION RATE: 16 BRPM | WEIGHT: 116.8 LBS | HEIGHT: 66 IN | HEART RATE: 65 BPM | DIASTOLIC BLOOD PRESSURE: 77 MMHG | BODY MASS INDEX: 18.77 KG/M2 | SYSTOLIC BLOOD PRESSURE: 117 MMHG

## 2025-06-10 DIAGNOSIS — K75.4 AUTOIMMUNE HEPATITIS (HCC): Primary | ICD-10-CM

## 2025-06-10 PROCEDURE — 99213 OFFICE O/P EST LOW 20 MIN: CPT | Performed by: NURSE PRACTITIONER

## 2025-06-10 RX ORDER — AZATHIOPRINE 50 MG/1
100 TABLET ORAL DAILY
Qty: 60 TABLET | Refills: 5 | Status: SHIPPED | OUTPATIENT
Start: 2025-06-10

## 2025-06-10 RX ORDER — CLONAZEPAM 0.5 MG/1
0.5 TABLET ORAL 3 TIMES DAILY PRN
COMMUNITY
Start: 2025-04-29

## 2025-06-10 RX ORDER — ALPRAZOLAM 0.5 MG
TABLET ORAL
COMMUNITY
Start: 2025-04-29

## 2025-06-10 NOTE — PROGRESS NOTES
[facility]      Mandeep Loja MD, FACP, FACG, FAASLD      KAYLEIGH Knott, [facility]-BC   Socorro Ratjoanie, [facility]   Elif Meehan, FNP-C  Mil Bradley, FNP-C   Ashley Berry, [facility]   [address], [address]   [place]  [address] [phone]   FAX: [phone]  [facility]   [address], [address]   [place]  [address] [phone]   FAX: [phone]     Patient Care Team:  Pete Pittman MD as PCP - General  Pete Pittman MD as PCP - Empaneled Provider    Patient Active Problem List   Diagnosis    Smoking    Adenomyomatosis of gallbladder    Bipolar depression (HCC)    Generalized anxiety disorder    Autoimmune hepatitis (HCC)    PTSD (post-traumatic stress disorder)    Gastroesophageal reflux disease without esophagitis    Hypercholesterolemia       Jeannie Lopez is being seen at [facility] for management of Autoimmune Hepatitis. The active problem list, all pertinent past medical history, medications, liver histology, radiologic findings and laboratory findings related to the liver disorder were reviewed and discussed with the patient.      The patient is a 44 y.o.  female who was found to have elevated  liver transaminases and   alkaline phosphatase in 2/2022 during a routine physcial.     Serologic evaluation for markers of chronic liver disease was strongly positive for ASMA, ANCA.    Ultrasound of the liver was performed in 5/2022.  The results of the imaging demonstrated a normal appearing liver.      A liver biopsy was performed in 6/2022.  Dr. Loja reviewed the biopsy slides which demonstrated severe inflammation, moderate piecemeal necrosis. No steatosis. Bridging fibrosis. Consistent with severe AIH.

## 2025-06-10 NOTE — PROGRESS NOTES
Chief Complaint   Patient presents with    Follow-up     /77   Pulse 65   Resp 16   Ht 1.676 m (5' 6\")   Wt 53 kg (116 lb 12.8 oz)   BMI 18.85 kg/m²   1. Have you been to the ER, urgent care clinic since your last visit?  Hospitalized since your last visit?No    2. Have you seen or consulted any other health care providers outside of the [facility] since your last visit?  Include any pap smears or colon screening. No

## 2025-06-16 ENCOUNTER — CLINICAL DOCUMENTATION (OUTPATIENT)
Age: 45
End: 2025-06-16

## 2025-06-16 DIAGNOSIS — K75.4 AUTOIMMUNE HEPATITIS (HCC): Primary | ICD-10-CM

## 2025-06-16 RX ORDER — TACROLIMUS 1 MG/1
1 CAPSULE ORAL 2 TIMES DAILY
Qty: 60 CAPSULE | Refills: 5 | Status: SHIPPED | OUTPATIENT
Start: 2025-06-16

## 2025-06-16 NOTE — PROGRESS NOTES
Patient sent a my chart message stating she abruptly stopped that azathioprine due to it causing nausea and increased the budesonide. Will continue budesonide at 6 mg daily and start tacrolimus 1 mg twice daily. Repeat blood will be done next week with a TAC level. Directions for blood draw written for patient.

## 2025-06-17 ENCOUNTER — CLINICAL DOCUMENTATION (OUTPATIENT)
Age: 45
End: 2025-06-17

## 2025-06-23 DIAGNOSIS — K75.4 AUTOIMMUNE HEPATITIS (HCC): ICD-10-CM

## 2025-06-26 LAB — TACROLIMUS BLD-MCNC: 1.6 NG/ML (ref 5–20)

## 2025-06-30 ENCOUNTER — RESULTS FOLLOW-UP (OUTPATIENT)
Age: 45
End: 2025-06-30

## 2025-07-08 DIAGNOSIS — K75.4 AUTOIMMUNE HEPATITIS (HCC): ICD-10-CM

## 2025-07-09 DIAGNOSIS — K75.4 AUTOIMMUNE HEPATITIS (HCC): ICD-10-CM

## 2025-07-09 LAB
ALBUMIN SERPL-MCNC: 4.2 G/DL (ref 3.5–5)
ALBUMIN/GLOB SERPL: 1.3 (ref 1.1–2.2)
ALP SERPL-CCNC: 51 U/L (ref 45–117)
ALT SERPL-CCNC: 42 U/L (ref 12–78)
ANION GAP SERPL CALC-SCNC: 5 MMOL/L (ref 2–12)
AST SERPL-CCNC: 19 U/L (ref 15–37)
BILIRUB SERPL-MCNC: 0.4 MG/DL (ref 0.2–1)
BUN SERPL-MCNC: 11 MG/DL (ref 6–20)
BUN/CREAT SERPL: 17 (ref 12–20)
CALCIUM SERPL-MCNC: 9.4 MG/DL (ref 8.5–10.1)
CHLORIDE SERPL-SCNC: 100 MMOL/L (ref 97–108)
CO2 SERPL-SCNC: 27 MMOL/L (ref 21–32)
CREAT SERPL-MCNC: 0.65 MG/DL (ref 0.55–1.02)
GLOBULIN SER CALC-MCNC: 3.3 G/DL (ref 2–4)
GLUCOSE SERPL-MCNC: 87 MG/DL (ref 65–100)
POTASSIUM SERPL-SCNC: 4 MMOL/L (ref 3.5–5.1)
PROT SERPL-MCNC: 7.5 G/DL (ref 6.4–8.2)
SODIUM SERPL-SCNC: 132 MMOL/L (ref 136–145)

## 2025-07-09 RX ORDER — TACROLIMUS 1 MG/1
2 CAPSULE ORAL 2 TIMES DAILY
Qty: 360 CAPSULE | Refills: 3 | Status: SHIPPED | OUTPATIENT
Start: 2025-07-09

## 2025-07-13 DIAGNOSIS — K75.4 AUTOIMMUNE HEPATITIS (HCC): Primary | ICD-10-CM

## 2025-07-23 ENCOUNTER — OFFICE VISIT (OUTPATIENT)
Dept: PRIMARY CARE CLINIC | Facility: CLINIC | Age: 45
End: 2025-07-23
Payer: COMMERCIAL

## 2025-07-23 VITALS
HEART RATE: 72 BPM | SYSTOLIC BLOOD PRESSURE: 106 MMHG | WEIGHT: 114.6 LBS | HEIGHT: 66 IN | BODY MASS INDEX: 18.42 KG/M2 | DIASTOLIC BLOOD PRESSURE: 69 MMHG | OXYGEN SATURATION: 99 % | TEMPERATURE: 98.2 F | RESPIRATION RATE: 16 BRPM

## 2025-07-23 DIAGNOSIS — R10.84 GENERALIZED ABDOMINAL PAIN: ICD-10-CM

## 2025-07-23 DIAGNOSIS — E78.00 HYPERCHOLESTEROLEMIA: Primary | ICD-10-CM

## 2025-07-23 DIAGNOSIS — J33.9 NASAL POLYP: ICD-10-CM

## 2025-07-23 DIAGNOSIS — R11.0 NAUSEA: ICD-10-CM

## 2025-07-23 DIAGNOSIS — F41.9 ANXIETY DISORDER, UNSPECIFIED TYPE: ICD-10-CM

## 2025-07-23 DIAGNOSIS — K75.4 AUTOIMMUNE HEPATITIS (HCC): ICD-10-CM

## 2025-07-23 DIAGNOSIS — R63.4 WEIGHT LOSS: ICD-10-CM

## 2025-07-23 PROCEDURE — 99214 OFFICE O/P EST MOD 30 MIN: CPT | Performed by: INTERNAL MEDICINE

## 2025-07-23 RX ORDER — ALPRAZOLAM 1 MG/1
1 TABLET ORAL 3 TIMES DAILY PRN
COMMUNITY

## 2025-07-23 RX ORDER — FLUTICASONE PROPIONATE 50 MCG
1 SPRAY, SUSPENSION (ML) NASAL DAILY
Qty: 32 G | Refills: 4 | Status: SHIPPED | OUTPATIENT
Start: 2025-07-23

## 2025-07-23 RX ORDER — ACETAMINOPHEN 500 MG
500 TABLET ORAL EVERY 6 HOURS PRN
COMMUNITY

## 2025-07-23 RX ORDER — IBUPROFEN 200 MG
200 TABLET ORAL EVERY 6 HOURS PRN
COMMUNITY

## 2025-07-23 RX ORDER — MULTIVITAMIN WITH IRON
1 TABLET ORAL DAILY
COMMUNITY

## 2025-07-23 SDOH — ECONOMIC STABILITY: FOOD INSECURITY: WITHIN THE PAST 12 MONTHS, THE FOOD YOU BOUGHT JUST DIDN'T LAST AND YOU DIDN'T HAVE MONEY TO GET MORE.: NEVER TRUE

## 2025-07-23 SDOH — ECONOMIC STABILITY: FOOD INSECURITY: WITHIN THE PAST 12 MONTHS, YOU WORRIED THAT YOUR FOOD WOULD RUN OUT BEFORE YOU GOT MONEY TO BUY MORE.: NEVER TRUE

## 2025-07-23 ASSESSMENT — PATIENT HEALTH QUESTIONNAIRE - PHQ9
SUM OF ALL RESPONSES TO PHQ QUESTIONS 1-9: 19
2. FEELING DOWN, DEPRESSED OR HOPELESS: NEARLY EVERY DAY
10. IF YOU CHECKED OFF ANY PROBLEMS, HOW DIFFICULT HAVE THESE PROBLEMS MADE IT FOR YOU TO DO YOUR WORK, TAKE CARE OF THINGS AT HOME, OR GET ALONG WITH OTHER PEOPLE: NOT DIFFICULT AT ALL
7. TROUBLE CONCENTRATING ON THINGS, SUCH AS READING THE NEWSPAPER OR WATCHING TELEVISION: NEARLY EVERY DAY
5. POOR APPETITE OR OVEREATING: NEARLY EVERY DAY
SUM OF ALL RESPONSES TO PHQ QUESTIONS 1-9: 19
9. THOUGHTS THAT YOU WOULD BE BETTER OFF DEAD, OR OF HURTING YOURSELF: NOT AT ALL
SUM OF ALL RESPONSES TO PHQ QUESTIONS 1-9: 19
4. FEELING TIRED OR HAVING LITTLE ENERGY: NEARLY EVERY DAY
SUM OF ALL RESPONSES TO PHQ QUESTIONS 1-9: 19
6. FEELING BAD ABOUT YOURSELF - OR THAT YOU ARE A FAILURE OR HAVE LET YOURSELF OR YOUR FAMILY DOWN: SEVERAL DAYS
8. MOVING OR SPEAKING SO SLOWLY THAT OTHER PEOPLE COULD HAVE NOTICED. OR THE OPPOSITE, BEING SO FIGETY OR RESTLESS THAT YOU HAVE BEEN MOVING AROUND A LOT MORE THAN USUAL: NOT AT ALL
1. LITTLE INTEREST OR PLEASURE IN DOING THINGS: NEARLY EVERY DAY
3. TROUBLE FALLING OR STAYING ASLEEP: NEARLY EVERY DAY

## 2025-07-23 NOTE — PROGRESS NOTES
Jeannie Lopez is a 44 y.o. female and presents with     Chief Complaint   Patient presents with    6 Month Follow-Up       History of Present Illness  The patient presents for weight loss, depression, headaches, autoimmune hepatitis, and nasal polyp.    Weight loss: Attributed to decreased appetite from autoimmune hepatitis medication. Concerned Lipitor may cause stomach issues and weight loss. Discontinued Lipitor previously, leading to increased cholesterol. Stopped budesonide and azathioprine, suspecting they caused illness, primarily azathioprine. Experiences nausea, food aversions, and difficulty keeping food down. Feels full after few bites, no difficulty swallowing. Eating causes pain. Gallbladder has benign condition. Bowel movements every other day. No alcohol except during [place]. Curious if medications cause frequent urination. Mostly indoors, unemployed, job hunting since 12/15/2024, unsuccessful. Completed Cologuard test, normal. On tacrolimus since 06/30/2025, dosage increased to 2 mg twice daily after blood test. No longer taking azathioprine.    Depression: Under psychiatrist care, on medication. Significant losses recently, exacerbating depression and anxiety. Next psychiatrist appointment on 07/28/2025. Depression worsened since last visit. Sleep issues worsened, currently perimenopausal, lab results indicate postmenopausal. Endometrial ablation, no bleeding. Avoids Xanax and tacrolimus together, but increased Xanax use due to sleep issues, not within 2 hours of tacrolimus. Takes ibuprofen sparingly, Tylenol more frequently than Advil. Takes propranolol for panic attacks, increased use recently. Adds Ensure to coffee, takes multivitamin after eating. Takes Puralax if no bowel movement in 3-4 days.    Headaches: Severe, requires dark room and sunglasses. New symptom, unsure if tension or sinus headaches. Ponytail relief, no sinus pain. Wears glasses for reading.    Deviated

## 2025-07-30 ENCOUNTER — HOSPITAL ENCOUNTER (OUTPATIENT)
Facility: HOSPITAL | Age: 45
Discharge: HOME | End: 2025-08-02
Payer: COMMERCIAL

## 2025-07-30 DIAGNOSIS — R10.84 GENERALIZED ABDOMINAL PAIN: ICD-10-CM

## 2025-07-30 PROCEDURE — 76700 US EXAM ABDOM COMPLETE: CPT

## 2025-07-31 ENCOUNTER — CLINICAL DOCUMENTATION (OUTPATIENT)
Age: 45
End: 2025-07-31

## 2025-07-31 DIAGNOSIS — K75.4 AUTOIMMUNE HEPATITIS (HCC): Primary | ICD-10-CM

## 2025-08-11 DIAGNOSIS — K75.4 AUTOIMMUNE HEPATITIS (HCC): ICD-10-CM

## 2025-08-11 LAB
ALBUMIN SERPL-MCNC: 4.3 G/DL (ref 3.5–5.2)
ALBUMIN/GLOB SERPL: 1.3 (ref 1.1–2.2)
ALP SERPL-CCNC: 50 U/L (ref 35–104)
ALT SERPL-CCNC: 37 U/L (ref 10–35)
ANION GAP SERPL CALC-SCNC: 13 MMOL/L (ref 2–14)
AST SERPL-CCNC: 30 U/L (ref 10–35)
BILIRUB SERPL-MCNC: 0.5 MG/DL (ref 0–1.2)
BUN SERPL-MCNC: 8 MG/DL (ref 6–20)
BUN/CREAT SERPL: 11 (ref 12–20)
CALCIUM SERPL-MCNC: 9.6 MG/DL (ref 8.6–10)
CHLORIDE SERPL-SCNC: 100 MMOL/L (ref 98–107)
CO2 SERPL-SCNC: 24 MMOL/L (ref 20–29)
CREAT SERPL-MCNC: 0.66 MG/DL (ref 0.6–1)
GLOBULIN SER CALC-MCNC: 3.2 G/DL (ref 2–4)
GLUCOSE SERPL-MCNC: 77 MG/DL (ref 65–100)
POTASSIUM SERPL-SCNC: 4.1 MMOL/L (ref 3.5–5.1)
PROT SERPL-MCNC: 7.5 G/DL (ref 6.4–8.3)
SODIUM SERPL-SCNC: 137 MMOL/L (ref 136–145)

## 2025-08-13 LAB — TACROLIMUS BLD-MCNC: 3 NG/ML (ref 5–20)
